# Patient Record
Sex: FEMALE | Race: WHITE | Employment: OTHER | URBAN - METROPOLITAN AREA
[De-identification: names, ages, dates, MRNs, and addresses within clinical notes are randomized per-mention and may not be internally consistent; named-entity substitution may affect disease eponyms.]

---

## 2017-01-04 ENCOUNTER — OFFICE VISIT (OUTPATIENT)
Dept: PAIN MANAGEMENT | Age: 72
End: 2017-01-04

## 2017-01-04 VITALS — WEIGHT: 129 LBS | BODY MASS INDEX: 25.19 KG/M2

## 2017-01-04 DIAGNOSIS — M54.50 CHRONIC BILATERAL LOW BACK PAIN WITHOUT SCIATICA: ICD-10-CM

## 2017-01-04 DIAGNOSIS — R53.82 CHRONIC FATIGUE: ICD-10-CM

## 2017-01-04 DIAGNOSIS — M70.62 TROCHANTERIC BURSITIS OF BOTH HIPS: ICD-10-CM

## 2017-01-04 DIAGNOSIS — M46.1 BILATERAL SACROILIITIS (HCC): Primary | ICD-10-CM

## 2017-01-04 DIAGNOSIS — G89.29 CHRONIC BILATERAL LOW BACK PAIN WITHOUT SCIATICA: ICD-10-CM

## 2017-01-04 DIAGNOSIS — M70.61 TROCHANTERIC BURSITIS OF BOTH HIPS: ICD-10-CM

## 2017-01-04 DIAGNOSIS — M54.6 CHRONIC MIDLINE THORACIC BACK PAIN: ICD-10-CM

## 2017-01-04 DIAGNOSIS — Z79.899 ENCOUNTER FOR LONG-TERM (CURRENT) USE OF HIGH-RISK MEDICATION: ICD-10-CM

## 2017-01-04 DIAGNOSIS — G62.89 OTHER POLYNEUROPATHY: ICD-10-CM

## 2017-01-04 DIAGNOSIS — M25.50 PAIN IN JOINT, MULTIPLE SITES: ICD-10-CM

## 2017-01-04 DIAGNOSIS — G89.29 CHRONIC MIDLINE THORACIC BACK PAIN: ICD-10-CM

## 2017-01-04 DIAGNOSIS — M47.817 LUMBOSACRAL SPONDYLOSIS WITHOUT MYELOPATHY: ICD-10-CM

## 2017-01-04 LAB
ALCOHOL UR POC: NORMAL
AMPHETAMINES UR POC: NEGATIVE
BARBITURATES UR POC: NEGATIVE
BENZODIAZEPINES UR POC: NORMAL
BUPRENORPHINE UR POC: NORMAL
CANNABINOIDS UR POC: NEGATIVE
CARISOPRODOL UR POC: NORMAL
COCAINE UR POC: NEGATIVE
FENTANYL UR POC: NORMAL
MDMA/ECSTASY UR POC: NEGATIVE
METHADONE UR POC: NEGATIVE
METHAMPHETAMINE UR POC: NEGATIVE
METHYLPHENIDATE UR POC: NEGATIVE
OPIATES UR POC: NEGATIVE
OXYCODONE UR POC: NORMAL
PHENCYCLIDINE UR POC: NEGATIVE
PROPOXYPHENE UR POC: NORMAL
TRAMADOL UR POC: NORMAL
TRICYCLICS UR POC: NEGATIVE

## 2017-01-04 RX ORDER — OXYCODONE HYDROCHLORIDE 30 MG/1
30 TABLET ORAL
Qty: 90 TAB | Refills: 0 | Status: SHIPPED | OUTPATIENT
Start: 2017-02-02 | End: 2017-01-04 | Stop reason: SDUPTHER

## 2017-01-04 RX ORDER — FENTANYL 50 UG/1
1 PATCH TRANSDERMAL
Qty: 15 PATCH | Refills: 0 | Status: SHIPPED | OUTPATIENT
Start: 2017-03-03 | End: 2017-04-03 | Stop reason: SDUPTHER

## 2017-01-04 RX ORDER — OXYCODONE HYDROCHLORIDE 30 MG/1
30 TABLET ORAL
Qty: 90 TAB | Refills: 0 | Status: SHIPPED | OUTPATIENT
Start: 2017-01-04 | End: 2017-01-04 | Stop reason: SDUPTHER

## 2017-01-04 RX ORDER — LORAZEPAM 1 MG/1
1 TABLET ORAL
Qty: 90 TAB | Refills: 0 | Status: SHIPPED | OUTPATIENT
Start: 2017-01-04 | End: 2017-04-03 | Stop reason: SDUPTHER

## 2017-01-04 RX ORDER — HYDROCHLOROTHIAZIDE 12.5 MG/1
10 TABLET ORAL DAILY
COMMUNITY
End: 2018-08-24

## 2017-01-04 RX ORDER — FENTANYL 50 UG/1
1 PATCH TRANSDERMAL
Qty: 15 PATCH | Refills: 0 | Status: SHIPPED | OUTPATIENT
Start: 2017-01-04 | End: 2017-01-04 | Stop reason: SDUPTHER

## 2017-01-04 RX ORDER — FENTANYL 50 UG/1
1 PATCH TRANSDERMAL
Qty: 15 PATCH | Refills: 0 | Status: SHIPPED | OUTPATIENT
Start: 2017-02-02 | End: 2017-01-04 | Stop reason: SDUPTHER

## 2017-01-04 RX ORDER — OXYCODONE HYDROCHLORIDE 30 MG/1
30 TABLET ORAL
Qty: 90 TAB | Refills: 0 | Status: SHIPPED | OUTPATIENT
Start: 2017-03-03 | End: 2017-04-02

## 2017-01-04 NOTE — MR AVS SNAPSHOT
Visit Information Date & Time Provider Department Dept. Phone Encounter #  
 1/4/2017 10:40 AM Susanna Bui [facility] [address] [facility] [phone] [id number] Follow-up Instructions Return in about 3 months (around 4/4/2017). Upcoming Health Maintenance Date Due Hepatitis C Screening 1945 DTaP/Tdap/Td series (1 - Tdap) 12/2/1967 BREAST CANCER SCRN MAMMOGRAM 12/2/1996 FOBT Q 1 YEAR AGE 50-75 12/2/1996 ZOSTER VACCINE AGE 60> 12/2/2006 GLAUCOMA SCREENING Q2Y 12/2/2011 OSTEOPOROSIS SCREENING (DEXA) 12/2/2011 Pneumococcal 65+ Low/Medium Risk (1 of 2 - PCV13) 12/2/2011 MEDICARE YEARLY EXAM 12/2/2011 INFLUENZA AGE 9 TO ADULT 8/1/2016 Allergies as of 1/4/2017  Review Complete On: 1/4/2017 By: Minerva Mas RN Severity Noted Reaction Type Reactions Codeine    Nausea Only Nsaids (Non-steroidal Anti-inflammatory Drug)    Diarrhea Abdominal pain  
 Sulfa (Sulfonamide Antibiotics)    Hives Current Immunizations  Never Reviewed No immunizations on file. Not reviewed this visit You Were Diagnosed With   
  
 Codes Comments Bilateral sacroiliitis ([facility]    -  Primary ICD-10-CM: M46.1 ICD-9-CM: 720.2 Trochanteric bursitis of both hips     ICD-10-CM: M70.61, M70.62 ICD-9-CM: 726.5 Other polyneuropathy ([facility].)     ICD-10-CM: G62.89 Chronic fatigue     ICD-10-CM: R53.82 
ICD-9-CM: 780.79 Chronic midline thoracic back pain     ICD-10-CM: M54.6, G89.29 ICD-9-CM: 724.1, 338.29 Encounter for long-term (current) use of high-risk medication     ICD-10-CM: Z79.899 ICD-9-CM: V58.69 Pain in joint, multiple sites     ICD-10-CM: M25.50 ICD-9-CM: 719.49 Lumbosacral spondylosis without myelopathy     ICD-10-CM: F40.952 ICD-9-CM: 370. 3 Chronic bilateral low back pain without sciatica     ICD-10-CM: M54.5, G89.29 ICD-9-CM: 724.2, 338.29 Vitals Weight(growth percentile) BMI OB Status Smoking Status 129 lb (58.5 kg) 25.19 kg/m2 Postmenopausal Never Smoker BMI and BSA Data Body Mass Index Body Surface Area  
 25.19 kg/m 2 1.57 m 2 Preferred Pharmacy Pharmacy Name Phone  [address] [phone] Your Updated Medication List  
  
   
This list is accurate as of: 1/4/17 12:04 PM.  Always use your most recent med list.  
  
  
  
  
 aspirin 81 mg tablet Take 81 mg by mouth. CRESTOR 20 mg tablet Generic drug:  rosuvastatin Take 20 mg by mouth daily. fentaNYL 50 mcg/hr PATCH Commonly known as:  DURAGESIC  
1 Patch by TransDERmal route every fourty-eight (48) hours. Max Daily Amount: 1 Patch. for chronic, severe, refractory pain. Mylan, Sandoz, or Mallinckrodt brands only Start taking on:  3/3/2017  
  
 gabapentin 600 mg tablet Commonly known as:  NEURONTIN Take 2 Tabs by mouth three (3) times daily. hydroCHLOROthiazide 12.5 mg tablet Commonly known as:  HYDRODIURIL Take 10 mg by mouth daily. lidocaine 5 % Commonly known as:  [employer] 1 patch by TransDERmal route every twenty-four (24) hours. lisinopril 20 mg tablet Commonly known as:  [place] [employer] Take 20 mg by mouth daily. LORazepam 1 mg tablet Commonly known as:  ATIVAN Take 1 Tab by mouth nightly. 90 day supply. modafinil 100 mg tablet Commonly known as:  PROVIGIL Take 1 Tab by mouth every morning for 90 days. NEXIUM PO Take  by mouth. oxyCODONE IR 30 mg immediate release tablet Commonly known as:  OXY-IR Take 1 Tab by mouth three (3) times daily as needed for Pain for up to 30 days. Max Daily Amount: 90 mg. Indications: NEUROPATHIC PAIN, PAIN Start taking on:  3/3/2017 tiZANidine 4 mg tablet Commonly known as:  Corlis Ax Take 1-2 Tabs by mouth three (3) times daily. As needed for muscle spasms Prescriptions Printed Refills  
 fentaNYL (DURAGESIC) 50 mcg/hr PATCH 0 Starting on: 3/3/2017 Si Patch by TransDERmal route every fourty-eight (48) hours. Max Daily Amount: 1 Patch. for chronic, severe, refractory pain. Mylan, Sandoz, or Mallinckrodt brands only Class: Print Route: TransDERmal  
 oxyCODONE IR (OXY-IR) 30 mg immediate release tablet 0 Starting on: 3/3/2017 Sig: Take 1 Tab by mouth three (3) times daily as needed for Pain for up to 30 days. Max Daily Amount: 90 mg. Indications: NEUROPATHIC PAIN, PAIN Class: Print Route: Oral  
 LORazepam (ATIVAN) 1 mg tablet 0 Sig: Take 1 Tab by mouth nightly. 90 day supply. Class: Print Route: Oral  
  
Follow-up Instructions Return in about 3 months (around 2017). Referral Information Referral ID Referred By Referred To  
  
 [id number] Emy Quintana Not Available Visits Status Start Date End Date 12 New Request 17 If your referral has a status of pending review or denied, additional information will be sent to support the outcome of this decision. Patient Instructions Plan: 
Continue same medications as prescribed for chronic pain Follow up in 3 months or sooner if needed Regular exercise and attention to emotional health and diet remain the most effective ways to treat chronic pain of all kinds You may contact me with questions or concerns through [address] [facility]! Marianna Gardner introduces SocialDial patient portal. Now you can access parts of your medical record, email your doctor's office, and request medication refills online. 1. In your internet browser, go to [email or web address] 2. Click on the First Time User? Click Here link in the Sign In box. You will see the New Member Sign Up page. 3. Enter your SocialDial Access Code exactly as it appears below.  You will not need to use this code after youve completed the sign-up process. If you do not sign up before the expiration date, you must request a new code. · Viadeo Access Code: [email or web address] Expires: 4/4/2017 11:48 AM 
 
4. Enter the last four digits of your Social Security Number (xxxx) and Date of Birth (mm/dd/yyyy) as indicated and click Submit. You will be taken to the next sign-up page. 5. Create a Viadeo ID. This will be your Viadeo login ID and cannot be changed, so think of one that is secure and easy to remember. 6. Create a Viadeo password. You can change your password at any time. 7. Enter your Password Reset Question and Answer. This can be used at a later time if you forget your password. 8. Enter your e-mail address. You will receive e-mail notification when new information is available in [address] 9. Click Sign Up. You can now view and download portions of your medical record. 10. Click the Download Summary menu link to download a portable copy of your medical information. If you have questions, please visit the Frequently Asked Questions section of the Viadeo website. Remember, Viadeo is NOT to be used for urgent needs. For medical emergencies, dial 911. Now available from your iPhone and Android! Please provide this summary of care documentation to your next provider. Your primary care clinician is listed as [facility] [address] If you have any questions after today's visit, please call [phone].

## 2017-01-04 NOTE — PROGRESS NOTES
HISTORY OF PRESENT ILLNESS  Cari Garcia is a 80 y.o. female. Patient presents for follow up of chronic pain due to lumbar DDD and osteoarthritis. She reports that her sacroiliac and lumbar pain has been worse over the past several weeks, particularly on the left. This is worse with rising from a seated position or rolling over in bed. The pain is described as sharp, aching, stabbing, and tender. She denies saddle anesthesia or bowel/bladder dysfunction. She also endorses worse pain at the outer aspect of the hips, which is attributable to chronic trochanteric bursitis. She would like to be referred to Dr. Mateo Mcnulty for SIJ/TB injections, as his office is closer to her home and procedures are performed in-office rather than in the hospital, which reduces her out of pocket costs. Medications continue to work well for pain control overall. Cari Garcia is tolerating medications well, with no untoward side effects noted. She is able to stay more active with less discomfort with these current doses. The patient reports an average of 70% relief with this regimen. Most recent UDS and  were consistent with prescribed medications. Pill counts are appropriate. She is informed of side effects, risks, and benefits of this regimen, and emphasizes that she derives a significant improvement in functionality and quality of life, and notes that non-opioid medications and therapies in the past have not offered significant benefit. She denies new or worsening insomnia or constipation issues. She denies any falls, injuries, or hospitalizations since the last visit. HPI--see above    ROS  Constitutional: Positive for malaise/fatigue. HENT: Positive for neck pain. Gastrointestinal: Positive for constipation. Musculoskeletal: Positive for back pain and joint pain. Psychiatric/Behavioral: The patient does not have insomnia. Physical Exam  Constitutional: She is oriented to person, place, and time.  She appears well-developed and well-nourished. No distress. Musculoskeletal:        Right hip: She exhibits tenderness. Left hip: She exhibits tenderness. Lumbar back: She exhibits decreased range of motion, tenderness, bony tenderness, pain and spasm. Back:              Legs:            Areas of tenderness noted with red arrows  Marked spasms of cervical and lumbar paraspinous musculature noted  Well-healed incision of the anterior right knee with extension to 160 degrees, flexion to 120 degrees  Trochanteric bursae tender to palpation right side     Neurological: She is alert and oriented to person, place, and time. No cranial nerve deficit. She exhibits normal muscle tone. Coordination normal.       Neg straight leg raise   Psychiatric: She has a normal mood and affect. Her behavior is normal. Judgment and thought content normal.   ASSESSMENT and PLAN    ICD-10-CM ICD-9-CM    1. Bilateral sacroiliitis (HCC) M46.1 720.2 REFERRAL TO PAIN MANAGEMENT      DRUG SCREEN      AMB POC DRUG SCREEN ()   2. Trochanteric bursitis of both hips M70.61 726.5 REFERRAL TO PAIN MANAGEMENT    M70.62  DRUG SCREEN      AMB POC DRUG SCREEN ()   3. Other polyneuropathy ([facility] G62.89  DRUG SCREEN      AMB POC DRUG SCREEN ()   4. Chronic fatigue R53.82 780.79 DRUG SCREEN      AMB POC DRUG SCREEN ()   5. Chronic midline thoracic back pain M54.6 724.1 DRUG SCREEN    G89.29 338.29 AMB POC DRUG SCREEN ()   6. Encounter for long-term (current) use of high-risk medication Z79.899 V58.69 DRUG SCREEN      AMB POC DRUG SCREEN ()   7. Pain in joint, multiple sites M25.50 719.49 DRUG SCREEN      AMB POC DRUG SCREEN ()   8. Lumbosacral spondylosis without myelopathy M47.817 721.3 DRUG SCREEN      AMB POC DRUG SCREEN ()   9.  Chronic bilateral low back pain without sciatica M54.5 724.2 DRUG SCREEN    G89.29 338.29 AMB POC DRUG SCREEN ()      Plan:  Continue same medications as prescribed for chronic pain  Follow up in 3 months or sooner if needed  Regular exercise and attention to emotional health and diet remain the most effective ways to treat chronic pain of all kinds  You may contact me with questions or concerns through [address]

## 2017-01-04 NOTE — PROGRESS NOTES
Nursing Notes    Patient presents to the office today in follow-up. Patient rates her pain at 6/10 on the numerical pain scale. Reviewed medications with counts as follows:    Rx Date filled Qty Dispensed Pill Count Last Dose Short   Fentanyl 50 11/25/16 15 1+1 on Placed 12/2/16 no   lary 30 10/14/16 90 65 1/4/16 no         Comments: pt still has script at home for the lary to fill in Nov. She missed appt in Dec. But has made the fentanyl last.  verified fills    POC UDS was performed in office today    Any new labs or imaging since last appointment? NO    Have you been to an emergency room (ER) or urgent care clinic since your last visit? NO            Have you been hospitalized since your last visit? NO     If yes, where, when, and reason for visit? Have you seen or consulted any other health care providers outside of the [address] [place] [address]  since your last visit? NO     If yes, where, when, and reason for visit? Ms. Juan J Tipton has a reminder for a \"due or due soon\" health maintenance. I have asked that she contact her primary care provider for follow-up on this health maintenance.

## 2017-01-04 NOTE — PATIENT INSTRUCTIONS
Plan:  Continue same medications as prescribed for chronic pain  Follow up in 3 months or sooner if needed  Regular exercise and attention to emotional health and diet remain the most effective ways to treat chronic pain of all kinds  You may contact me with questions or concerns through [address]

## 2017-01-10 ENCOUNTER — TELEPHONE (OUTPATIENT)
Dept: PAIN MANAGEMENT | Age: 72
End: 2017-01-10

## 2017-01-10 NOTE — TELEPHONE ENCOUNTER
The pt called the office to check on the status of her referral for Dr. Mateo Mcnulty. A chart review was done and it was noted that it has not been faxed over yet. The referral and other necessary pt information were faxed over [address]. A fax confirmation was received and they will contact the pt. I called and spoke to the pt. She was informed that the referral was faxed. She verbalized understanding and has no other requests at this time.

## 2017-01-11 RX ORDER — DIAZEPAM 5 MG/1
TABLET ORAL
Qty: 2 TAB | Refills: 0 | OUTPATIENT
Start: 2017-01-11 | End: 2017-06-29

## 2017-04-03 ENCOUNTER — OFFICE VISIT (OUTPATIENT)
Dept: PAIN MANAGEMENT | Age: 72
End: 2017-04-03

## 2017-04-03 VITALS
WEIGHT: 129 LBS | HEART RATE: 77 BPM | BODY MASS INDEX: 25.32 KG/M2 | DIASTOLIC BLOOD PRESSURE: 74 MMHG | SYSTOLIC BLOOD PRESSURE: 123 MMHG | HEIGHT: 60 IN

## 2017-04-03 DIAGNOSIS — M79.661 PAIN AND SWELLING OF RIGHT LOWER LEG: ICD-10-CM

## 2017-04-03 DIAGNOSIS — M51.36 DDD (DEGENERATIVE DISC DISEASE), LUMBAR: ICD-10-CM

## 2017-04-03 DIAGNOSIS — M54.50 CHRONIC BILATERAL LOW BACK PAIN WITHOUT SCIATICA: ICD-10-CM

## 2017-04-03 DIAGNOSIS — M79.89 PAIN AND SWELLING OF RIGHT LOWER LEG: ICD-10-CM

## 2017-04-03 DIAGNOSIS — M47.817 LUMBOSACRAL SPONDYLOSIS WITHOUT MYELOPATHY: ICD-10-CM

## 2017-04-03 DIAGNOSIS — Z86.718 HISTORY OF DVT (DEEP VEIN THROMBOSIS): ICD-10-CM

## 2017-04-03 DIAGNOSIS — M53.3 SACROILIAC JOINT PAIN: ICD-10-CM

## 2017-04-03 DIAGNOSIS — M47.816 SPONDYLOSIS OF LUMBAR REGION WITHOUT MYELOPATHY OR RADICULOPATHY: ICD-10-CM

## 2017-04-03 DIAGNOSIS — M17.0 PRIMARY OSTEOARTHRITIS OF BOTH KNEES: ICD-10-CM

## 2017-04-03 DIAGNOSIS — M79.89 PAIN AND SWELLING OF LOWER LEG, LEFT: Primary | ICD-10-CM

## 2017-04-03 DIAGNOSIS — M46.1 SACROILIITIS (HCC): ICD-10-CM

## 2017-04-03 DIAGNOSIS — M25.50 PAIN IN JOINT, MULTIPLE SITES: ICD-10-CM

## 2017-04-03 DIAGNOSIS — G89.29 CHRONIC BILATERAL LOW BACK PAIN WITHOUT SCIATICA: ICD-10-CM

## 2017-04-03 DIAGNOSIS — Z96.659 S/P KNEE REPLACEMENT: ICD-10-CM

## 2017-04-03 DIAGNOSIS — M79.662 PAIN AND SWELLING OF LOWER LEG, LEFT: Primary | ICD-10-CM

## 2017-04-03 DIAGNOSIS — M18.10 DEGENERATIVE ARTHRITIS OF THUMB, UNSPECIFIED LATERALITY: ICD-10-CM

## 2017-04-03 RX ORDER — LORAZEPAM 1 MG/1
1 TABLET ORAL
Qty: 90 TAB | Refills: 0 | Status: SHIPPED | OUTPATIENT
Start: 2017-04-03 | End: 2017-06-29 | Stop reason: SDUPTHER

## 2017-04-03 RX ORDER — FENTANYL 50 UG/1
1 PATCH TRANSDERMAL
Qty: 15 PATCH | Refills: 0 | Status: SHIPPED | OUTPATIENT
Start: 2017-06-10 | End: 2017-06-29 | Stop reason: SDUPTHER

## 2017-04-03 RX ORDER — NALOXONE HYDROCHLORIDE 4 MG/.1ML
4 SPRAY NASAL
Qty: 1 PACKAGE | Refills: 0 | Status: SHIPPED | OUTPATIENT
Start: 2017-04-03

## 2017-04-03 RX ORDER — FENTANYL 50 UG/1
1 PATCH TRANSDERMAL
Qty: 15 PATCH | Refills: 0 | Status: SHIPPED | OUTPATIENT
Start: 2017-05-12 | End: 2017-04-03 | Stop reason: SDUPTHER

## 2017-04-03 RX ORDER — MODAFINIL 100 MG/1
100 TABLET ORAL
Qty: 90 TAB | Refills: 1 | Status: SHIPPED | OUTPATIENT
Start: 2017-04-03 | End: 2017-06-29 | Stop reason: SDUPTHER

## 2017-04-03 RX ORDER — FENTANYL 50 UG/1
1 PATCH TRANSDERMAL
Qty: 15 PATCH | Refills: 0 | Status: SHIPPED | OUTPATIENT
Start: 2017-04-13 | End: 2017-04-03 | Stop reason: SDUPTHER

## 2017-04-03 NOTE — PROGRESS NOTES
HISTORY OF PRESENT ILLNESS  Dolores Christianson is a 80 y.o. female. Patient presents for follow up of chronic pain due to lumbar DDD, chronic sacroiliac dysfunction, and osteoarthritis. She has multiple issues to discuss. She complains of persistent, progressively worsening pain in the left lower lumbar region, radiating into the left buttock and posterior thigh. It dose not extend past the thigh. She denies saddle anesthesia or bowel/bladder dysfunction. Pain worsens considerShe referred to this pain as \"my sciatica\" but we discussed that these symptoms are consistent with sacroiliac inflammation/dysfunction. She was disappointed that her last SIJ injections with Dr. Bernie Childs, noting that they were not effective as they have been in the past. We discussed treatments at length, and she is interested in proceeding with RFA for sacroiliitis. She is willing to stay here for treatment rather than refer out to Dr. Bernie Childs. She does note that lumbar RFA was extremely helpful in 2014 for low back and right leg pain. Procedure discussed in detail with the patient. She also reports bilateral inner thigh pain from the groin to the inner knees that is intermittent and is described as deep and aching and tender to touch. She is worried that she may have another DVT (hx of bilateral DVTs twenty years ago when she was on HRT). We will arrange an updated MRI of the lumbar spine, and encouraged her to see her PCP about groin/inner thigh pain. She requests that we arrange dopplers when she goes in for imaging of the lumbar spine. She also complains of pain and tenderness along the upper-lumbar region on the left side, with spasms. She sees a massage therapist regularly. We discussed the possibility of scheduling trigger point injections. She will consider this. Medications continue to work modestly well for pain control overall. Dolores Christianson is tolerating medications well, with no untoward side effects noted.  She is able to stay more active with less discomfort with these current doses. The patient reports an average of 70% relief with this regimen. Most recent UDS and  were consistent with prescribed medications. Pill counts are appropriate. She is informed of side effects, risks, and benefits of this regimen, and emphasizes that she derives a significant improvement in functionality and quality of life, and notes that non-opioid medications and therapies in the past have not offered significant benefit. We will prescribe Naloxone 4 mg nasal spray to use in case of accidental overdose. The patient is aware not to use Naloxone for any reasons other than opioid toxicity, as its use may precipitate withdrawals. The patient was instructed on directions and indications for use, and has also been advised to inform family members on how to use naloxone if overdose occurs. The patient expresses understanding. She denies new or worsening insomnia or constipation issues. She denies any falls, injuries, or hospitalizations since the last visit. A total of 50 minutes was spent with the patient of which more than 50% of the time was spent counseling the patient. HPI--see above    ROS  Constitutional: Positive for malaise/fatigue. HENT: Positive for neck pain. Gastrointestinal: Positive for constipation. Musculoskeletal: Positive for back pain and joint pain. Psychiatric/Behavioral: The patient does not have insomnia. Physical Exam  Constitutional: She is oriented to person, place, and time. She appears well-developed and well-nourished. No distress. Musculoskeletal:        Right hip: She exhibits tenderness. Left hip: She exhibits tenderness. Lumbar back: She exhibits decreased range of motion, tenderness, bony tenderness, pain and spasm.         Back:              Legs:            Areas of tenderness noted with red arrows  Marked spasms of cervical and lumbar paraspinous musculature noted  Well-healed incision of the anterior right knee with extension to 160 degrees, flexion to 120 degrees  Trochanteric bursae tender to palpation right side  Brisk sacroiliac tenderness noted on left side  Tenderness of inner thighs bilaterally with varicosities evident. No redness or swelling noted. No lymphadenopathy     Neurological: She is alert and oriented to person, place, and time. No cranial nerve deficit. She exhibits normal muscle tone. Coordination normal.       Neg straight leg raise   Psychiatric: She has a normal mood and affect. Her behavior is normal. Judgment and thought content normal.   ASSESSMENT and PLAN    ICD-10-CM ICD-9-CM    1. Pain and swelling of lower leg, left M79.662 729.5 DUPLEX LOWER EXT VENOUS LEFT    M79.89  DUPLEX LOWER EXT VENOUS RIGHT   2. Sacroiliitis (HCC) M46.1 720.2 MRI LUMB SPINE WO CONT   3. Spondylosis of lumbar region without myelopathy or radiculopathy M47.816 721.3 MRI LUMB SPINE WO CONT   4. DDD (degenerative disc disease), lumbar M51.36 722. 52 MRI LUMB SPINE WO CONT   5. Pain and swelling of right lower leg M79.661 729.5 DUPLEX LOWER EXT VENOUS LEFT    M79.89 729.81 DUPLEX LOWER EXT VENOUS RIGHT   6. History of DVT (deep vein thrombosis) Z86.718 V12.51 DUPLEX LOWER EXT VENOUS LEFT      DUPLEX LOWER EXT VENOUS RIGHT   7. Lumbosacral spondylosis without myelopathy M47.817 721.3    8. Chronic bilateral low back pain without sciatica M54.5 724.2     G89.29 338.29    9. Pain in joint, multiple sites M25.50 719.49    10. Myalgia and myositis, unspecified [id number] 729.1    11. Primary osteoarthritis of both knees M17.0 715.16    12. S/P knee replacement Z96.659 V43.65    13. Degenerative arthritis of thumb, unspecified laterality M19.049 715.34    14. Sacroiliac joint pain M53.3 724.6       Plan:  Continue same medications as prescribed for chronic pain  MRI of the lumbar spine  Medial branch of the lumbar spine at L1-S3 and lateral branch block injection L5 (left side).  If effective, then proceed with radiofrequency ablation  Dopplers of the legs to rule out DVT   We will prescribe Naloxone 4 mg nasal spray to use in case of accidental overdose.    Consider weaning off of Ativan and trying a non-narcotic sleep aid to attenuate the risk of falls, fractures and sleep-disordered breathing associated with their use  Follow up in 3 months or sooner if needed  Regular exercise and attention to emotional health and diet remain the most effective ways to treat chronic pain of all kinds  You may contact me with questions or concerns through [employer]

## 2017-04-03 NOTE — PATIENT INSTRUCTIONS
Plan:  Continue same medications as prescribed for chronic pain  MRI of the lumbar spine  Medial branch of the lumbar spine at L1-S3 and lateral branch block injection L5 (left side). If effective, then proceed with radiofrequency ablation  Dopplers of the legs to rule out DVT   We will prescribe Naloxone 4 mg nasal spray to use in case of accidental overdose.    Consider weaning off of Ativan and trying a non-narcotic sleep aid to attenuate the risk of falls, fractures and sleep-disordered breathing associated with their use  Follow up in 3 months or sooner if needed  Regular exercise and attention to emotional health and diet remain the most effective ways to treat chronic pain of all kinds  You may contact me with questions or concerns through [employer]

## 2017-04-03 NOTE — PROGRESS NOTES
Nursing Notes    Patient presents to the office today in follow-up. Patient rates her pain at 4/10 on the numerical pain scale. Reviewed medications with counts as follows:    Rx Date filled Qty Dispensed Pill Count Last Dose Short   Fentanyl 50 mcg/hr  03/15/17 15 10, +1 on  Current patch on right upper back no   Oxycodone 30 mg - pt has 2 unfilled prescriptions. One is  02/10/17 90 74 2 days ago no   Ativan 1 mg 17 90 22 Last night no                    POC UDS was not performed in office today    Any new labs or imaging since last appointment? YES. Pt states that she had some labs done with her pcp    Have you been to an emergency room (ER) or urgent care clinic since your last visit? NO            Have you been hospitalized since your last visit? NO     If yes, where, when, and reason for visit? Have you seen or consulted any other health care providers outside of the [address] [place] [address]  since your last visit? YES     If yes, where, when, and reason for visit? pcp    HM deferred to pcp.

## 2017-04-03 NOTE — MR AVS SNAPSHOT
Visit Information Date & Time Provider Department Dept. Phone Encounter #  
 4/3/2017 11:00 AM Kelby Harvey [address] [place] [address] for Pain Management [phone] [id number] Follow-up Instructions Return in about 3 months (around 7/3/2017). Follow-up and Disposition History Upcoming Health Maintenance Date Due Hepatitis C Screening 1945 DTaP/Tdap/Td series (1 - Tdap) 12/2/1967 BREAST CANCER SCRN MAMMOGRAM 12/2/1996 FOBT Q 1 YEAR AGE 50-75 12/2/1996 ZOSTER VACCINE AGE 60> 12/2/2006 GLAUCOMA SCREENING Q2Y 12/2/2011 OSTEOPOROSIS SCREENING (DEXA) 12/2/2011 Pneumococcal 65+ Low/Medium Risk (1 of 2 - PCV13) 12/2/2011 MEDICARE YEARLY EXAM 12/2/2011 INFLUENZA AGE 9 TO ADULT 8/1/2016 Allergies as of 4/3/2017  Review Complete On: 4/3/2017 By: Jaison Cobian LPN Severity Noted Reaction Type Reactions Codeine    Nausea Only Nsaids (Non-steroidal Anti-inflammatory Drug)    Diarrhea Abdominal pain  
 Sulfa (Sulfonamide Antibiotics)    Hives Current Immunizations  Never Reviewed No immunizations on file. Not reviewed this visit You Were Diagnosed With   
  
 Codes Comments Pain and swelling of lower leg, left    -  Primary ICD-10-CM: M79.662, M79.89 ICD-9-CM: 729.5 [facility]ili[facility] ([facility]     ICD-10-CM: M46.1 ICD-9-CM: 720.2 Spondylosis of lumbar region without myelopathy or radiculopathy     ICD-10-CM: M47.816 ICD-9-CM: 721.3 DDD (degenerative disc disease), lumbar     ICD-10-CM: M51.36 
ICD-9-CM: 722.52 Pain and swelling of right lower leg     ICD-10-CM: M79.661, M79.89 ICD-9-CM: 729.5, 729.81 History of DVT (deep vein thrombosis)     ICD-10-CM: W36.648 ICD-9-CM: V12.51 Lumbosacral spondylosis without myelopathy     ICD-10-CM: B80.887 ICD-9-CM: 797. 3 Chronic bilateral low back pain without sciatica     ICD-10-CM: M54.5, G89.29 ICD-9-CM: 724.2, 338.29   
 Pain in joint, multiple sites     ICD-10-CM: M25.50 ICD-9-CM: 719.49 Myalgia and myositis, unspecified     ICD-10-CM: Merwyn Rice ICD-9-CM: 729.1 Primary osteoarthritis of both knees     ICD-10-CM: M17.0 ICD-9-CM: 715.16 S/P knee replacement     ICD-10-CM: S10.157 ICD-9-CM: V43.65 Degenerative arthritis of thumb, unspecified laterality     ICD-10-CM: M19.049 ICD-9-CM: 715.34 Sacroiliac joint pain     ICD-10-CM: M53.3 ICD-9-CM: 724.6 Vitals BP Pulse Height(growth percentile) Weight(growth percentile) BMI OB Status 123/74 77 5' (1.524 m) 129 lb (58.5 kg) 25.19 kg/m2 Postmenopausal  
 Smoking Status Never Smoker Vitals History BMI and BSA Data Body Mass Index Body Surface Area  
 25.19 kg/m 2 1.57 m 2 Preferred Pharmacy Pharmacy Name Phone [facility] [address], [address] [place] [address] [phone] Your Updated Medication List  
  
   
This list is accurate as of: 4/3/17 12:16 PM.  Always use your most recent med list.  
  
  
  
  
 aspirin 81 mg tablet Take 81 mg by mouth. CRESTOR 20 mg tablet Generic drug:  rosuvastatin Take 20 mg by mouth daily. diazePAM 5 mg tablet Commonly known as:  VALIUM Pt to take one tablet 30 minutes prior to procedure. May take the remaining tablet at facility if needed for anxiety. fentaNYL 50 mcg/hr PATCH Commonly known as:  DURAGESIC  
1 Patch by TransDERmal route every fourty-eight (48) hours. Max Daily Amount: 1 Patch. for chronic, severe, refractory pain. Mylan, Sandoz, or Mallinckrodt brands only Start taking on:  6/10/2017  
  
 gabapentin 600 mg tablet Commonly known as:  NEURONTIN Take 2 Tabs by mouth three (3) times daily. hydroCHLOROthiazide 12.5 mg tablet Commonly known as:  HYDRODIURIL Take 10 mg by mouth daily. lidocaine 5 % Commonly known as:  Calvin Haines  
 1 patch by TransDERmal route every twenty-four (24) hours. lisinopril 20 mg tablet Commonly known as:  Shant Gina Take 20 mg by mouth daily. LORazepam 1 mg tablet Commonly known as:  ATIVAN Take 1 Tab by mouth nightly. 90 day supply. modafinil 100 mg tablet Commonly known as:  PROVIGIL Take 1 Tab by mouth every morning for 90 days. naloxone 4 mg/actuation Spry 4 mg by Nasal route once as needed (for opioid overdose) for up to 1 dose. Indications: OPIOID TOXICITY  
  
 NEXIUM PO Take  by mouth. tiZANidine 4 mg tablet Commonly known as:  Gerarda Christy Take 1-2 Tabs by mouth three (3) times daily. As needed for muscle spasms Prescriptions Printed Refills  
 fentaNYL (DURAGESIC) 50 mcg/hr PATCH 0 Starting on: 6/10/2017 Si Patch by TransDERmal route every fourty-eight (48) hours. Max Daily Amount: 1 Patch. for chronic, severe, refractory pain. Mylan, Sandoz, or Mallinckrodt brands only Class: Print Route: TransDERmal  
 LORazepam (ATIVAN) 1 mg tablet 0 Sig: Take 1 Tab by mouth nightly. 90 day supply. Class: Print Route: Oral  
 modafinil (PROVIGIL) 100 mg tablet 1 Sig: Take 1 Tab by mouth every morning for 90 days. Class: Print Route: Oral  
  
Prescriptions Sent to Pharmacy Refills  
 naloxone 4 mg/actuation spry 0 Si mg by Nasal route once as needed (for opioid overdose) for up to 1 dose. Indications: OPIOID TOXICITY Class: Normal  
 Pharmacy: [facility] [address] [place] [address] #: [phone] Route: Nasal  
  
Follow-up Instructions Return in about 3 months (around 7/3/2017). To-Do List   
 2017 Imaging:  DUPLEX LOWER EXT VENOUS LEFT   
  
 2017 Imaging:  DUPLEX LOWER EXT VENOUS RIGHT   
  
 2017 Imaging:  MRI LUMB SPINE WO CONT Patient Instructions Plan: Continue same medications as prescribed for chronic pain MRI of the lumbar spine Medial branch of the lumbar spine at L1-S3 and lateral branch block injection L5 (left side). If effective, then proceed with radiofrequency ablation Dopplers of the legs to rule out DVT We will prescribe Naloxone 4 mg nasal spray to use in case of accidental overdose. Consider weaning off of Ativan and trying a non-narcotic sleep aid to attenuate the risk of falls, fractures and sleep-disordered breathing associated with their use Follow up in 3 months or sooner if needed Regular exercise and attention to emotional health and diet remain the most effective ways to treat chronic pain of all kinds You may contact me with questions or concerns through [address] Introducing [facility]! Eugenia Núñez introduces [employer] patient portal. Now you can access parts of your medical record, email your doctor's office, and request medication refills online. 1. In your internet browser, go to [email or web address] 2. Click on the First Time User? Click Here link in the Sign In box. You will see the New Member Sign Up page. 3. Enter your [employer] Access Code exactly as it appears below. You will not need to use this code after youve completed the sign-up process. If you do not sign up before the expiration date, you must request a new code. · [employer] Access Code: [email or web address] Expires: 4/4/2017 12:48 PM 
 
4. Enter the last four digits of your Social Security Number (xxxx) and Date of Birth (mm/dd/yyyy) as indicated and click Submit. You will be taken to the next sign-up page. 5. Create a InVivio[employer] ID. This will be your InVivio[employer] login ID and cannot be changed, so think of one that is secure and easy to remember. 6. Create a InV[employer] password. You can change your password at any time. 7. Enter your Password Reset Question and Answer. This can be used at a later time if you forget your password. 8. Enter your e-mail address. You will receive e-mail notification when new information is available in [address] 9. Click Sign Up. You can now view and download portions of your medical record. 10. Click the Download Summary menu link to download a portable copy of your medical information. If you have questions, please visit the Frequently Asked Questions section of the [employer]olidar website. Remember, [employer]ar is NOT to be used for urgent needs. For medical emergencies, dial 911. Now available from your iPhone and Android! Please provide this summary of care documentation to your next provider. Your primary care clinician is [facility] [address] If you have any questions after today's visit, please call [phone].

## 2017-04-05 ENCOUNTER — APPOINTMENT (OUTPATIENT)
Dept: GENERAL RADIOLOGY | Age: 72
End: 2017-04-05
Attending: PHYSICAL MEDICINE & REHABILITATION
Payer: MEDICARE

## 2017-04-05 ENCOUNTER — HOSPITAL ENCOUNTER (OUTPATIENT)
Age: 72
Setting detail: OUTPATIENT SURGERY
Discharge: HOME | End: 2017-04-05
Attending: PHYSICAL MEDICINE & REHABILITATION | Admitting: PHYSICAL MEDICINE & REHABILITATION
Payer: MEDICARE

## 2017-04-05 VITALS
HEART RATE: 60 BPM | HEIGHT: 60 IN | TEMPERATURE: 98.3 F | RESPIRATION RATE: 16 BRPM | SYSTOLIC BLOOD PRESSURE: 153 MMHG | WEIGHT: 129 LBS | DIASTOLIC BLOOD PRESSURE: 98 MMHG | OXYGEN SATURATION: 100 % | BODY MASS INDEX: 25.32 KG/M2

## 2017-04-05 PROCEDURE — 77030003665 HC NDL SPN BBMI -A: Performed by: PHYSICAL MEDICINE & REHABILITATION

## 2017-04-05 PROCEDURE — 74011250637 HC RX REV CODE- 250/637: Performed by: PHYSICAL MEDICINE & REHABILITATION

## 2017-04-05 PROCEDURE — 77030003672 HC NDL SPN HALY -A: Performed by: PHYSICAL MEDICINE & REHABILITATION

## 2017-04-05 PROCEDURE — 74011250636 HC RX REV CODE- 250/636

## 2017-04-05 PROCEDURE — 76010000009 HC PAIN MGT 0 TO 30 MIN PROC: Performed by: PHYSICAL MEDICINE & REHABILITATION

## 2017-04-05 PROCEDURE — 74011000250 HC RX REV CODE- 250

## 2017-04-05 RX ORDER — ROPIVACAINE HYDROCHLORIDE 2 MG/ML
INJECTION, SOLUTION EPIDURAL; INFILTRATION; PERINEURAL AS NEEDED
Status: DISCONTINUED | OUTPATIENT
Start: 2017-04-05 | End: 2017-04-05 | Stop reason: HOSPADM

## 2017-04-05 RX ORDER — DIAZEPAM 5 MG/1
5-20 TABLET ORAL ONCE
Status: COMPLETED | OUTPATIENT
Start: 2017-04-05 | End: 2017-04-05

## 2017-04-05 RX ORDER — SODIUM CHLORIDE 0.9 % (FLUSH) 0.9 %
5-10 SYRINGE (ML) INJECTION AS NEEDED
Status: DISCONTINUED | OUTPATIENT
Start: 2017-04-05 | End: 2017-04-05 | Stop reason: HOSPADM

## 2017-04-05 RX ADMIN — DIAZEPAM 5 MG: 5 TABLET ORAL at 08:15

## 2017-04-05 NOTE — PROCEDURES
[facility] [address] FOR PAIN MANAGEMENT    LUMBAR DORSAL RAMUS AND LATERAL BRANCH BLOCKS   PROCEDURE REPORT      PATIENT:  Vida Osullivan  YOB: 1945  DATE OF SERVICE:  4/5/2017  [facility]    PRE-PROCEDURE DIAGNOSIS:  See Above    POST-PROCEDURE DIAGNOSIS:  See Above                PROCEDURE:  1. Bilateral diagnostic lumbar facet injection (via the L5 medial branch nerve block) (77626, 50  )  2. Bilateral S1, S2 and S3 dorsal rami lateral branch nerve blocks (64450 x3)  3. Fluoroscopic needle guidance, spinal (for the lateral branch blocks) (49731)      ANESTHESIA:  Local with oral sedation. See Medication Administration Record for specific medications and dosage. COMPLICATIONS: None. PHYSICIAN:  Liudmila Grande MD    PRE-PROCEDURE NOTE:  Pre-procedural assessment of the patient was performed including a limited history and physical examination. The details of the procedure were discussed with the patient, including the risks, benefits and alternative options and an informed consent was obtained. The patients NPO status, if necessary for the specific procedure and/or administration of moderate intravenous sedation, if utilized, and availability of a responsible adult to escort the patient following the procedure were confirmed. PROCEDURE NOTE:  The patient was brought to the procedure suite and positioned on the fluoroscopy table in the prone position. Physiologic monitors were applied and supplemental oxygen was administered via nasal cannula. The skin was prepped in the standard surgical fashion and sterile drapes were applied over the procedure site. Please refer to the Flowsheet for documentation of the patients vital signs and the Medication Administration Report for any oral sedation administered prior to or during the procedure. 1% Lidocaine was utilized for local anesthesia.  Under AP fluoroscopic guidance a 22-gauge, 3-1/2 inch short bevel spinal needle was advanced to the superior margin of the sacral ala to block the L5 medial branch nerve(s). Following this, a 22-gauge, 3-1/2 inch short bevel spinal needle was advanced to the lateral aspect of the S1 neural foramen and repeated in the same fashion at the S2 and S3 neural foramina. After all needles were placed, 1 mL of 0.2% ropivacaine was injected at each location after the negative aspiration of blood, air or CSF. The needles were removed and the stilets were replaced. The procedure was performed on the contralateral side in the same fashion and at the same levels using the same volume of local anesthetic following negative aspiration of blood, air or CSF. The needles were removed intact. The area was thoroughly cleaned and sterile bandages applied as necessary. The patient tolerated the procedure well and vital signs remained stable throughout the procedure. The patient was assessed immediately following the procedure and was noted to have greater than 50% reduction in pain (a reduction from 7/10 to 3/10 in severity of lumbar pain). Based on these results, this procedure will be repeated to assess if the patient is an appropriate candidate for radiofrequency ablation of the above-mentioned medial branch nerves. POST-PROCEDURE COURSE:   The patient was escorted from the procedure suite in satisfactory condition and recovered per facility protocol based on the type of procedure performed and/or the sedation utilized. The patient did not experience any adverse events and remained hemodynamically stable during the post-procedure period. DISCHARGE NOTE:  Upon discharge, the patient was able to tolerate fluids and was in no acute distress. The patient was oriented to person, place and time and vital signs were stable.  Appropriate post-procedure instructions were provided and explained to the patient in detail and all questions were answered.     Petr White MD 4/5/2017 9:28 AM

## 2017-04-05 NOTE — INTERVAL H&P NOTE
H&P Update:  Gallito Ramos was seen and examined. History and physical has been reviewed. The patient has been examined.  There have been no significant clinical changes since the completion of the originally dated History and Physical.    Signed By: Maryrose Baumgarten, MD     April 5, 2017 8:14 AM

## 2017-04-05 NOTE — DISCHARGE INSTRUCTIONS
[address] [place] [address] for Pain Management      Post Procedures Instructions    *Resume Diet and Activity as tolerated. Rest for the remainder of the day. *You may fell worse before you feel better as the numbing medications wear off before the steroids take effect if used for your procedures. *Do not use affected extremity until numbness or loss of sensation has completely resolved without assistance. *DO NOT DRIVE, operate machinery/heavey equipment for 24 hours. *DO NOT DRINK ALCOHOL for 24 hours as it may interact with the sedation if you received it and also thins your blood and may cause you to bleed. *WAIT 24 hours before starting back ANY Blood thinning medications:   (Heparin, Coumadin, Warfarin, Lovenox, Plavix, Aggrenox)    *Resume Pre-Procedure Medications as prescribed except Blood Thinners unless directed by your Physician or Cardiologist.     *Avoid Hot tubs and Heating pad for 24 hours to prevent dissipation of medications, you may shower to remove bandages and remaining prep residue on the skin. * If you develop a Headache, drink plenty of fluids including beverages with caffeine (Coffee, Mt. Dew etc.) and rest.  If the headache persists longer than 24 hoursor intensifies - Please call [facility] ([facility]) [phone]      * If you are DIABETIC, check your blood sugar three times a day for the next three days, the steroids will increase your blood sugar. If your blood sugar is greater than 400 have someone drive you to the nearest [address]. * If you experience any of the following problems, call the Center for Pain Management [phone] between 8:00 am - 4:30pm or After Hours [phone].     Shortness of breath    Fever of 101 F or higher    Nausea / Vomiting (not normal to you)    Increasing stiffness in the neck    Weakness or numbness in the arms or legs that is not resolving    Prolonged and increasing pain > than 4 days    ANYTHING OUT of the ORDINARY TO YOU    If YOU are experiencing a severe reaction / complication that you have never had before post procedure, call 911 or go to the nearest emergency room! All patients must have a  for transportation [place] regardless if you do or do not receive sedation. DISCHARGE SUMMARY from Nurse      PATIENT INSTRUCTIONS:    After Oral  or intravenous sedation, for 24 hours or while taking prescription Narcotics:  · Limit your activities  · Do not drive and operate hazardous machinery  · Do not make important personal or business decisions  · Do  not drink alcoholic beverages  · If you have not urinated within 8 hours after discharge, please contact your surgeon on call. Report the following to your surgeon:  · Excessive pain, swelling, redness or odor of or around the surgical area  · Temperature over 101  · Nausea and vomiting lasting longer than 4 hours or if unable to take medications  · Any signs of decreased circulation or nerve impairment to extremity: change in color, persistent  numbness, tingling, coldness or increase pain  · Any questions        What to do at Home:  Recommended activity: Activity as tolerated, NO DRIVING FOR 24 Hours post injection          *  Please give a list of your current medications to your Primary Care Provider. *  Please update this list whenever your medications are discontinued, doses are      changed, or new medications (including over-the-counter products) are added. *  Please carry medication information at all times in case of emergency situations. These are general instructions for a healthy lifestyle:    No smoking/ No tobacco products/ Avoid exposure to second hand smoke    Surgeon General's Warning:  Quitting smoking now greatly reduces serious risk to your health.     Obesity, smoking, and sedentary lifestyle greatly increases your risk for illness    A healthy diet, regular physical exercise & weight monitoring are important for maintaining a healthy lifestyle    You may be retaining fluid if you have a history of heart failure or if you experience any of the following symptoms:  Weight gain of 3 pounds or more overnight or 5 pounds in a week, increased swelling in our hands or feet or shortness of breath while lying flat in bed. Please call your doctor as soon as you notice any of these symptoms; do not wait until your next office visit. Recognize signs and symptoms of STROKE:    F-face looks uneven    A-arms unable to move or move unevenly    S-speech slurred or non-existent    T-time-call 911 as soon as signs and symptoms begin-DO NOT go       Back to bed or wait to see if you get better-TIME IS BRAIN. [employer] Activation    Thank you for requesting access to [employer]. Please follow the instructions below to securely access and download your online medical record. [employer] allows you to send messages to your doctor, view your test results, renew your prescriptions, schedule appointments, and more. How Do I Sign Up? 1. In your internet browser, go to [email or web address]  2. Click on the First Time User? Click Here link in the Sign In box. You will be redirect to the New Member Sign Up page. 3. Enter your [employer] Access Code exactly as it appears below. You will not need to use this code after youve completed the sign-up process. If you do not sign up before the expiration date, you must request a new code. [employer] Access Code: [email or web address]  Expires: 2017  7:11 AM (This is the date your [employer] access code will )    4. Enter the last four digits of your Social Security Number (xxxx) and Date of Birth (mm/dd/yyyy) as indicated and click Submit. You will be taken to the next sign-up page. 5. Create a [employer] ID. This will be your [employer] login ID and cannot be changed, so think of one that is secure and easy to remember. 6. Create a [employer] password.  You can change your password at any time. 7. Enter your Password Reset Question and Answer. This can be used at a later time if you forget your password. 8. Enter your e-mail address. You will receive e-mail notification when new information is available in [address] 9. Click Sign Up. You can now view and download portions of your medical record. 10. Click the Download Summary menu link to download a portable copy of your medical information. Additional Information    If you have questions, please visit the Frequently Asked Questions section of the [employer] website at [email or web address] Remember, [employer] is NOT to be used for urgent needs. For medical emergencies, dial 911.

## 2017-04-05 NOTE — H&P (VIEW-ONLY)
Nursing Notes    Patient presents to the office today in follow-up. Patient rates her pain at 4/10 on the numerical pain scale. Reviewed medications with counts as follows:    Rx Date filled Qty Dispensed Pill Count Last Dose Short   Fentanyl 50 mcg/hr  03/15/17 15 10, +1 on  Current patch on right upper back no   Oxycodone 30 mg - pt has 2 unfilled prescriptions. One is  02/10/17 90 74 2 days ago no   Ativan 1 mg 17 90 22 Last night no                    POC UDS was not performed in office today    Any new labs or imaging since last appointment? YES. Pt states that she had some labs done with her pcp    Have you been to an emergency room (ER) or urgent care clinic since your last visit? NO            Have you been hospitalized since your last visit? NO     If yes, where, when, and reason for visit? Have you seen or consulted any other health care providers outside of the [address] [place] [address]  since your last visit? YES     If yes, where, when, and reason for visit? pcp    HM deferred to pcp.

## 2017-04-06 RX ORDER — TIZANIDINE 4 MG/1
4-8 TABLET ORAL 3 TIMES DAILY
Qty: 360 TAB | Refills: 2 | Status: SHIPPED | OUTPATIENT
Start: 2017-04-06 | End: 2018-08-24 | Stop reason: SDUPTHER

## 2017-04-07 ENCOUNTER — DOCUMENTATION ONLY (OUTPATIENT)
Dept: PAIN MANAGEMENT | Age: 72
End: 2017-04-07

## 2017-04-07 NOTE — PROGRESS NOTES
The pt's orders for an MRI and duplex studies were faxed over to the central scheduling dept at [facility]. A fax confirmation was received and they will contact the pt to schedule an appt.

## 2017-04-18 ENCOUNTER — TELEPHONE (OUTPATIENT)
Dept: PAIN MANAGEMENT | Age: 72
End: 2017-04-18

## 2017-04-18 RX ORDER — DIAZEPAM 5 MG/1
5-20 TABLET ORAL ONCE
Status: CANCELLED | OUTPATIENT
Start: 2017-04-19 | End: 2017-04-19

## 2017-04-18 NOTE — TELEPHONE ENCOUNTER
Ms. Jerry Mckeon Left a Voice message on 04/06/2017  for follow-up status post Bilateral diagnostic lumbar facet injection (via the L5 medial branch nerve block) and Bilateral S1, S2 and S3 dorsal rami lateral branch nerve blocks  on 04/05/2017. She reports:    Pre-procedure numerical pain score: 6/10  Post-procedure numerical pain score immediately after: 3/10  Duration of relief post-procedure (if applicable):  Around 6 hours  Improvement in functional activities (if applicable): Yes  Percentage of overall improvement: 50%    COMMENTS: Patient reported that she rested and moving very well.

## 2017-04-19 ENCOUNTER — APPOINTMENT (OUTPATIENT)
Dept: GENERAL RADIOLOGY | Age: 72
End: 2017-04-19
Attending: PHYSICAL MEDICINE & REHABILITATION
Payer: MEDICARE

## 2017-04-19 ENCOUNTER — HOSPITAL ENCOUNTER (OUTPATIENT)
Age: 72
Setting detail: OUTPATIENT SURGERY
Discharge: HOME | End: 2017-04-19
Attending: PHYSICAL MEDICINE & REHABILITATION | Admitting: PHYSICAL MEDICINE & REHABILITATION
Payer: MEDICARE

## 2017-04-19 VITALS
DIASTOLIC BLOOD PRESSURE: 89 MMHG | RESPIRATION RATE: 18 BRPM | HEART RATE: 71 BPM | WEIGHT: 129 LBS | OXYGEN SATURATION: 96 % | SYSTOLIC BLOOD PRESSURE: 142 MMHG | TEMPERATURE: 98.5 F | HEIGHT: 60 IN | BODY MASS INDEX: 25.32 KG/M2

## 2017-04-19 PROCEDURE — 74011250637 HC RX REV CODE- 250/637: Performed by: PHYSICAL MEDICINE & REHABILITATION

## 2017-04-19 PROCEDURE — 76010000009 HC PAIN MGT 0 TO 30 MIN PROC: Performed by: PHYSICAL MEDICINE & REHABILITATION

## 2017-04-19 PROCEDURE — 74011250636 HC RX REV CODE- 250/636

## 2017-04-19 PROCEDURE — 77030003672 HC NDL SPN HALY -A: Performed by: PHYSICAL MEDICINE & REHABILITATION

## 2017-04-19 PROCEDURE — 77030003665 HC NDL SPN BBMI -A: Performed by: PHYSICAL MEDICINE & REHABILITATION

## 2017-04-19 PROCEDURE — 74011000250 HC RX REV CODE- 250

## 2017-04-19 RX ORDER — DIAZEPAM 5 MG/1
5-20 TABLET ORAL ONCE
Status: COMPLETED | OUTPATIENT
Start: 2017-04-19 | End: 2017-04-19

## 2017-04-19 RX ORDER — ROPIVACAINE HYDROCHLORIDE 2 MG/ML
INJECTION, SOLUTION EPIDURAL; INFILTRATION; PERINEURAL AS NEEDED
Status: DISCONTINUED | OUTPATIENT
Start: 2017-04-19 | End: 2017-04-19 | Stop reason: HOSPADM

## 2017-04-19 RX ORDER — DIAZEPAM 5 MG/1
5-20 TABLET ORAL ONCE
Status: DISCONTINUED | OUTPATIENT
Start: 2017-04-19 | End: 2017-04-19

## 2017-04-19 RX ORDER — LIDOCAINE HYDROCHLORIDE 10 MG/ML
INJECTION, SOLUTION EPIDURAL; INFILTRATION; INTRACAUDAL; PERINEURAL AS NEEDED
Status: DISCONTINUED | OUTPATIENT
Start: 2017-04-19 | End: 2017-04-19 | Stop reason: HOSPADM

## 2017-04-19 RX ADMIN — DIAZEPAM 5 MG: 5 TABLET ORAL at 09:42

## 2017-04-19 NOTE — PERIOP NOTES
Patient tolerated procedure well. No complications noted. VSS. No redness, swelling, or bleeding from injection site. Dressing dry and intact. Armband removed and shredded. Patient ambulated to main entrance alive and well, in stable condition.

## 2017-04-19 NOTE — INTERVAL H&P NOTE
H&P Update:  Jake Christiansen was seen and examined. History and physical has been reviewed. The patient has been examined.  There have been no significant clinical changes since the completion of the originally dated History and Physical.    Signed By: Elgin Mustafa MD     April 19, 2017 9:24 AM

## 2017-04-19 NOTE — DISCHARGE INSTRUCTIONS
[address] [place] [address] for Pain Management      Post Procedures Instructions    *Resume Diet and Activity as tolerated. Rest for the remainder of the day. *You may fell worse before you feel better as the numbing medications wear off before the steroids take effect if used for your procedures. *Do not use affected extremity until numbness or loss of sensation has completely resolved without assistance. *DO NOT DRIVE, operate machinery/heavey equipment for 24 hours. *DO NOT DRINK ALCOHOL for 24 hours as it may interact with the sedation if you received it and also thins your blood and may cause you to bleed. *WAIT 24 hours before starting back ANY Blood thinning medications:   (Heparin, Coumadin, Warfarin, Lovenox, Plavix, Aggrenox)    *Resume Pre-Procedure Medications as prescribed except Blood Thinners unless directed by your Physician or Cardiologist.     *Avoid Hot tubs and Heating pad for 24 hours to prevent dissipation of medications, you may shower to remove bandages and remaining prep residue on the skin. * If you develop a Headache, drink plenty of fluids including beverages with caffeine (Coffee, Mt. Dew etc.) and rest.  If the headache persists longer than 24 hoursor intensifies - Please call [facility] ([facility]) [phone]      * If you are DIABETIC, check your blood sugar three times a day for the next three days, the steroids will increase your blood sugar. If your blood sugar is greater than 400 have someone drive you to the nearest [address] [place] [address] [place]. * If you experience any of the following problems, call the [facility] [phone] between 8:00 am - 4:30pm or After Hours [phone].     Shortness of breath    Fever of 101 F or higher    Nausea / Vomiting (not normal to you)    Increasing stiffness in the neck    Weakness or numbness in the arms or legs that is not resolving    Prolonged and increasing pain > than 4 days    ANYTHING OUT of the ORDINARY TO YOU    If YOU are experiencing a severe reaction / complication that you have never had before post procedure, call 911 or go to the nearest emergency room! All patients must have a  for transportation [place] regardless if you do or do not receive sedation. DISCHARGE SUMMARY from Nurse      PATIENT INSTRUCTIONS:    After Oral  or intravenous sedation, for 24 hours or while taking prescription Narcotics:  · Limit your activities  · Do not drive and operate hazardous machinery  · Do not make important personal or business decisions  · Do  not drink alcoholic beverages  · If you have not urinated within 8 hours after discharge, please contact your surgeon on call. Report the following to your surgeon:  · Excessive pain, swelling, redness or odor of or around the surgical area  · Temperature over 101  · Nausea and vomiting lasting longer than 4 hours or if unable to take medications  · Any signs of decreased circulation or nerve impairment to extremity: change in color, persistent  numbness, tingling, coldness or increase pain  · Any questions        What to do at Home:  Recommended activity: Activity as tolerated, NO DRIVING FOR 24 Hours post injection          *  Please give a list of your current medications to your Primary Care Provider. *  Please update this list whenever your medications are discontinued, doses are      changed, or new medications (including over-the-counter products) are added. *  Please carry medication information at all times in case of emergency situations. These are general instructions for a healthy lifestyle:    No smoking/ No tobacco products/ Avoid exposure to second hand smoke    Surgeon General's Warning:  Quitting smoking now greatly reduces serious risk to your health.     Obesity, smoking, and sedentary lifestyle greatly increases your risk for illness    A healthy diet, regular physical exercise & weight monitoring are important for maintaining a healthy lifestyle    You may be retaining fluid if you have a history of heart failure or if you experience any of the following symptoms:  Weight gain of 3 pounds or more overnight or 5 pounds in a week, increased swelling in our hands or feet or shortness of breath while lying flat in bed. Please call your doctor as soon as you notice any of these symptoms; do not wait until your next office visit. Recognize signs and symptoms of STROKE:    F-face looks uneven    A-arms unable to move or move unevenly    S-speech slurred or non-existent    T-time-call 911 as soon as signs and symptoms begin-DO NOT go       Back to bed or wait to see if you get better-TIME IS BRAIN. [employer] Activation    Thank you for requesting access to [employer]. Please follow the instructions below to securely access and download your online medical record. [employer] allows you to send messages to your doctor, view your test results, renew your prescriptions, schedule appointments, and more. How Do I Sign Up? 1. In your internet browser, go to [email or web address]  2. Click on the First Time User? Click Here link in the Sign In box. You will be redirect to the New Member Sign Up page. 3. Enter your [employer] Access Code exactly as it appears below. You will not need to use this code after youve completed the sign-up process. If you do not sign up before the expiration date, you must request a new code. [employer] Access Code: [email or web address]  Expires: 2017  7:11 AM (This is the date your [employer] access code will )    4. Enter the last four digits of your Social Security Number (xxxx) and Date of Birth (mm/dd/yyyy) as indicated and click Submit. You will be taken to the next sign-up page. 5. Create a [employer] ID. This will be your [employer] login ID and cannot be changed, so think of one that is secure and easy to remember. 6. Create a [employer] password.  You can change your password at any time. 7. Enter your Password Reset Question and Answer. This can be used at a later time if you forget your password. 8. Enter your e-mail address. You will receive e-mail notification when new information is available in [address] 9. Click Sign Up. You can now view and download portions of your medical record. 10. Click the Download Summary menu link to download a portable copy of your medical information. Additional Information    If you have questions, please visit the Frequently Asked Questions section of the [employer] website at [email or web address] Remember, [employer] is NOT to be used for urgent needs. For medical emergencies, dial 911.

## 2017-04-19 NOTE — H&P (VIEW-ONLY)
The pt's orders for an MRI and duplex studies were faxed over to the central scheduling dept at [facility]. A fax confirmation was received and they will contact the pt to schedule an appt.

## 2017-04-20 ENCOUNTER — TELEPHONE (OUTPATIENT)
Dept: PAIN MANAGEMENT | Age: 72
End: 2017-04-20

## 2017-04-20 NOTE — TELEPHONE ENCOUNTER
Ms. Mariia Weiss was contacted for follow-up status post Bilateral diagnostic lumbar facet injection (via the L5 medial branch nerve block)Bilateral S1, S2 and S3 dorsal rami lateral branch nerve blocks   on 04/05/2017. She reports:    Pre-procedure numerical pain score: 6/10  Post-procedure numerical pain score immediately after: 2/10  Duration of relief post-procedure (if applicable): 5 hours  Improvement in functional activities (if applicable):  Yes  Percentage of overall improvement: 60%    COMMENTS: She was walking around her yard pulling out weed.

## 2017-04-24 NOTE — PROCEDURES
[facility]    LUMBAR DORSAL RAMUS AND LATERAL BRANCH BLOCKS             PROCEDURE REPORT      PATIENT:  Larkin Dakins  YOB: 1945  DATE OF SERVICE:  4/19/2017  [facility]    PRE-PROCEDURE DIAGNOSIS:  See Above    POST-PROCEDURE DIAGNOSIS:  See Above                PROCEDURE:  1. Bilateral diagnostic lumbar facet injection (via the L5 medial branch nerve block) (66033, 50 )        2. Bilateral S1, S2 and S3 dorsal rami lateral branch nerve              blocks (64450 x3)        3. Fluoroscopic needle guidance, spinal (for the lateral branch blocks) (21771)            ANESTHESIA:  Local with oral sedation. See Medication Administration Record for specific medications and dosage. COMPLICATIONS: None. PHYSICIAN:  Zenaida Oconnell MD    PRE-PROCEDURE NOTE:  Pre-procedural assessment of the patient was performed including a limited history and physical examination. The details of the procedure were discussed with the patient, including the risks, benefits and alternative options and an informed consent was obtained. The patients NPO status, if necessary for the specific procedure and/or administration of moderate intravenous sedation, if utilized, and availability of a responsible adult to escort the patient following the procedure were confirmed. PROCEDURE NOTE:  The patient was brought to the procedure suite and positioned on the fluoroscopy table in the prone position. Physiologic monitors were applied and supplemental oxygen was administered via nasal cannula. The skin was prepped in the standard surgical fashion and sterile drapes were applied over the procedure site. Please refer to the Flowsheet for documentation of the patients vital signs and the Medication Administration Report for any oral sedation administered prior to or during the procedure.        1% Lidocaine was utilized for local anesthesia. Under AP fluoroscopic guidance a 22-gauge, 3-1/2 inch short bevel spinal needle was advanced to the superior margin of the sacral ala to block the respective L5 medial branch nerve(s). Following this, a 22-gauge, 3-1/2 inch short bevel spinal needle was advanced to the lateral aspect of the S1 neural foramen and repeated in the same fashion at the S2 and S3 neural foramina. After all needles were placed, 1 mL of 0.2% ropivacaine was injected at each location after the negative aspiration of blood, air or CSF. The needles were removed and the stilets were replaced. The procedure was performed on the contralateral side in the same fashion and at the same levels using the same volume of local anesthetic following negative aspiration of blood, air or CSF. The needles were removed intact. The area was thoroughly cleaned and sterile bandages applied as necessary. The patient tolerated the procedure well and vital signs remained stable throughout the procedure. The patient was assessed immediately following the procedure and was noted to have greater than 50% reduction in pain (a reduction from 8/10 to 3/10 in severity of lumbar pain). Based on these results, the patient was considered an appropriate candidate for radiofrequency ablation of the above-mentioned medial and lateral branch nerves and will be scheduled for that procedure. POST-PROCEDURE COURSE:   The patient was escorted from the procedure suite in satisfactory condition and recovered per facility protocol based on the type of procedure performed and/or the sedation utilized. The patient did not experience any adverse events and remained hemodynamically stable during the post-procedure period. DISCHARGE NOTE:  Upon discharge, the patient was able to tolerate fluids and was in no acute distress. The patient was oriented to person, place and time and vital signs were stable.  Appropriate post-procedure instructions were provided and explained to the patient in detail and all questions were answered.     Shama Crowder MD 4/24/2017 7:15 PM

## 2017-04-28 ENCOUNTER — TELEPHONE (OUTPATIENT)
Dept: PAIN MANAGEMENT | Age: 72
End: 2017-04-28

## 2017-04-28 NOTE — TELEPHONE ENCOUNTER
The pt was called and given the results of her most recent tests per the provider's review. She verbalized understanding and has no questions at this time. She has already been scheduled for one of the suggested injections by the provider. Pt will talk more with the provider about her results when she comes back to see her. Pt asked to speak to the procedure . She was made aware that she is not in today but that a message can be given to her to call the pt. The pt was ok with this. Message placed on 's desk.

## 2017-05-01 RX ORDER — MIDAZOLAM HYDROCHLORIDE 1 MG/ML
.5-6 INJECTION, SOLUTION INTRAMUSCULAR; INTRAVENOUS
Status: CANCELLED | OUTPATIENT
Start: 2017-05-01

## 2017-05-01 RX ORDER — SODIUM CHLORIDE 0.9 % (FLUSH) 0.9 %
5-10 SYRINGE (ML) INJECTION AS NEEDED
Status: CANCELLED | OUTPATIENT
Start: 2017-05-01

## 2017-05-02 DIAGNOSIS — Z86.718 HISTORY OF DVT (DEEP VEIN THROMBOSIS): ICD-10-CM

## 2017-05-02 DIAGNOSIS — M79.89 PAIN AND SWELLING OF RIGHT LOWER LEG: ICD-10-CM

## 2017-05-02 DIAGNOSIS — M51.36 DDD (DEGENERATIVE DISC DISEASE), LUMBAR: ICD-10-CM

## 2017-05-02 DIAGNOSIS — M79.89 PAIN AND SWELLING OF LOWER LEG, LEFT: ICD-10-CM

## 2017-05-02 DIAGNOSIS — M79.661 PAIN AND SWELLING OF RIGHT LOWER LEG: ICD-10-CM

## 2017-05-02 DIAGNOSIS — M79.662 PAIN AND SWELLING OF LOWER LEG, LEFT: ICD-10-CM

## 2017-05-02 DIAGNOSIS — M46.1 SACROILIITIS (HCC): ICD-10-CM

## 2017-05-02 DIAGNOSIS — M47.816 SPONDYLOSIS OF LUMBAR REGION WITHOUT MYELOPATHY OR RADICULOPATHY: ICD-10-CM

## 2017-05-12 RX ORDER — SODIUM CHLORIDE 0.9 % (FLUSH) 0.9 %
5-10 SYRINGE (ML) INJECTION AS NEEDED
Status: CANCELLED | OUTPATIENT
Start: 2017-05-15

## 2017-05-12 RX ORDER — MIDAZOLAM HYDROCHLORIDE 1 MG/ML
.5-6 INJECTION, SOLUTION INTRAMUSCULAR; INTRAVENOUS
Status: CANCELLED | OUTPATIENT
Start: 2017-05-15

## 2017-05-15 ENCOUNTER — HOSPITAL ENCOUNTER (OUTPATIENT)
Age: 72
Setting detail: OUTPATIENT SURGERY
Discharge: HOME | End: 2017-05-15
Attending: PHYSICAL MEDICINE & REHABILITATION | Admitting: PHYSICAL MEDICINE & REHABILITATION
Payer: MEDICARE

## 2017-05-15 ENCOUNTER — APPOINTMENT (OUTPATIENT)
Dept: GENERAL RADIOLOGY | Age: 72
End: 2017-05-15
Attending: PHYSICAL MEDICINE & REHABILITATION
Payer: MEDICARE

## 2017-05-15 VITALS
RESPIRATION RATE: 18 BRPM | HEIGHT: 60 IN | OXYGEN SATURATION: 98 % | BODY MASS INDEX: 25.32 KG/M2 | WEIGHT: 129 LBS | TEMPERATURE: 98.2 F | DIASTOLIC BLOOD PRESSURE: 84 MMHG | HEART RATE: 68 BPM | SYSTOLIC BLOOD PRESSURE: 145 MMHG

## 2017-05-15 PROCEDURE — 77030030797 HC PRB ENDOSC SINRGY AVNM -G: Performed by: PHYSICAL MEDICINE & REHABILITATION

## 2017-05-15 PROCEDURE — 76010000010 HC PAIN MGT 31 TO 60 MIN PROC: Performed by: PHYSICAL MEDICINE & REHABILITATION

## 2017-05-15 PROCEDURE — 99153 MOD SED SAME PHYS/QHP EA: CPT | Performed by: PHYSICAL MEDICINE & REHABILITATION

## 2017-05-15 PROCEDURE — 99152 MOD SED SAME PHYS/QHP 5/>YRS: CPT | Performed by: PHYSICAL MEDICINE & REHABILITATION

## 2017-05-15 PROCEDURE — 74011000250 HC RX REV CODE- 250

## 2017-05-15 PROCEDURE — 74011250636 HC RX REV CODE- 250/636

## 2017-05-15 PROCEDURE — 77030031505 HC PRB PAIN MGMT SNRGY BAYL -E: Performed by: PHYSICAL MEDICINE & REHABILITATION

## 2017-05-15 PROCEDURE — 77030020508 HC PD GRND GENRTR BAYL -A: Performed by: PHYSICAL MEDICINE & REHABILITATION

## 2017-05-15 RX ORDER — MIDAZOLAM HYDROCHLORIDE 1 MG/ML
INJECTION, SOLUTION INTRAMUSCULAR; INTRAVENOUS AS NEEDED
Status: DISCONTINUED | OUTPATIENT
Start: 2017-05-15 | End: 2017-05-15 | Stop reason: HOSPADM

## 2017-05-15 RX ORDER — DEXAMETHASONE SODIUM PHOSPHATE 100 MG/10ML
INJECTION INTRAMUSCULAR; INTRAVENOUS AS NEEDED
Status: DISCONTINUED | OUTPATIENT
Start: 2017-05-15 | End: 2017-05-15 | Stop reason: HOSPADM

## 2017-05-15 RX ORDER — LIDOCAINE HYDROCHLORIDE 10 MG/ML
INJECTION, SOLUTION EPIDURAL; INFILTRATION; INTRACAUDAL; PERINEURAL AS NEEDED
Status: DISCONTINUED | OUTPATIENT
Start: 2017-05-15 | End: 2017-05-15 | Stop reason: HOSPADM

## 2017-05-15 RX ORDER — LIDOCAINE HYDROCHLORIDE 20 MG/ML
INJECTION, SOLUTION EPIDURAL; INFILTRATION; INTRACAUDAL; PERINEURAL AS NEEDED
Status: DISCONTINUED | OUTPATIENT
Start: 2017-05-15 | End: 2017-05-15 | Stop reason: HOSPADM

## 2017-05-15 RX ORDER — FENTANYL CITRATE 50 UG/ML
INJECTION, SOLUTION INTRAMUSCULAR; INTRAVENOUS AS NEEDED
Status: DISCONTINUED | OUTPATIENT
Start: 2017-05-15 | End: 2017-05-15 | Stop reason: HOSPADM

## 2017-05-15 NOTE — DISCHARGE INSTRUCTIONS
[address] [place] [address] for Pain Management      Post Procedures Instructions    *Resume Diet and Activity as tolerated. Rest for the remainder of the day. *You may fell worse before you feel better as the numbing medications wear off before the steroids take effect if used for your procedures. *Do not use affected extremity until numbness or loss of sensation has completely resolved without assistance. *DO NOT DRIVE, operate machinery/heavey equipment for 24 hours. *DO NOT DRINK ALCOHOL for 24 hours as it may interact with the sedation if you received it and also thins your blood and may cause you to bleed. *WAIT 24 hours before starting back ANY Blood thinning medications:   (Heparin, Coumadin, Warfarin, Lovenox, Plavix, Aggrenox)    *Resume Pre-Procedure Medications as prescribed except Blood Thinners unless directed by your Physician or Cardiologist.     *Avoid Hot tubs and Heating pad for 24 hours to prevent dissipation of medications, you may shower to remove bandages and remaining prep residue on the skin. * If you develop a Headache, drink plenty of fluids including beverages with caffeine (Coffee, Mt. Dew etc.) and rest.  If the headache persists longer than 24 hoursor intensifies - Please call [facility] ([facility]) [phone]      * If you are DIABETIC, check your blood sugar three times a day for the next three days, the steroids will increase your blood sugar. If your blood sugar is greater than 400 have someone drive you to the nearest [address]. * If you experience any of the following problems, call the [facility] [phone] between 8:00 am - 4:30pm or After Hours [phone].     Shortness of breath    Fever of 101 F or higher    Nausea / Vomiting (not normal to you)    Increasing stiffness in the neck    Weakness or numbness in the arms or legs that is not resolving    Prolonged and increasing pain > than 4 days    ANYTHING OUT of the ORDINARY TO YOU    If YOU are experiencing a severe reaction / complication that you have never had before post procedure, call 911 or go to the nearest emergency room! All patients must have a  for transportation [place] regardless if you do or do not receive sedation. DISCHARGE SUMMARY from Nurse      PATIENT INSTRUCTIONS:    After Oral  or intravenous sedation, for 24 hours or while taking prescription Narcotics:  · Limit your activities  · Do not drive and operate hazardous machinery  · Do not make important personal or business decisions  · Do  not drink alcoholic beverages  · If you have not urinated within 8 hours after discharge, please contact your surgeon on call. Report the following to your surgeon:  · Excessive pain, swelling, redness or odor of or around the surgical area  · Temperature over 101  · Nausea and vomiting lasting longer than 4 hours or if unable to take medications  · Any signs of decreased circulation or nerve impairment to extremity: change in color, persistent  numbness, tingling, coldness or increase pain  · Any questions        What to do at Home:  Recommended activity: Activity as tolerated, NO DRIVING FOR 24 Hours post injection          *  Please give a list of your current medications to your Primary Care Provider. *  Please update this list whenever your medications are discontinued, doses are      changed, or new medications (including over-the-counter products) are added. *  Please carry medication information at all times in case of emergency situations. These are general instructions for a healthy lifestyle:    No smoking/ No tobacco products/ Avoid exposure to second hand smoke    Surgeon General's Warning:  Quitting smoking now greatly reduces serious risk to your health.     Obesity, smoking, and sedentary lifestyle greatly increases your risk for illness    A healthy diet, regular physical exercise & weight monitoring are important for maintaining a healthy lifestyle    You may be retaining fluid if you have a history of heart failure or if you experience any of the following symptoms:  Weight gain of 3 pounds or more overnight or 5 pounds in a week, increased swelling in our hands or feet or shortness of breath while lying flat in bed. Please call your doctor as soon as you notice any of these symptoms; do not wait until your next office visit. Recognize signs and symptoms of STROKE:    F-face looks uneven    A-arms unable to move or move unevenly    S-speech slurred or non-existent    T-time-call 911 as soon as signs and symptoms begin-DO NOT go       Back to bed or wait to see if you get better-TIME IS BRAIN. [employer] Activation    Thank you for requesting access to [facility] [employer]. Please follow the instructions below to securely access and download your online medical record. [employer] allows you to send messages to your doctor, view your test results, renew your prescriptions, schedule appointments, and more. How Do I Sign Up? 1. In your internet browser, go to [email or web address]  2. Click on the First Time User? Click Here link in the Sign In box. You will be redirect to the New Member Sign Up page. 3. Enter your [employer] Access Code exactly as it appears below. You will not need to use this code after youve completed the sign-up process. If you do not sign up before the expiration date, you must request a new code. [employer] Access Code: [email or web address]  Expires: 2017  7:11 AM (This is the date your [employer] access code will )    4. Enter the last four digits of your Social Security Number (xxxx) and Date of Birth (mm/dd/yyyy) as indicated and click Submit. You will be taken to the next sign-up page. 5. Create a [employer] ID. This will be your [employer] login ID and cannot be changed, so think of one that is secure and easy to remember. 6. Create a [employer] password.  You can change your password at any time. 7. Enter your Password Reset Question and Answer. This can be used at a later time if you forget your password. 8. Enter your e-mail address. You will receive e-mail notification when new information is available in [address] 9. Click Sign Up. You can now view and download portions of your medical record. 10. Click the Download Summary menu link to download a portable copy of your medical information. Additional Information    If you have questions, please visit the Frequently Asked Questions section of the [employer] website at [email or web address] Remember, [employer] is NOT to be used for urgent needs. For medical emergencies, dial 911.

## 2017-05-15 NOTE — PROCEDURES
[facility]    RADIOFREQUENCY NEUROLYSIS OF LUMBAR DORSAL RAMUS AND LATERAL BRANCH NERVES PROCEDURE REPORT      PATIENT:  Viola Meyers OF BIRTH:  1945  DATE OF SERVICE:  5/15/2017  [facility]    PRE-PROCEDURE DIAGNOSIS:  See Above    POST-PROCEDURE DIAGNOSIS:  See Above                PROCEDURE:         1. Right radiofrequency neurolysis of lumbar dorsal ramus branch nerve, L5 (70193)  1. Right radiofrequency neurolysis of sacral lateral branch nerves, S1-3 (64640 x3)  2. Fluoroscopic needle guidance, spinal (for lateral branch nerve neurolysis) (93532)  3. Supervision of moderate sedation (57143)  4. Additional 15 minutes of supervised moderate sedation (21024 times 2)    ANESTHESIA:  Local with moderate IV sedation. See Medication Administration Record for specific medications and dosage. COMPLICATIONS: None. PHYSICIAN:  Teodoro Mendiola MD    PRE-PROCEDURE NOTE:  Pre-procedural assessment of the patient was performed including a limited history and physical examination. The details of the procedure were discussed with the patient, including the risks, benefits and alternative options and an informed consent was obtained. The patients NPO status, if necessary for the specific procedure and/or administration of moderate intravenous sedation, if utilized, and availability of a responsible adult to escort the patient following the procedure were confirmed. A peripheral intravenous cannula was placed without difficulty and lactated Ringers solution administered. See nursing notes for details. PROCEDURE NOTE:  The patient was brought to the procedure suite and positioned on the fluoroscopy table in the prone position. Physiologic monitors were applied and supplemental oxygen was administered via nasal cannula.  The skin was prepped in the standard surgical fashion and sterile drapes were applied over the procedure site. Please refer to the Flowsheet for documentation of the patients vital signs and the Medication Administration Record for any oral and/or intravenous sedation administered prior to or during the procedure. 1% Lidocaine was utilized for local anesthesia. Under AP fluoroscopic guidance, a SInergy (Lumbar-sacral) radiofrequency needle 10cm 16gauge radiofrequency needle with a 10 mm curved active tip was placed over the superior margin of the sacral ala to thermocoagulate the L5 dorsal ramus branch nerve. Following this, a similar radiofrequency needle was advanced to the lateral aspect of the S1 neural foramen approximating respective level lateral branches and repeated in the same fashion at the S2 and S3 neural foramina. Correct needle tip position was confirmed with AP and lateral fluoroscopy. After each individual needle was placed, sensory and motor testing were performed, at 50 Hz and 2 Hz, respectively, which elicited ipsilateral deep local back discomfort without evidence of motor stimulation in the ipsilateral gluteal muscles or lower extremity. Following this, 1/2 to 1 mL of lidocaine 2% was injected after the negative aspiration of blood, air or CSF. Final correct needle placement was then confirmed by viewing each needle in AP and lateral fluoroscopic views. Then, medial and/or lateral branch nerve radiofrequency neurolysis was then performed at each level for 2 and 1/2 minutes at 60° centigrade x 1 cycle. Following this, 1/3ml  to 1/2ml of a mixture of lidocaine 1% admixed with dexamethasone 2mg [10mg/ml] was injected through each radiofrequency needle after negative aspiration and before removing each needle. Then, all needles were removed intact. The area was thoroughly cleaned and sterile bandages applied as necessary.  The patient tolerated the procedure well without complication and the vital signs remained stable throughout the procedure. POST-PROCEDURE COURSE:   The patient was escorted from the procedure suite in satisfactory condition and recovered per facility protocol based on the type of procedure performed and/or the sedation utilized. The patient did not experience any adverse events and remained hemodynamically stable during the post-procedure period. DISCHARGE NOTE:  Upon discharge, the patient was able to tolerate fluids and was in no acute distress. The patient was oriented to person, place and time and vital signs were stable. Appropriate post-procedure instructions were provided and explained to the patient in detail and all questions were answered.     Rad Thorne MD 5/15/2017 12:47 PM

## 2017-05-15 NOTE — H&P
15 May 2017, 9:30AM.   Patient seen and examined prior to procedure. Chart and data reviewed. No significant interval changes from previous evaluation noted. Stable for procedure as intended and discussed.  [facility]

## 2017-05-26 RX ORDER — MIDAZOLAM HYDROCHLORIDE 1 MG/ML
.5-6 INJECTION, SOLUTION INTRAMUSCULAR; INTRAVENOUS
Status: CANCELLED | OUTPATIENT
Start: 2017-05-30

## 2017-05-26 RX ORDER — SODIUM CHLORIDE 0.9 % (FLUSH) 0.9 %
5-10 SYRINGE (ML) INJECTION AS NEEDED
Status: CANCELLED | OUTPATIENT
Start: 2017-05-30

## 2017-05-30 ENCOUNTER — APPOINTMENT (OUTPATIENT)
Dept: GENERAL RADIOLOGY | Age: 72
End: 2017-05-30
Attending: PHYSICAL MEDICINE & REHABILITATION
Payer: MEDICARE

## 2017-05-30 ENCOUNTER — HOSPITAL ENCOUNTER (OUTPATIENT)
Age: 72
Setting detail: OUTPATIENT SURGERY
Discharge: HOME | End: 2017-05-30
Attending: PHYSICAL MEDICINE & REHABILITATION | Admitting: PHYSICAL MEDICINE & REHABILITATION
Payer: MEDICARE

## 2017-05-30 VITALS
WEIGHT: 126 LBS | RESPIRATION RATE: 12 BRPM | BODY MASS INDEX: 24.61 KG/M2 | SYSTOLIC BLOOD PRESSURE: 169 MMHG | HEART RATE: 72 BPM | DIASTOLIC BLOOD PRESSURE: 80 MMHG | OXYGEN SATURATION: 98 % | TEMPERATURE: 98.3 F

## 2017-05-30 PROCEDURE — 99152 MOD SED SAME PHYS/QHP 5/>YRS: CPT | Performed by: PHYSICAL MEDICINE & REHABILITATION

## 2017-05-30 PROCEDURE — 76010000010 HC PAIN MGT 31 TO 60 MIN PROC: Performed by: PHYSICAL MEDICINE & REHABILITATION

## 2017-05-30 PROCEDURE — 77030031505 HC PRB PAIN MGMT SNRGY BAYL -E: Performed by: PHYSICAL MEDICINE & REHABILITATION

## 2017-05-30 PROCEDURE — 77030020508 HC PD GRND GENRTR BAYL -A: Performed by: PHYSICAL MEDICINE & REHABILITATION

## 2017-05-30 PROCEDURE — 77030030797 HC PRB ENDOSC SINRGY AVNM -G: Performed by: PHYSICAL MEDICINE & REHABILITATION

## 2017-05-30 PROCEDURE — 74011000250 HC RX REV CODE- 250

## 2017-05-30 PROCEDURE — 74011250636 HC RX REV CODE- 250/636

## 2017-05-30 PROCEDURE — 99153 MOD SED SAME PHYS/QHP EA: CPT | Performed by: PHYSICAL MEDICINE & REHABILITATION

## 2017-05-30 RX ORDER — DEXAMETHASONE SODIUM PHOSPHATE 100 MG/10ML
INJECTION INTRAMUSCULAR; INTRAVENOUS AS NEEDED
Status: DISCONTINUED | OUTPATIENT
Start: 2017-05-30 | End: 2017-05-30 | Stop reason: HOSPADM

## 2017-05-30 RX ORDER — MIDAZOLAM HYDROCHLORIDE 1 MG/ML
.5-6 INJECTION, SOLUTION INTRAMUSCULAR; INTRAVENOUS
Status: DISCONTINUED | OUTPATIENT
Start: 2017-05-30 | End: 2017-05-30 | Stop reason: HOSPADM

## 2017-05-30 RX ORDER — SODIUM CHLORIDE 0.9 % (FLUSH) 0.9 %
5-10 SYRINGE (ML) INJECTION AS NEEDED
Status: DISCONTINUED | OUTPATIENT
Start: 2017-05-30 | End: 2017-05-30 | Stop reason: HOSPADM

## 2017-05-30 RX ORDER — LIDOCAINE HYDROCHLORIDE 10 MG/ML
INJECTION, SOLUTION EPIDURAL; INFILTRATION; INTRACAUDAL; PERINEURAL AS NEEDED
Status: DISCONTINUED | OUTPATIENT
Start: 2017-05-30 | End: 2017-05-30 | Stop reason: HOSPADM

## 2017-05-30 RX ORDER — LIDOCAINE HYDROCHLORIDE 20 MG/ML
INJECTION, SOLUTION EPIDURAL; INFILTRATION; INTRACAUDAL; PERINEURAL AS NEEDED
Status: DISCONTINUED | OUTPATIENT
Start: 2017-05-30 | End: 2017-05-30 | Stop reason: HOSPADM

## 2017-05-30 RX ORDER — FENTANYL CITRATE 50 UG/ML
INJECTION, SOLUTION INTRAMUSCULAR; INTRAVENOUS AS NEEDED
Status: DISCONTINUED | OUTPATIENT
Start: 2017-05-30 | End: 2017-05-30 | Stop reason: HOSPADM

## 2017-05-30 NOTE — INTERVAL H&P NOTE
H&P Update:  Santiago Tejada was seen and examined. History and physical has been reviewed. The patient has been examined.  There have been no significant clinical changes since the completion of the originally dated History and Physical.    Signed By: Dedrick Carolina MD     May 30, 2017 11:08 AM

## 2017-05-30 NOTE — PROCEDURES
[facility]    RADIOFREQUENCY NEUROLYSIS OF LUMBAR DORSAL RAMUS AND LATERAL BRANCH NERVES PROCEDURE REPORT      PATIENT:  Estefany Party OF BIRTH:  1945  DATE OF SERVICE:  5/30/2017  [facility]    PRE-PROCEDURE DIAGNOSIS:  See Above    POST-PROCEDURE DIAGNOSIS:  See Above                PROCEDURE:         1. Left radiofrequency neurolysis of lumbar dorsal ramus branch nerve, L5 (85364)  1. Left radiofrequency neurolysis of sacral lateral branch nerves, S1-3 (64640 x3)  2. Fluoroscopic needle guidance, spinal (for lateral branch nerve neurolysis) (66245)  3. Supervision of moderate sedation (23562)  4. Additional 15 minutes of supervised moderate sedation (33377 times 2)    ANESTHESIA:  Local with moderate IV sedation. See Medication Administration Record for specific medications and dosage. COMPLICATIONS: None. PHYSICIAN:  Xiang Lam MD    PRE-PROCEDURE NOTE:  Pre-procedural assessment of the patient was performed including a limited history and physical examination. The details of the procedure were discussed with the patient, including the risks, benefits and alternative options and an informed consent was obtained. The patients NPO status, if necessary for the specific procedure and/or administration of moderate intravenous sedation, if utilized, and availability of a responsible adult to escort the patient following the procedure were confirmed. A peripheral intravenous cannula was placed without difficulty and lactated Ringers solution administered. See nursing notes for details. PROCEDURE NOTE:  The patient was brought to the procedure suite and positioned on the fluoroscopy table in the prone position. Physiologic monitors were applied and supplemental oxygen was administered via nasal cannula.  The skin was prepped in the standard surgical fashion and sterile drapes were applied over the procedure site. Please refer to the Flowsheet for documentation of the patients vital signs and the Medication Administration Record for any oral and/or intravenous sedation administered prior to or during the procedure. 1% Lidocaine was utilized for local anesthesia. Under AP fluoroscopic guidance, a SInergy (Lumbar-sacral) radiofrequency needle 10cm 16gauge radiofrequency needle with a 10 mm curved active tip was placed over the superior margin of the sacral ala to thermocoagulate the L5 dorsal ramus branch nerve. Following this, a similar radiofrequency needle was advanced to the lateral aspect of the S1 neural foramen approximating respective level lateral branches and repeated in the same fashion at the S2 and S3 neural foramina. Correct needle tip position was confirmed with AP and lateral fluoroscopy. After each individual needle was placed, sensory and motor testing were performed, at 50 Hz and 2 Hz, respectively, which elicited ipsilateral deep local back discomfort without evidence of motor stimulation in the ipsilateral gluteal muscles or lower extremity. Following this, 1/2 to 1 mL of lidocaine 2% was injected after the negative aspiration of blood, air or CSF. Final correct needle placement was then confirmed by viewing each needle in AP and lateral fluoroscopic views. Then, medial and/or lateral branch nerve radiofrequency neurolysis was then performed at each level for 2 and 1/2 minutes at 60° centigrade x 1 cycle. Following this, 1/3ml  to 1/2ml of a mixture of lidocaine 1% admixed with dexamethasone 2mg [10mg/ml] was injected through each radiofrequency needle after negative aspiration and before removing each needle. Then, all needles were removed intact. The area was thoroughly cleaned and sterile bandages applied as necessary.  The patient tolerated the procedure well without complication and the vital signs remained stable throughout the procedure. POST-PROCEDURE COURSE:   The patient was escorted from the procedure suite in satisfactory condition and recovered per facility protocol based on the type of procedure performed and/or the sedation utilized. The patient did not experience any adverse events and remained hemodynamically stable during the post-procedure period. DISCHARGE NOTE:  Upon discharge, the patient was able to tolerate fluids and was in no acute distress. The patient was oriented to person, place and time and vital signs were stable. Appropriate post-procedure instructions were provided and explained to the patient in detail and all questions were answered.     Gerardo Downs MD 5/30/2017 1:34 PM

## 2017-05-30 NOTE — H&P (VIEW-ONLY)
15 May 2017, 9:30AM.   Patient seen and examined prior to procedure. Chart and data reviewed. No significant interval changes from previous evaluation noted. Stable for procedure as intended and discussed.  [facility]

## 2017-05-30 NOTE — DISCHARGE INSTRUCTIONS
[address] Instructions    * Do not drive a car, operate heavy machinery or dangerous equipment for 24 hours. * Activity as tolerated; rest for the remainder of the day. * Resume pre-block medications including those for your family doctor. * Do not drink alcoholic beverages for 24 hours. Alcohol and the medications you have received may interact and cause an adverse reaction. * You may feel better this evening and worse tomorrow, as the numbing medications wears off and the steroid has yet to begin to work. After 48 hrs the steroid should begin to release bringing you relief. * You may shower this evening and remove any bandages. * Avoid hot tubs and heating pads for 24 hours. You may use cold packs on the procedure site as tolerated for the first 24 hours. * If a headache develops, drink plenty of fluids and rest.  Take over the counter medications for headache if needed. If the headache continues longer than 24 hours, call MD at the [address] [place] [address]. [phone]    * Continue taking pain medications as needed. * You may resume your regular diet if tolerated. Otherwise, start with sips of water and advance slowly. * If Diabetic: check your blood sugar three times a day for the next 3 days. If your sugar is greater than 300 call your family doctor. If your sugar is greater than 400, have someone transport you to the nearest Emergency Room. * If you experience any of the following problems, Please Call the [address] [place] [address] at [phone].         * Shortness of Breath    * Fever of 101 or higher    * Nausea / Vomiting    * Severe Headache    * Weakness or numbness in arms or legs that is not      resolving    * Prolonged increase in pain greater than 4 days      DISCHARGE SUMMARY from Nurse      PATIENT INSTRUCTIONS:    After oral sedation, for 24 hours or while taking prescription Narcotics:  · Limit your activities  · Do not drive and operate hazardous machinery  · Do not make important personal or business decisions  · Do  not drink alcoholic beverages  · If you have not urinated within 8 hours after discharge, please contact your surgeon on call. Report the following to your surgeon:  · Excessive pain, swelling, redness or odor of or around the surgical area  · Temperature over 101  · Nausea and vomiting lasting longer than 4 hours or if unable to take medications  · Any signs of decreased circulation or nerve impairment to extremity: change in color, persistent  numbness, tingling, coldness or increase pain  · Any questions            What to do at Home:  Recommended activity: Activity as tolerated, NO DRIVING FOR 12 Hours post injection          *  Please give a list of your current medications to your Primary Care Provider. *  Please update this list whenever your medications are discontinued, doses are      changed, or new medications (including over-the-counter products) are added. *  Please carry medication information at all times in case of emergency situations. These are general instructions for a healthy lifestyle:    No smoking/ No tobacco products/ Avoid exposure to second hand smoke    Surgeon General's Warning:  Quitting smoking now greatly reduces serious risk to your health. Obesity, smoking, and sedentary lifestyle greatly increases your risk for illness    A healthy diet, regular physical exercise & weight monitoring are important for maintaining a healthy lifestyle    You may be retaining fluid if you have a history of heart failure or if you experience any of the following symptoms:  Weight gain of 3 pounds or more overnight or 5 pounds in a week, increased swelling in our hands or feet or shortness of breath while lying flat in bed. Please call your doctor as soon as you notice any of these symptoms; do not wait until your next office visit.     Recognize signs and symptoms of STROKE:    F-face looks uneven    A-arms unable to move or move unevenly    S-speech slurred or non-existent    T-time-call 911 as soon as signs and symptoms begin-DO NOT go       Back to bed or wait to see if you get better-TIME IS BRAIN.

## 2017-06-29 ENCOUNTER — OFFICE VISIT (OUTPATIENT)
Dept: PAIN MANAGEMENT | Age: 72
End: 2017-06-29

## 2017-06-29 VITALS
WEIGHT: 126 LBS | BODY MASS INDEX: 24.61 KG/M2 | HEART RATE: 61 BPM | DIASTOLIC BLOOD PRESSURE: 80 MMHG | SYSTOLIC BLOOD PRESSURE: 135 MMHG

## 2017-06-29 DIAGNOSIS — G89.29 CHRONIC INTRACTABLE HEADACHE, UNSPECIFIED HEADACHE TYPE: ICD-10-CM

## 2017-06-29 DIAGNOSIS — G89.4 CHRONIC PAIN SYNDROME: ICD-10-CM

## 2017-06-29 DIAGNOSIS — Z96.659 S/P KNEE REPLACEMENT: ICD-10-CM

## 2017-06-29 DIAGNOSIS — Z79.899 ENCOUNTER FOR LONG-TERM (CURRENT) USE OF HIGH-RISK MEDICATION: ICD-10-CM

## 2017-06-29 DIAGNOSIS — M17.0 PRIMARY OSTEOARTHRITIS OF BOTH KNEES: ICD-10-CM

## 2017-06-29 DIAGNOSIS — G60.3 IDIOPATHIC PROGRESSIVE NEUROPATHY: ICD-10-CM

## 2017-06-29 DIAGNOSIS — R51.9 CHRONIC INTRACTABLE HEADACHE, UNSPECIFIED HEADACHE TYPE: ICD-10-CM

## 2017-06-29 DIAGNOSIS — G89.29 CHRONIC MIDLINE THORACIC BACK PAIN: ICD-10-CM

## 2017-06-29 DIAGNOSIS — M62.830 SPASM OF BACK MUSCLES: ICD-10-CM

## 2017-06-29 DIAGNOSIS — M54.6 CHRONIC MIDLINE THORACIC BACK PAIN: ICD-10-CM

## 2017-06-29 DIAGNOSIS — M53.3 SACROILIAC JOINT PAIN: Primary | ICD-10-CM

## 2017-06-29 DIAGNOSIS — R53.82 CHRONIC FATIGUE: ICD-10-CM

## 2017-06-29 DIAGNOSIS — M47.817 LUMBOSACRAL SPONDYLOSIS WITHOUT MYELOPATHY: ICD-10-CM

## 2017-06-29 RX ORDER — FENTANYL 50 UG/1
1 PATCH TRANSDERMAL
Qty: 15 PATCH | Refills: 0 | Status: SHIPPED | OUTPATIENT
Start: 2017-07-21 | End: 2017-06-29 | Stop reason: SDUPTHER

## 2017-06-29 RX ORDER — DEXAMETHASONE SODIUM PHOSPHATE 10 MG/ML
10 INJECTION INTRAMUSCULAR; INTRAVENOUS ONCE
Qty: 1 ML | Refills: 0
Start: 2017-06-29 | End: 2017-06-29

## 2017-06-29 RX ORDER — FENTANYL 50 UG/1
1 PATCH TRANSDERMAL
Qty: 15 PATCH | Refills: 0 | Status: SHIPPED | OUTPATIENT
Start: 2017-09-17 | End: 2017-09-26 | Stop reason: SDUPTHER

## 2017-06-29 RX ORDER — FENTANYL 50 UG/1
1 PATCH TRANSDERMAL
Qty: 15 PATCH | Refills: 0 | Status: SHIPPED | OUTPATIENT
Start: 2017-08-19 | End: 2017-06-29 | Stop reason: SDUPTHER

## 2017-06-29 RX ORDER — OXYCODONE HYDROCHLORIDE 30 MG/1
30 TABLET ORAL
Qty: 90 TAB | Refills: 0 | Status: SHIPPED | OUTPATIENT
Start: 2017-06-29 | End: 2017-09-26 | Stop reason: SDUPTHER

## 2017-06-29 RX ORDER — MODAFINIL 100 MG/1
100 TABLET ORAL
Qty: 90 TAB | Refills: 1 | Status: SHIPPED | OUTPATIENT
Start: 2017-06-29 | End: 2017-09-27

## 2017-06-29 RX ORDER — LORAZEPAM 1 MG/1
1 TABLET ORAL
Qty: 90 TAB | Refills: 0 | Status: SHIPPED | OUTPATIENT
Start: 2017-06-29 | End: 2017-09-26 | Stop reason: SDUPTHER

## 2017-06-29 RX ORDER — KETOROLAC TROMETHAMINE 30 MG/ML
30 INJECTION, SOLUTION INTRAMUSCULAR; INTRAVENOUS ONCE
Qty: 1 VIAL | Refills: 0
Start: 2017-06-29 | End: 2017-06-29

## 2017-06-29 NOTE — MR AVS SNAPSHOT
Visit Information Date & Time Provider Department Dept. Phone Encounter #  
 6/29/2017 11:00 AM Andrew Mercado [address] [place] [address] Pain Management [phone] [id number] Follow-up Instructions Return in about 3 months (around 9/29/2017). Upcoming Health Maintenance Date Due Hepatitis C Screening 1945 DTaP/Tdap/Td series (1 - Tdap) 12/2/1967 BREAST CANCER SCRN MAMMOGRAM 12/2/1996 FOBT Q 1 YEAR AGE 50-75 12/2/1996 ZOSTER VACCINE AGE 60> 12/2/2006 GLAUCOMA SCREENING Q2Y 12/2/2011 OSTEOPOROSIS SCREENING (DEXA) 12/2/2011 Pneumococcal 65+ Low/Medium Risk (1 of 2 - PCV13) 12/2/2011 MEDICARE YEARLY EXAM 12/2/2011 INFLUENZA AGE 9 TO ADULT 8/1/2017 Allergies as of 6/29/2017  Review Complete On: 5/30/2017 By: Linnea Briones RN Severity Noted Reaction Type Reactions Codeine    Nausea Only Nsaids (Non-steroidal Anti-inflammatory Drug)    Diarrhea Abdominal pain  
 Sulfa (Sulfonamide Antibiotics)    Hives Current Immunizations  Never Reviewed No immunizations on file. Not reviewed this visit You Were Diagnosed With   
  
 Codes Comments Sacroiliac joint pain    -  Primary ICD-10-CM: M53.3 ICD-9-CM: 724.6 Idiopathic progressive neuropathy     ICD-10-CM: G60.3 ICD-9-CM: 399. 4 Chronic intractable headache, unspecified headache type     ICD-10-CM: R51 ICD-9-CM: 835. 0 Chronic fatigue     ICD-10-CM: R53.82 
ICD-9-CM: 780.79 Chronic pain syndrome     ICD-10-CM: G89.4 ICD-9-CM: 338. 4 Chronic midline thoracic back pain     ICD-10-CM: M54.6, G89.29 ICD-9-CM: 724.1, 338.29 S/P knee replacement     ICD-10-CM: L80.881 ICD-9-CM: V43.65 Primary osteoarthritis of both knees     ICD-10-CM: M17.0 ICD-9-CM: 715.16 Encounter for long-term (current) use of high-risk medication     ICD-10-CM: Z79.899 ICD-9-CM: V58.69 Lumbosacral spondylosis without myelopathy     ICD-10-CM: Q40.768 ICD-9-CM: 721.3 Spasm of back muscles     ICD-10-CM: M62.830 ICD-9-CM: 724.8 Vitals BP Pulse Weight(growth percentile) BMI OB Status Smoking Status 135/80 61 126 lb (57.2 kg) 24.61 kg/m2 Postmenopausal Never Smoker BMI and BSA Data Body Mass Index Body Surface Area  
 24.61 kg/m 2 1.56 m 2 Preferred Pharmacy Pharmacy Name Phone [facility] [address] [facility] [phone] Your Updated Medication List  
  
   
This list is accurate as of: 6/29/17 12:31 PM.  Always use your most recent med list.  
  
  
  
  
 aspirin 81 mg tablet Take 81 mg by mouth. CRESTOR 20 mg tablet Generic drug:  rosuvastatin Take 20 mg by mouth daily. dexamethasone (PF) 10 mg/mL injection Commonly known as:  DECADRON  
1 mL by IntraMUSCular route once for 1 dose. fentaNYL 50 mcg/hr PATCH Commonly known as:  DURAGESIC  
1 Patch by TransDERmal route every fourty-eight (48) hours. Max Daily Amount: 1 Patch. for chronic, severe, refractory pain. Mylan, Sandoz, or Mallinckrodt brands only Start taking on:  9/17/2017  
  
 gabapentin 600 mg tablet Commonly known as:  NEURONTIN Take 2 Tabs by mouth three (3) times daily. hydroCHLOROthiazide 12.5 mg tablet Commonly known as:  HYDRODIURIL Take 10 mg by mouth daily. ketorolac tromethamine 60 mg/2 mL Soln Commonly known as:  TORADOL  
1 mL by IntraMUSCular route once for 1 dose. lidocaine 5 % Commonly known as:  Elina Pillion 1 patch by TransDERmal route every twenty-four (24) hours. lisinopril 20 mg tablet Commonly known as:  Steven Camel Take 20 mg by mouth daily. LORazepam 1 mg tablet Commonly known as:  ATIVAN Take 1 Tab by mouth nightly. 90 day supply. modafinil 100 mg tablet Commonly known as:  PROVIGIL Take 1 Tab by mouth every morning for 90 days. naloxone 4 mg/actuation Spry 4 mg by Nasal route once as needed (for opioid overdose) for up to 1 dose. Indications: OPIOID TOXICITY  
  
 NEXIUM PO Take  by mouth. oxyCODONE IR 30 mg immediate release tablet Commonly known as:  OXY-IR Take 1 Tab by mouth three (3) times daily as needed for Pain for up to 30 days. Max Daily Amount: 90 mg. Due to fill 11/15/16  Indications: NEUROPATHIC PAIN, Pain  
  
 tiZANidine 4 mg tablet Commonly known as:  Wang Bliss Take 1-2 Tabs by mouth three (3) times daily. As needed for muscle spasms Prescriptions Printed Refills  
 fentaNYL (DURAGESIC) 50 mcg/hr PATCH 0 Starting on: 2017 Si Patch by TransDERmal route every fourty-eight (48) hours. Max Daily Amount: 1 Patch. for chronic, severe, refractory pain. Mylan, Sandoz, or Mallinckrodt brands only Class: Print Route: TransDERmal  
 LORazepam (ATIVAN) 1 mg tablet 0 Sig: Take 1 Tab by mouth nightly. 90 day supply. Class: Print Route: Oral  
 modafinil (PROVIGIL) 100 mg tablet 1 Sig: Take 1 Tab by mouth every morning for 90 days. Class: Print Route: Oral  
 oxyCODONE IR (OXY-IR) 30 mg immediate release tablet 0 Sig: Take 1 Tab by mouth three (3) times daily as needed for Pain for up to 30 days. Max Daily Amount: 90 mg. Due to fill 11/15/16  Indications: NEUROPATHIC PAIN, Pain Class: Print Route: Oral  
  
We Performed the Following DEXAMETHASONE SODIUM PHOSPHATE INJECTION 1 MG [ [facility]] KETOROLAC TROMETHAMINE INJ [ [facility]] MD THER/PROPH/DIAG INJECTION, SUBCUT/IM [id number] CPT(R)] MD THER/PROPH/DIAG INJECTION, SUBCUT/IM [id number] CPT(R)] Follow-up Instructions Return in about 3 months (around 2017). Patient Instructions Plan: 
Continue same medications as prescribed for chronic pain You will receive an injection of toradol 60 mg and decadron 10 mg today for acute pain Call if you would like to schedule trigger point injections for thoracic back pain Follow up in 3 months or sooner if needed Regular exercise and attention to emotional health and diet remain the most effective ways to treat chronic pain of all kinds You may contact me with questions or concerns through [address] Introducing [facility]! Maria Del Carmen Candace introduces [employer] patient portal. Now you can access parts of your medical record, email your doctor's office, and request medication refills online. 1. In your internet browser, go to [email or web address] 2. Click on the First Time User? Click Here link in the Sign In box. You will see the New Member Sign Up page. 3. Enter your [employer] Access Code exactly as it appears below. You will not need to use this code after youve completed the sign-up process. If you do not sign up before the expiration date, you must request a new code. · [employer] Access Code: [email or web address] Expires: 7/4/2017  7:11 AM 
 
4. Enter the last four digits of your Social Security Number (xxxx) and Date of Birth (mm/dd/yyyy) as indicated and click Submit. You will be taken to the next sign-up page. 5. Create a [employer] ID. This will be your [employer] login ID and cannot be changed, so think of one that is secure and easy to remember. 6. Create a [employer] password. You can change your password at any time. 7. Enter your Password Reset Question and Answer. This can be used at a later time if you forget your password. 8. Enter your e-mail address. You will receive e-mail notification when new information is available in [address] 9. Click Sign Up. You can now view and download portions of your medical record. 10. Click the Download Summary menu link to download a portable copy of your medical information. If you have questions, please visit the Frequently Asked Questions section of the [employer] website.  Remember, [employer] is NOT to be used for urgent needs. For medical emergencies, dial 911. Now available from your iPhone and Android! Please provide this summary of care documentation to your next provider. Your primary care clinician is listed as [address] [place] [address] By N. If you have any questions after today's visit, please call [phone].

## 2017-06-29 NOTE — PATIENT INSTRUCTIONS
Plan:  Continue same medications as prescribed for chronic pain  You will receive an injection of toradol 60 mg and decadron 10 mg today for acute pain  Call if you would like to schedule trigger point injections for thoracic back pain   Follow up in 3 months or sooner if needed  Regular exercise and attention to emotional health and diet remain the most effective ways to treat chronic pain of all kinds  You may contact me with questions or concerns through [facility]

## 2017-06-29 NOTE — PROGRESS NOTES
HISTORY OF PRESENT ILLNESS  Mike Mcpherson is a 80 y.o. female. Patient presents for follow up of chronic pain due to lumbar DDD and osteoarthritis. She complains of worsening muscle spasms of the upper back over the past several weeks. She reports that TPIs and massage therapy have historically been helpful for this pain. She defers scheduling injections at this time, however. She is getting ready to leave for a trip to [place] to visit her daughter. She is amenable to receiving an injection of toradol and decadron today for pain. Left sided SI pain continues to bother her often, but she does report that right sided SI pain improved dramatically after repeat RFA (L5,S1-S3) last month. Pt reports average of 5/10 pain scale most days. Knee pain has been quite bothersome of late, but she feels that her current regimen is managing these symptoms well. Pt reports average of 5-6/10 pain scale most days. Medications continue to work well for pain control overall. Mike Mcpherson is tolerating medications well, with no untoward side effects noted. She is able to stay more active with less discomfort with these current doses. The patient reports an average of 60% relief with this regimen. Most recent UDS and  were consistent with prescribed medications. Pill counts are appropriate. She is informed of side effects, risks, and benefits of this regimen, and emphasizes that she derives a significant improvement in functionality and quality of life, and notes that non-opioid medications and therapies in the past have not offered significant benefit. She denies new or worsening insomnia or constipation issues. She denies any falls, injuries, or hospitalizations since the last visit. HPI--see above    ROS  Constitutional: Positive for malaise/fatigue. HENT: Positive for neck pain. Gastrointestinal: Positive for constipation. Musculoskeletal: Positive for back pain and joint pain.    Psychiatric/Behavioral: The patient does not have insomnia. Physical Exam  Constitutional: She is oriented to person, place, and time. She appears well-developed and well-nourished. No distress. Musculoskeletal:        Right hip: She exhibits tenderness. Left hip: She exhibits tenderness. Lumbar back: She exhibits decreased range of motion, tenderness, bony tenderness, pain and spasm. Back:              Legs:            Areas of tenderness noted with red arrows  Marked spasms of cervical and lumbar paraspinous musculature noted  Well-healed incision of the anterior right knee with extension to 160 degrees, flexion to 120 degrees  Trochanteric bursae tender to palpation right side  Brisk sacroiliac tenderness noted on left side  Tenderness of inner thighs bilaterally with varicosities evident. No redness or swelling noted. No lymphadenopathy     Neurological: She is alert and oriented to person, place, and time. No cranial nerve deficit. She exhibits normal muscle tone. Coordination normal.       Neg straight leg raise   Psychiatric: She has a normal mood and affect. Her behavior is normal. Judgment and thought content normal.   ASSESSMENT and PLAN    ICD-10-CM ICD-9-CM    1. Sacroiliac joint pain M53.3 724.6    2. Idiopathic progressive neuropathy G60.3 356.4    3. Chronic intractable headache, unspecified headache type R51 784.0    4. Chronic fatigue R53.82 780.79    5. Chronic pain syndrome G89.4 338.4    6. Chronic midline thoracic back pain M54.6 724.1     G89.29 338.29    7. S/P knee replacement Z96.659 V43.65    8. Primary osteoarthritis of both knees M17.0 715.16    9. Encounter for long-term (current) use of high-risk medication Z79.899 V58.69    10.  Lumbosacral spondylosis without myelopathy M47.817 721.3    11. Spasm of back muscles M62.830 724.8       Plan:  Continue same medications as prescribed for chronic pain  You will receive an injection of toradol 60 mg and decadron 10 mg today for acute pain  Call if you would like to schedule trigger point injections for thoracic back pain   Follow up in 3 months or sooner if needed  Regular exercise and attention to emotional health and diet remain the most effective ways to treat chronic pain of all kinds  You may contact me with questions or concerns through [address]

## 2017-06-29 NOTE — PROGRESS NOTES
Nursing Notes    Patient presents to the office today in follow-up. Patient rates her pain at 5/10 on the numerical pain scale. Reviewed medications with counts as follows:    Rx Date filled Qty Dispensed Pill Count Last Dose Short   Ativan 1 mg 04/15/17 90 24 Last night no   Oxycodone 30 mg 06/06/17 90 75 today no   Fentanyl 50 mcg 06/22/17 15 13 yesterday no         Comments: patient is here for a follow up today for pain management. Her pain level is a 5 out of 10    POC UDS was performed in office today    Any new labs or imaging since last appointment? NO    Have you been to an emergency room (ER) or urgent care clinic since your last visit? NO            Have you been hospitalized since your last visit? NO     If yes, where, when, and reason for visit? Have you seen or consulted any other health care providers outside of the [address] [place] [address]  since your last visit? NO     If yes, where, when, and reason for visit? Ms. Salazar Cotton has a reminder for a \"due or due soon\" health maintenance. I have asked that she contact her primary care provider for follow-up on this health maintenance.

## 2017-09-26 ENCOUNTER — OFFICE VISIT (OUTPATIENT)
Dept: PAIN MANAGEMENT | Age: 72
End: 2017-09-26

## 2017-09-26 VITALS
DIASTOLIC BLOOD PRESSURE: 88 MMHG | SYSTOLIC BLOOD PRESSURE: 165 MMHG | HEART RATE: 69 BPM | BODY MASS INDEX: 24.61 KG/M2 | WEIGHT: 126 LBS | RESPIRATION RATE: 20 BRPM | TEMPERATURE: 98 F

## 2017-09-26 DIAGNOSIS — M47.817 LUMBOSACRAL SPONDYLOSIS WITHOUT MYELOPATHY: ICD-10-CM

## 2017-09-26 DIAGNOSIS — M53.3 SACROILIAC JOINT PAIN: ICD-10-CM

## 2017-09-26 DIAGNOSIS — G89.29 CHRONIC BILATERAL LOW BACK PAIN WITHOUT SCIATICA: ICD-10-CM

## 2017-09-26 DIAGNOSIS — M25.50 PAIN IN JOINT, MULTIPLE SITES: ICD-10-CM

## 2017-09-26 DIAGNOSIS — M54.6 CHRONIC MIDLINE THORACIC BACK PAIN: ICD-10-CM

## 2017-09-26 DIAGNOSIS — G89.4 CHRONIC PAIN SYNDROME: Primary | ICD-10-CM

## 2017-09-26 DIAGNOSIS — M18.10 DEGENERATIVE ARTHRITIS OF THUMB, UNSPECIFIED LATERALITY: ICD-10-CM

## 2017-09-26 DIAGNOSIS — Z96.659 S/P KNEE REPLACEMENT: ICD-10-CM

## 2017-09-26 DIAGNOSIS — M17.0 PRIMARY OSTEOARTHRITIS OF BOTH KNEES: ICD-10-CM

## 2017-09-26 DIAGNOSIS — M54.50 CHRONIC BILATERAL LOW BACK PAIN WITHOUT SCIATICA: ICD-10-CM

## 2017-09-26 DIAGNOSIS — G89.29 CHRONIC MIDLINE THORACIC BACK PAIN: ICD-10-CM

## 2017-09-26 RX ORDER — OXYCODONE HYDROCHLORIDE 30 MG/1
30 TABLET ORAL
Qty: 30 TAB | Refills: 0 | Status: SHIPPED | OUTPATIENT
Start: 2017-11-24 | End: 2017-12-19 | Stop reason: SDUPTHER

## 2017-09-26 RX ORDER — FENTANYL 50 UG/1
1 PATCH TRANSDERMAL
Qty: 15 PATCH | Refills: 0 | Status: SHIPPED | OUTPATIENT
Start: 2017-11-24 | End: 2017-12-19 | Stop reason: SDUPTHER

## 2017-09-26 RX ORDER — FENTANYL 50 UG/1
1 PATCH TRANSDERMAL
Qty: 15 PATCH | Refills: 0 | Status: SHIPPED | OUTPATIENT
Start: 2017-10-25 | End: 2017-09-26 | Stop reason: SDUPTHER

## 2017-09-26 RX ORDER — FENTANYL 50 UG/1
1 PATCH TRANSDERMAL
Qty: 15 PATCH | Refills: 0 | Status: SHIPPED | OUTPATIENT
Start: 2017-09-26 | End: 2017-09-26 | Stop reason: SDUPTHER

## 2017-09-26 RX ORDER — OXYCODONE HYDROCHLORIDE 30 MG/1
30 TABLET ORAL
Qty: 30 TAB | Refills: 0 | Status: SHIPPED | OUTPATIENT
Start: 2017-09-26 | End: 2017-09-26 | Stop reason: SDUPTHER

## 2017-09-26 RX ORDER — OXYCODONE HYDROCHLORIDE 30 MG/1
30 TABLET ORAL
Qty: 30 TAB | Refills: 0 | Status: SHIPPED | OUTPATIENT
Start: 2017-10-25 | End: 2017-09-26 | Stop reason: SDUPTHER

## 2017-09-26 RX ORDER — LORAZEPAM 1 MG/1
1 TABLET ORAL
Qty: 90 TAB | Refills: 0 | Status: SHIPPED | OUTPATIENT
Start: 2017-09-26 | End: 2017-12-19

## 2017-09-26 NOTE — MR AVS SNAPSHOT
Visit Information Date & Time Provider Department Dept. Phone Encounter #  
 9/26/2017  4:15 PM Unknown MD Juwan [address] [facility] [phone] Follow-up Instructions Return in about 3 months (around 12/26/2017). Upcoming Health Maintenance Date Due Hepatitis C Screening 1945 DTaP/Tdap/Td series (1 - Tdap) 12/2/1967 BREAST CANCER SCRN MAMMOGRAM 12/2/1996 FOBT Q 1 YEAR AGE 50-75 12/2/1996 ZOSTER VACCINE AGE 60> 10/2/2006 GLAUCOMA SCREENING Q2Y 12/2/2011 OSTEOPOROSIS SCREENING (DEXA) 12/2/2011 Pneumococcal 65+ Low/Medium Risk (1 of 2 - PCV13) 12/2/2011 MEDICARE YEARLY EXAM 12/2/2011 INFLUENZA AGE 9 TO ADULT 8/1/2017 Allergies as of 9/26/2017  Review Complete On: 9/26/2017 By: Jeanine Whyte LPN Severity Noted Reaction Type Reactions Codeine    Nausea Only Nsaids (Non-steroidal Anti-inflammatory Drug)    Diarrhea Abdominal pain  
 Sulfa (Sulfonamide Antibiotics)    Hives Current Immunizations  Never Reviewed No immunizations on file. Not reviewed this visit Vitals BP Pulse Temp Resp Weight(growth percentile) BMI  
 165/88 69 98 °F (36.7 °C) 20 126 lb (57.2 kg) 24.61 kg/m2 OB Status Smoking Status Postmenopausal Never Smoker Vitals History BMI and BSA Data Body Mass Index Body Surface Area  
 24.61 kg/m 2 1.56 m 2 Preferred Pharmacy Pharmacy Name Phone Carola Allison [address] [place] [address], [address] [place] [address] [phone] Your Updated Medication List  
  
   
This list is accurate as of: 9/26/17  5:34 PM.  Always use your most recent med list.  
  
  
  
  
 aspirin 81 mg tablet Take 81 mg by mouth. CRESTOR 20 mg tablet Generic drug:  rosuvastatin Take 20 mg by mouth daily. fentaNYL 50 mcg/hr PATCH Commonly known as:  Parul Ortiz  
 1 Patch by TransDERmal route every fourty-eight (48) hours. Max Daily Amount: 1 Patch. for chronic, severe, refractory pain. Mylan, Sandoz, or Mallinckrodt brands only  
  
 gabapentin 600 mg tablet Commonly known as:  NEURONTIN Take 2 Tabs by mouth three (3) times daily. hydroCHLOROthiazide 12.5 mg tablet Commonly known as:  HYDRODIURIL Take 10 mg by mouth daily. lidocaine 5 % Commonly known as:  Bob Kennel 1 patch by TransDERmal route every twenty-four (24) hours. lisinopril 20 mg tablet Commonly known as:  [place] Take 20 mg by mouth daily. LORazepam 1 mg tablet Commonly known as:  ATIVAN Take 1 Tab by mouth nightly. 90 day supply. modafinil 100 mg tablet Commonly known as:  PROVIGIL Take 1 Tab by mouth every morning for 90 days. naloxone 4 mg/actuation nasal spray Commonly known as:  NARCAN  
4 mg by Nasal route once as needed (for opioid overdose) for up to 1 dose. Indications: OPIOID TOXICITY  
  
 NEXIUM PO Take  by mouth. tiZANidine 4 mg tablet Commonly known as:  Esequiel Faith Take 1-2 Tabs by mouth three (3) times daily. As needed for muscle spasms Follow-up Instructions Return in about 3 months (around 12/26/2017). Introducing \[facility] [place] [facility]\"" & [facility] SERVICES! Laila Guardado introduces Nominum patient portal. Now you can access parts of your medical record, email your doctor's office, and request medication refills online. 1. In your internet browser, go to [email or web address] 2. Click on the First Time User? Click Here link in the Sign In box. You will see the New Member Sign Up page. 3. Enter your Nominum Access Code exactly as it appears below. You will not need to use this code after youve completed the sign-up process. If you do not sign up before the expiration date, you must request a new code. · Nominum Access Code: [email or web address] Expires: 12/25/2017  5:00 PM 
 
 4. Enter the last four digits of your Social Security Number (xxxx) and Date of Birth (mm/dd/yyyy) as indicated and click Submit. You will be taken to the next sign-up page. 5. Create a PolicyBazaar ID. This will be your PolicyBazaar login ID and cannot be changed, so think of one that is secure and easy to remember. 6. Create a PolicyBazaar password. You can change your password at any time. 7. Enter your Password Reset Question and Answer. This can be used at a later time if you forget your password. 8. Enter your e-mail address. You will receive e-mail notification when new information is available in [address] 9. Click Sign Up. You can now view and download portions of your medical record. 10. Click the Download Summary menu link to download a portable copy of your medical information. If you have questions, please visit the Frequently Asked Questions section of the PolicyBazaar website. Remember, PolicyBazaar is NOT to be used for urgent needs. For medical emergencies, dial 911. Now available from your iPhone and Android! Please provide this summary of care documentation to your next provider. Your primary care clinician is listed as Carol Barr. If you have any questions after today's visit, please call [phone].

## 2017-09-26 NOTE — PROGRESS NOTES
Nursing Notes    Patient presents to the office today in follow-up. Patient rates her pain at 5/10 on the numerical pain scale. Reviewed medications with counts as follows:    Rx Date filled Qty Dispensed Pill Count Last Dose Short     Oxycodone 30mg  0726/17 90 63 Today  No    Ativan 1mg 07/18/17 90 29 Last night  No    Fentanyl 50mcg 08/25/17 15 2 Today (09/26/17) No                             Comments:     POC UDS was not performed in office today    Any new labs or imaging since last appointment? YES; labwork and abdominal CT    Have you been to an emergency room (ER) or urgent care clinic since your last visit? NO            Have you been hospitalized since your last visit? NO     If yes, where, when, and reason for visit? Have you seen or consulted any other health care providers outside of the [address] [place] [address]  since your last visit? YES     If yes, where, when, and reason for visit? Gastroenterology, pcp     HM deferred to pcp. Patient did not bring in medications , provigil 100mg  to this office; advised to bring in all medications to all office visit.

## 2017-09-26 NOTE — PROGRESS NOTES
HISTORY OF PRESENT ILLNESS  Vimal Sanchez is a 80 y.o. female. HPI Comments: Visit survey reviewed  Chief complaint- chronic pain syndrome- neck pain, upper back pain, pain to different joints  She is usually seen every 3 months  She is accompanied by her   I have explained to the patient, in our  who is also discussed with the patient and her  today, Dr. Garry Ojeda will no longer be with the clinic moving forward. She has been seen primarily by my associates in this clinic. I have reviewed her records and medications today. I have reviewed her medications, pain and other symptoms today. The patient is aware there will be changes in transitioning. She is aware that in the future she can let us know if she would like to transition to Dr. Garry Ojeda who will be setting up, in the future, that a practice not too far away. For now the patient agrees with the changes I have discussed with her and continuing with our clinic. Fentanyl patch will remain the same, 50 mcg every 48 hour  She will continue with the usual gabapentin  Ativan 1 mg at night is to help with sleep. She does have sleep disorder and uses CPAP. I have explained to the patient that I am somewhat uneasy continue with the Ativan. For now, encouraged her to try to use one half tablet at times. She uses Roxicodone 30 mg. The prescription indicates 1, 3 times a day as needed. She states she typically only uses one a day and I have changed the prescription, once a day as needed  Dr. Garry Ojeda was also prescribing Provigil for the patient. I explained to the patient that this will no longer be coming from our clinic. I encouraged her to discuss this with her primary care physician or return back to her sleep clinic. No new significant side effects reported  Medication continues to help improve quality of life. Medications reviewed including risk and benefits.         Recent average level of pain-5    Measurement of clinical outcome for chronic pain patient/ SPAASMS Score Card-            Information reviewed and will be scanned. Total score today-6    Foot Pain     Neck Pain     Leg Pain    Associated symptoms include back pain and neck pain. Review of Systems   Constitutional: Positive for malaise/fatigue. Respiratory:        Sleep apnea; on CPAP   Gastrointestinal: Positive for heartburn (reflux). Negative for constipation. Musculoskeletal: Positive for back pain, joint pain, myalgias and neck pain. Neurological: Positive for focal weakness. Psychiatric/Behavioral: Positive for memory loss. All other systems reviewed and are negative. Physical Exam   Constitutional: She appears well-developed and well-nourished. She is cooperative. She does not have a sickly appearance. HENT:   Head: Normocephalic and atraumatic. Right Ear: External ear normal. No drainage. Left Ear: External ear normal. No drainage. Nose: Nose normal.   Eyes: Lids are normal. Right eye exhibits no discharge. Left eye exhibits no discharge. Right conjunctiva has no hemorrhage. Left conjunctiva has no hemorrhage. Neck: Neck supple. No tracheal deviation present. No thyroid mass present. Pulmonary/Chest: Effort normal. No respiratory distress. Neurological: She is alert. No cranial nerve deficit. Skin: Skin is intact. No rash noted. Psychiatric: Her speech is normal. Her affect is not angry. She does not express inappropriate judgment. Nursing note and vitals reviewed. ASSESSMENT and PLAN  Encounter Diagnoses   Name Primary?     Chronic pain syndrome Yes    Chronic midline thoracic back pain     Degenerative arthritis of thumb, unspecified laterality     Sacroiliac joint pain     S/P knee replacement     Primary osteoarthritis of both knees     Pain in joint, multiple sites     Lumbosacral spondylosis without myelopathy     Chronic bilateral low back pain without sciatica    Plans, changes, medications, as reviewed above  Continue with her regular, 3 month follow-up  Prescription monitoring program was reviewed today

## 2017-12-19 ENCOUNTER — OFFICE VISIT (OUTPATIENT)
Dept: PAIN MANAGEMENT | Age: 72
End: 2017-12-19

## 2017-12-19 VITALS
RESPIRATION RATE: 20 BRPM | SYSTOLIC BLOOD PRESSURE: 158 MMHG | HEART RATE: 73 BPM | TEMPERATURE: 97.3 F | WEIGHT: 126 LBS | DIASTOLIC BLOOD PRESSURE: 88 MMHG | BODY MASS INDEX: 24.61 KG/M2

## 2017-12-19 DIAGNOSIS — G89.29 CHRONIC MIDLINE THORACIC BACK PAIN: ICD-10-CM

## 2017-12-19 DIAGNOSIS — M54.50 CHRONIC BILATERAL LOW BACK PAIN WITHOUT SCIATICA: Primary | ICD-10-CM

## 2017-12-19 DIAGNOSIS — Z96.653 STATUS POST BILATERAL KNEE REPLACEMENTS: ICD-10-CM

## 2017-12-19 DIAGNOSIS — M25.50 PAIN IN JOINT, MULTIPLE SITES: ICD-10-CM

## 2017-12-19 DIAGNOSIS — M18.10 OSTEOARTHRITIS OF THUMB, UNSPECIFIED LATERALITY: ICD-10-CM

## 2017-12-19 DIAGNOSIS — G89.29 CHRONIC BILATERAL LOW BACK PAIN WITHOUT SCIATICA: Primary | ICD-10-CM

## 2017-12-19 DIAGNOSIS — M17.0 PRIMARY OSTEOARTHRITIS OF BOTH KNEES: ICD-10-CM

## 2017-12-19 DIAGNOSIS — M47.817 LUMBOSACRAL SPONDYLOSIS WITHOUT MYELOPATHY: ICD-10-CM

## 2017-12-19 DIAGNOSIS — G89.4 CHRONIC PAIN SYNDROME: ICD-10-CM

## 2017-12-19 DIAGNOSIS — M54.6 CHRONIC MIDLINE THORACIC BACK PAIN: ICD-10-CM

## 2017-12-19 RX ORDER — OXYCODONE HYDROCHLORIDE 30 MG/1
30 TABLET ORAL
Qty: 30 TAB | Refills: 0 | Status: SHIPPED | OUTPATIENT
Start: 2017-12-19 | End: 2018-03-20 | Stop reason: SDUPTHER

## 2017-12-19 RX ORDER — OXYCODONE HYDROCHLORIDE 30 MG/1
30 TABLET ORAL
Qty: 30 TAB | Refills: 0 | Status: SHIPPED | OUTPATIENT
Start: 2018-01-18 | End: 2018-03-20 | Stop reason: SDUPTHER

## 2017-12-19 RX ORDER — FENTANYL 50 UG/1
1 PATCH TRANSDERMAL
Qty: 15 PATCH | Refills: 0 | Status: SHIPPED | OUTPATIENT
Start: 2018-01-18 | End: 2018-03-20 | Stop reason: SDUPTHER

## 2017-12-19 RX ORDER — FENTANYL 50 UG/1
1 PATCH TRANSDERMAL
Qty: 15 PATCH | Refills: 0 | Status: SHIPPED | OUTPATIENT
Start: 2018-02-18 | End: 2018-03-20 | Stop reason: SDUPTHER

## 2017-12-19 RX ORDER — FENTANYL 50 UG/1
1 PATCH TRANSDERMAL
Qty: 15 PATCH | Refills: 0 | Status: SHIPPED | OUTPATIENT
Start: 2017-12-19 | End: 2018-08-24 | Stop reason: SDUPTHER

## 2017-12-19 RX ORDER — OXYCODONE HYDROCHLORIDE 30 MG/1
30 TABLET ORAL
Qty: 30 TAB | Refills: 0 | Status: SHIPPED | OUTPATIENT
Start: 2018-02-18 | End: 2018-03-20

## 2017-12-19 RX ORDER — LORAZEPAM 0.5 MG/1
TABLET ORAL
Qty: 45 TAB | Refills: 0 | Status: SHIPPED | OUTPATIENT
Start: 2017-12-19 | End: 2018-08-24

## 2017-12-19 NOTE — PATIENT INSTRUCTIONS

## 2017-12-19 NOTE — MR AVS SNAPSHOT
Visit Information Date & Time Provider Department Dept. Phone Encounter #  
 12/19/2017 10:45 AM Ananya Horton MD [facility] [phone] Follow-up Instructions Return in about 3 months (around 3/19/2018). Upcoming Health Maintenance Date Due Hepatitis C Screening 1945 DTaP/Tdap/Td series (1 - Tdap) 12/2/1967 FOBT Q 1 YEAR AGE 50-75 12/2/1996 ZOSTER VACCINE AGE 60> 10/2/2006 GLAUCOMA SCREENING Q2Y 12/2/2011 OSTEOPOROSIS SCREENING (DEXA) 12/2/2011 Pneumococcal 65+ Low/Medium Risk (1 of 2 - PCV13) 12/2/2011 MEDICARE YEARLY EXAM 12/2/2011 Influenza Age 5 to Adult 8/1/2017 Allergies as of 12/19/2017  Review Complete On: 12/19/2017 By: Ananya Horton MD  
  
 Severity Noted Reaction Type Reactions Codeine    Nausea Only Nsaids (Non-steroidal Anti-inflammatory Drug)    Diarrhea Abdominal pain  
 Sulfa (Sulfonamide Antibiotics)    Hives Current Immunizations  Never Reviewed No immunizations on file. Not reviewed this visit You Were Diagnosed With   
  
 Codes Comments Chronic bilateral low back pain without sciatica    -  Primary ICD-10-CM: M54.5, G89.29 ICD-9-CM: 724.2, 338.29 Lumbosacral spondylosis without myelopathy     ICD-10-CM: C37.620 ICD-9-CM: 721.3 Pain in joint, multiple sites     ICD-10-CM: M25.50 ICD-9-CM: 719.49 Primary osteoarthritis of both knees     ICD-10-CM: M17.0 ICD-9-CM: 715.16 Status post bilateral knee replacements     ICD-10-CM: W05.274 ICD-9-CM: V43.65 Osteoarthritis of thumb, unspecified laterality     ICD-10-CM: M18.10 ICD-9-CM: 715.34 Chronic pain syndrome     ICD-10-CM: G89.4 ICD-9-CM: 338. 4 Chronic midline thoracic back pain     ICD-10-CM: M54.6, G89.29 ICD-9-CM: 724.1, 338.29 Vitals BP Pulse Temp Resp Weight(growth percentile) BMI  
 158/88 73 97.3 °F (36.3 °C) 20 126 lb (57.2 kg) 24.61 kg/m2 OB Status Smoking Status Postmenopausal Never Smoker Vitals History BMI and BSA Data Body Mass Index Body Surface Area  
 24.61 kg/m 2 1.56 m 2 Preferred Pharmacy Pharmacy Name Phone Tre Duty [address], [address] [facility] [phone] Your Updated Medication List  
  
   
This list is accurate as of: 12/19/17 11:50 AM.  Always use your most recent med list.  
  
  
  
  
 aspirin 81 mg tablet Take 81 mg by mouth. CRESTOR 20 mg tablet Generic drug:  rosuvastatin Take 20 mg by mouth daily. fentaNYL 50 mcg/hr PATCH Commonly known as:  DURAGESIC  
1 Patch by TransDERmal route every fourty-eight (48) hours. Max Daily Amount: 1 Patch. for chronic, severe, refractory pain. Mylan, Sandoz, or [place]in[place]rodt brands only  
  
 gabapentin 600 mg tablet Commonly known as:  NEURONTIN Take 2 Tabs by mouth three (3) times daily. hydroCHLOROthiazide 12.5 mg tablet Commonly known as:  HYDRODIURIL Take 10 mg by mouth daily. lidocaine 5 % Commonly known as:  Jessie Host 1 patch by TransDERmal route every twenty-four (24) hours. lisinopril 20 mg tablet Commonly known as:  Mariya Shames Take 20 mg by mouth daily. LORazepam 1 mg tablet Commonly known as:  ATIVAN Take 1 Tab by mouth nightly. 90 day supply. naloxone 4 mg/actuation nasal spray Commonly known as:  NARCAN  
4 mg by Nasal route once as needed (for opioid overdose) for up to 1 dose. Indications: OPIOID TOXICITY  
  
 NEXIUM PO Take  by mouth. oxyCODONE IR 30 mg immediate release tablet Commonly known as:  Chares Betzy Take 1 Tab by mouth daily as needed for Pain for up to 30 days. Indications: NEUROPATHIC PAIN, Pain  
  
 tiZANidine 4 mg tablet Commonly known as:  [place]aca [place] Take 1-2 Tabs by mouth three (3) times daily. As needed for muscle spasms Follow-up Instructions Return in about 3 months (around 3/19/2018). Patient Instructions Preventing Falls: Care Instructions Your Care Instructions Getting around your home safely can be a challenge if you have injuries or health problems that make it easy for you to fall. Loose rugs and furniture in walkways are among the dangers for many older people who have problems walking or who have poor eyesight. People who have conditions such as arthritis, osteoporosis, or dementia also have to be careful not to fall. You can make your home safer with a few simple measures. Follow-up care is a key part of your treatment and safety. Be sure to make and go to all appointments, and call your doctor if you are having problems. It's also a good idea to know your test results and keep a list of the medicines you take. How can you care for yourself at home? Taking care of yourself · You may get dizzy if you do not drink enough water. To prevent dehydration, drink plenty of fluids, enough so that your urine is light yellow or clear like water. Choose water and other caffeine-free clear liquids. If you have kidney, heart, or liver disease and have to limit fluids, talk with your doctor before you increase the amount of fluids you drink. · Exercise regularly to improve your strength, muscle tone, and balance. Walk if you can. Swimming may be a good choice if you cannot walk easily. · Have your vision and hearing checked each year or any time you notice a change. If you have trouble seeing and hearing, you might not be able to avoid objects and could lose your balance. · Know the side effects of the medicines you take. Ask your doctor or pharmacist whether the medicines you take can affect your balance. Sleeping pills or sedatives can affect your balance. · Limit the amount of alcohol you drink. Alcohol can impair your balance and other senses.  
· Ask your doctor whether calluses or corns on your feet need to be removed. If you wear loose-fitting shoes because of calluses or corns, you can lose your balance and fall. · Talk to your doctor if you have numbness in your feet. Preventing falls at home · Remove raised doorway thresholds, throw rugs, and clutter. Repair loose carpet or raised areas in the floor. · Move furniture and electrical cords to keep them out of walking paths. · Use nonskid floor wax, and wipe up spills right away, especially on ceramic tile floors. · If you use a walker or cane, put rubber tips on it. If you use crutches, clean the bottoms of them regularly with an abrasive pad, such as steel wool. · Keep your house well lit, especially Pravin Croon, and outside walkways. Use night-lights in areas such as hallways and bathrooms. Add extra light switches or use remote switches (such as switches that go on or off when you clap your hands) to make it easier to turn lights on if you have to get up during the night. · Install sturdy handrails on stairways. · Move items in your cabinets so that the things you use a lot are on the lower shelves (about waist level). · Keep a cordless phone and a flashlight with new batteries by your bed. If possible, put a phone in each of the main rooms of your house, or carry a cell phone in case you fall and cannot reach a phone. Or, you can wear a device around your neck or wrist. You push a button that sends a signal for help. · Wear low-heeled shoes that fit well and give your feet good support. Use footwear with nonskid soles. Check the heels and soles of your shoes for wear. Repair or replace worn heels or soles. · Do not wear socks without shoes on wood floors. · Walk on the grass when the sidewalks are slippery. If you live in an area that gets snow and ice in the winter, sprinkle salt on slippery steps and sidewalks. Preventing falls in the bath · Install grab bars and nonskid mats inside and outside your shower or tub and near the toilet and sinks. · Use shower chairs and bath benches. · Use a hand-held shower head that will allow you to sit while showering. · Get into a tub or shower by putting the weaker leg in first. Get out of a tub or shower with your strong side first. 
· Repair loose toilet seats and consider installing a raised toilet seat to make getting on and off the toilet easier. · Keep your bathroom door unlocked while you are in the shower. Where can you learn more? Go to http://anna marie-manju.info/. Enter [phone] in the search box to learn more about \"Preventing Falls: Care Instructions. \" Current as of: May 12, 2017 Content Version: 11.4 © 0317-0776 [employer]. Care instructions adapted under license by logolineup (which disclaims liability or warranty for this information). If you have questions about a medical condition or this instruction, always ask your healthcare professional. [place] [address] any warranty or liability for your use of this information. Introducing [facility] & HEALTH SERVICES! Natalya Mehrdad introduces Rundown App patient portal. Now you can access parts of your medical record, email your doctor's office, and request medication refills online. 1. In your internet browser, go to [email or web address] 2. Click on the First Time User? Click Here link in the Sign In box. You will see the New Member Sign Up page. 3. Enter your Rundown App Access Code exactly as it appears below. You will not need to use this code after youve completed the sign-up process. If you do not sign up before the expiration date, you must request a new code. · Rundown App Access Code: [email or web address] Expires: 12/25/2017  4:00 PM 
 
4. Enter the last four digits of your Social Security Number (xxxx) and Date of Birth (mm/dd/yyyy) as indicated and click Submit. You will be taken to the next sign-up page. 5. Create a OfferIQ ID. This will be your OfferIQ login ID and cannot be changed, so think of one that is secure and easy to remember. 6. Create a OfferIQ password. You can change your password at any time. 7. Enter your Password Reset Question and Answer. This can be used at a later time if you forget your password. 8. Enter your e-mail address. You will receive e-mail notification when new information is available in [address] 9. Click Sign Up. You can now view and download portions of your medical record. 10. Click the Download Summary menu link to download a portable copy of your medical information. If you have questions, please visit the Frequently Asked Questions section of the OfferIQ website. Remember, OfferIQ is NOT to be used for urgent needs. For medical emergencies, dial 911. Now available from your iPhone and Android! Please provide this summary of care documentation to your next provider. Your primary care clinician is listed as Jermaine Steward. If you have any questions after today's visit, please call [phone].

## 2017-12-19 NOTE — PROGRESS NOTES
Nursing Notes    Patient presents to the office today in follow-up. Patient rates her pain at 5/10 on the numerical pain scale. Reviewed medications with counts as follows:    Rx Date filled Qty Dispensed Pill Count Last Dose Short   Oxycodone 30mg 12/03/17 30 15 Yesterday  No    Fentanyl 50mcg 12/04/17 15 10 12/17/17 No    Ativan 1mg 10/19/17 90 46 Last night  No                             Comments:     POC UDS was not performed in office today    Any new labs or imaging since last appointment? NO    Have you been to an emergency room (ER) or urgent care clinic since your last visit? NO            Have you been hospitalized since your last visit? NO     If yes, where, when, and reason for visit? Have you seen or consulted any other health care providers outside of the [address] [place] [address]  since your last visit? YES    If yes, where, when, and reason for visit? Sleep doctor       HM deferred to pcp.

## 2018-02-15 ENCOUNTER — TELEPHONE (OUTPATIENT)
Dept: PAIN MANAGEMENT | Age: 73
End: 2018-02-15

## 2018-02-15 NOTE — TELEPHONE ENCOUNTER
Fentanyl 50mcg #15/30 denied - max quantity approved is #10/30. Called pt to let her know insurance will only cover #10/30. Pt asked what to do. Told her if she can afford the other 5 she may have to pay for them. Explained she can check Good Rx as that will give her and idea of cost at the different pharmacies in the area. The only other option is to talk to the provider at the next visit about another med. Pt understood and will check Good Rx and talk to the provider at the next visit.

## 2018-03-20 ENCOUNTER — OFFICE VISIT (OUTPATIENT)
Dept: PAIN MANAGEMENT | Age: 73
End: 2018-03-20

## 2018-03-20 ENCOUNTER — TELEPHONE (OUTPATIENT)
Dept: PAIN MANAGEMENT | Age: 73
End: 2018-03-20

## 2018-03-20 VITALS
WEIGHT: 126 LBS | BODY MASS INDEX: 24.74 KG/M2 | HEART RATE: 65 BPM | DIASTOLIC BLOOD PRESSURE: 78 MMHG | HEIGHT: 60 IN | SYSTOLIC BLOOD PRESSURE: 147 MMHG | RESPIRATION RATE: 17 BRPM | TEMPERATURE: 98.6 F

## 2018-03-20 DIAGNOSIS — M47.817 LUMBOSACRAL SPONDYLOSIS WITHOUT MYELOPATHY: ICD-10-CM

## 2018-03-20 DIAGNOSIS — M25.50 PAIN IN JOINT, MULTIPLE SITES: ICD-10-CM

## 2018-03-20 DIAGNOSIS — M54.6 CHRONIC MIDLINE THORACIC BACK PAIN: ICD-10-CM

## 2018-03-20 DIAGNOSIS — G89.4 CHRONIC PAIN SYNDROME: Primary | ICD-10-CM

## 2018-03-20 DIAGNOSIS — M18.10 OSTEOARTHRITIS OF THUMB, UNSPECIFIED LATERALITY: ICD-10-CM

## 2018-03-20 DIAGNOSIS — G89.29 CHRONIC MIDLINE THORACIC BACK PAIN: ICD-10-CM

## 2018-03-20 DIAGNOSIS — Z79.899 ENCOUNTER FOR LONG-TERM (CURRENT) USE OF HIGH-RISK MEDICATION: ICD-10-CM

## 2018-03-20 DIAGNOSIS — M53.3 SACROILIAC JOINT PAIN: ICD-10-CM

## 2018-03-20 DIAGNOSIS — M62.830 SPASM OF BACK MUSCLES: ICD-10-CM

## 2018-03-20 DIAGNOSIS — G89.29 CHRONIC INTRACTABLE HEADACHE, UNSPECIFIED HEADACHE TYPE: ICD-10-CM

## 2018-03-20 DIAGNOSIS — R51.9 CHRONIC INTRACTABLE HEADACHE, UNSPECIFIED HEADACHE TYPE: ICD-10-CM

## 2018-03-20 LAB
ALCOHOL UR POC: NORMAL
AMPHETAMINES UR POC: NEGATIVE
BARBITURATES UR POC: NORMAL
BENZODIAZEPINES UR POC: NEGATIVE
BUPRENORPHINE UR POC: NEGATIVE
CANNABINOIDS UR POC: NEGATIVE
CARISOPRODOL UR POC: NORMAL
COCAINE UR POC: NEGATIVE
FENTANYL UR POC: NORMAL
MDMA/ECSTASY UR POC: NORMAL
METHADONE UR POC: NEGATIVE
METHAMPHETAMINE UR POC: NORMAL
METHYLPHENIDATE UR POC: NORMAL
OPIATES UR POC: NEGATIVE
OXYCODONE UR POC: NORMAL
PHENCYCLIDINE UR POC: NORMAL
PROPOXYPHENE UR POC: NORMAL
TRAMADOL UR POC: NORMAL
TRICYCLICS UR POC: NORMAL

## 2018-03-20 RX ORDER — FENTANYL 50 UG/1
1 PATCH TRANSDERMAL
Qty: 15 PATCH | Refills: 0 | Status: SHIPPED | OUTPATIENT
Start: 2018-05-14 | End: 2018-06-12 | Stop reason: SDUPTHER

## 2018-03-20 RX ORDER — FENTANYL 50 UG/1
1 PATCH TRANSDERMAL
Qty: 15 PATCH | Refills: 0 | Status: SHIPPED | OUTPATIENT
Start: 2018-06-12 | End: 2018-06-12 | Stop reason: SDUPTHER

## 2018-03-20 RX ORDER — OXYCODONE HYDROCHLORIDE 30 MG/1
30 TABLET ORAL
Qty: 30 TAB | Refills: 0 | Status: SHIPPED | OUTPATIENT
Start: 2018-04-15 | End: 2018-06-12 | Stop reason: SDUPTHER

## 2018-03-20 RX ORDER — FENTANYL 50 UG/1
1 PATCH TRANSDERMAL
Qty: 15 PATCH | Refills: 0 | Status: SHIPPED | OUTPATIENT
Start: 2018-04-15 | End: 2018-06-12 | Stop reason: SDUPTHER

## 2018-03-20 RX ORDER — OXYCODONE HYDROCHLORIDE 30 MG/1
30 TABLET ORAL
Qty: 30 TAB | Refills: 0 | Status: SHIPPED | OUTPATIENT
Start: 2018-05-15 | End: 2018-06-12 | Stop reason: SDUPTHER

## 2018-03-20 RX ORDER — OXYCODONE HYDROCHLORIDE 30 MG/1
30 TABLET ORAL
Qty: 30 TAB | Refills: 0 | Status: SHIPPED | OUTPATIENT
Start: 2018-06-12 | End: 2018-07-12

## 2018-03-20 NOTE — TELEPHONE ENCOUNTER
Fentanyl 50mcg #15/30 was previously denied for quantity - [employer] will only cover #10/30. Pt was notified of this in Feb when quantity was denied - explained she can pay out of pocket for the other 5 if she can afford it.

## 2018-03-20 NOTE — PROGRESS NOTES
Nursing Notes    Patient presents to the office today in follow-up. Patient rates her pain at 3/10 on the numerical pain scale. Reviewed medications with counts as follows:    Rx Date filled Qty Dispensed Pill Count Last Dose Short   Oxycodone 30 mg 03/17/18 30 28 yesterday no   Fentanyl 50 mcg 03/17/18 10 10+1on today no   Lorazepam 0.5 mg 02/18/18 45 45 Last night no                        POC UDS was performed in office today    Any new labs or imaging since last appointment? YES, x-ray    Have you been to an emergency room (ER) or urgent care clinic since your last visit? YES, Urgent care            Have you been hospitalized since your last visit? NO     If yes, where, when, and reason for visit? Have you seen or consulted any other health care providers outside of the [address] [place] [address]  since your last visit? YES, PCP     If yes, where, when, and reason for visit? HM deferred to pcp.

## 2018-03-20 NOTE — PROGRESS NOTES
HISTORY OF PRESENT ILLNESS  Geremias Augustin is a 71 y.o. female    Ms. Reed Brito presents for follow up of chronic pain due to lumbar DDD and osteoarthritis. This is my first visit with this patient. Patient reports that two weeks ago her foot caught on the road and didn't clear the pavement causing her to fall forward. She reports that she broke her left 5th finger when she tried to break her fall with her hand. She was seen by urgent care and was given a half brace. She is not wearing the brace today. She also reports that she is having new neck pain. She is going to physical therapy for her neck pain. She reports that she continues to have her sacroiliac and lumbar pain. This is unchanged since last visit. We discussed her current condition and medications in detail today. She tolerates medications without side effects. Geremias Augustin reports no change in sleep or constipation. Patient uses over-the-counter medication for occasional constipation    Current medication management consists of: fentanyl 50 mcg every 48 hour, Oxycodone 30 mg, once a day as needed, She will continue with her usual tizanidine and gabapentin. Patient currently tapering off of Ativan. Medications are helping with pain control and quality of life. Her pain is 3-4/10 with medication and 5-6/10 without. Pt describes pain as aching, stabbing, and burning. Aggravating factors include standing, sitting, and walking. Relieved with rest, medication, and avoiding painful activities. The patient reports 70% relief with current medications. Current treatment is helping to improve general activity, mood, walking, sleep, enjoyment of life    Measuring clinical outcomes of chronic pain patients: score 8/28; the lower the number the better the outcome. Because the patient's current regimen places him/her at increased risk for possible overdose, a prescription for naloxone nasal spray is being provided.   The patient understands that this medication is only to be used in the setting of a possible overdose and that inadvertent use of this medication could precipitate overt withdrawal.  -I discussed Narcan with the patient today. In addition the patient has received Narcan instruction sheet. Pain Meds and Quality Of Life have been reviewed. Nonpharmacologic therapy and non-opioid pharmacologic therapy were considered. If opioid therapy is prescribed, this is only if the expected benefits are anticipated to outweigh risks. She  is otherwise doing well with no other complaints today. She denies any adverse events including nausea, vomiting, dizziness, increased constipation, hallucinations, or seizures. The patient reports functional improvement and QOL with pain medication. Vitals:    03/20/18 1101   BP: 147/78   Pulse: 65   Resp: 17   Temp: 98.6 °F (37 °C)   TempSrc: Oral   Weight: 57.2 kg (126 lb)   Height: 5' (1.524 m)   PainSc:   3   PainLoc: Neck         Allergies   Allergen Reactions    Codeine Nausea Only    Nsaids (Non-Steroidal Anti-Inflammatory Drug) Diarrhea     Abdominal pain    Sulfa (Sulfonamide Antibiotics) Hives       Past Surgical History:   Procedure Laterality Date    HX APPENDECTOMY      HX CHOLECYSTECTOMY      HX GYN      uterine surgery    HX KNEE REPLACEMENT         ROS   Constitutional: Positive for malaise/fatigue. Respiratory:        Sleep apnea; on CPAP   Gastrointestinal: Positive for heartburn (reflux). Negative for constipation. Musculoskeletal: Positive for back pain, joint pain, myalgias and neck pain. Neurological: Positive for focal weakness. Psychiatric/Behavioral: Positive for memory loss. Negative for suicidal ideas. All other systems reviewed and are negative    Physical Exam   Constitutional: She appears well-developed and well-nourished. She is cooperative. She does not have a sickly appearance. HENT:   Head: Normocephalic and atraumatic.    Right Ear: External ear normal. No drainage. Left Ear: External ear normal. No drainage. Nose: Nose normal.   Eyes: Lids are normal. Right eye exhibits no discharge. Left eye exhibits no discharge. Right conjunctiva has no hemorrhage. Left conjunctiva has no hemorrhage. Neck: Neck supple. No tracheal deviation present. No thyroid mass present. Pulmonary/Chest: Effort normal. No respiratory distress. Neurological: She is alert. No cranial nerve deficit. Skin: Skin is intact. No rash noted. Psychiatric: Her speech is normal. Her affect is not angry. She does not express inappropriate judgment. Nursing note and vitals reviewed. ASSESSMENT:    1. Chronic pain syndrome    2. Pain in joint, multiple sites    3. Sacroiliac joint pain    4. Chronic midline thoracic back pain    5. Chronic intractable headache, unspecified headache type    6. Osteoarthritis of thumb, unspecified laterality    7. Lumbosacral spondylosis without myelopathy    8. Spasm of back muscles    9. Encounter for long-term (current) use of high-risk medication        [place] Prescription Monitoring Program was reviewed which does not demonstrate aberrancies and/or inconsistencies with regard to the historical prescribing of controlled medications to this patient by other providers. Medications were brought to visit today. Pill count was appropriate. When possible, non-drug therapy for chronic pain should be used as a first-line treatment. Physical therapy exercise regimens, chiropractic manipulation, meditation relaxation techniques, cognitive behavior therapy, acupuncture, yoga, Efrain Chi,  transcutaneous electrical nerve stimulation (TENS), and application of moist heat can help alleviate pain .      Explained that realistic expectations and goals with chronic pain management are to maximize function and minimize pain with the understanding that limitations will exist both in the extent of relief that she may achieve, as well as thresholds of mg strengths that we will not exceed. Our role is to help the patient better cope with chronic pain utilizng a multimodal approach. The patients condition and plan were discussed. All questions were answered. The patient agrees with the plan. PLAN / Pt Instructions:  1. Continue current plan with no evidence of addiction or diversion. 2. Stable on current medication without adverse events. 3. Refill fentanyl 50 mcg patch, 1 patch every 48 hours  4. Refill oxycodone IR 30 mg tablets, take 1 tablet daily as needed  5. Discussed risks of addiction, dependency, and opioid induced hyperalgesia. 6. Reviewed with patient benefits of home exercise, and lifestyle changes to assist the patient in self-management of symptoms. 7. Return to clinic in 3 months       Prescription monitoring program reviewed. POC UDS today. Pain medications prescribed with the objective of pain relief and improved physical and psychosocial function in this patient. DISPOSITION  · Counseled patient on proper use of prescribed medications. · Counseled patient about chronic medical conditions and their relationship to anxiety and depression and recommended mental health support as needed. · Reviewed with patient self-help tools, home exercise, and lifestyle changes to assist the patient in self-management of symptoms. · Reviewed with patient the treatment plan, goals of treatment plan, and limitations of treatment plan, to include the potential for side effects from medications and procedures. If side effects occur, it is the responsibility of the patient to inform the clinic so that a change in the treatment plan can be made in a safe manner. The patient is advised that stopping prescribed medication may cause an increase in symptoms and possible medication withdrawal symptoms. The patient is informed an emergency room evaluation may be necessary if this occurs.         Spent 25 minutes with patient today reviewing the treatment plan, goals of treatment plan, and limitations of the treatment plan, to include the potential for side effects from medications and procedures. More than 50% of the visit time was spent counseling the patient. Lida Parikh PA-C 3/20/2018        Note: Please excuse any typographical errors. Voice recognition software was used for this note and may cause mistakes.

## 2018-03-20 NOTE — MR AVS SNAPSHOT
[address] [facility] [address] 
[phone] Patient: Raymon Davis MRN: [id number] :1945 Visit Information Date & Time Provider Department Dept. Phone Encounter #  
 3/20/2018 10:40 AM [facility] [phone] [id number] Upcoming Health Maintenance Date Due Hepatitis C Screening 1945 DTaP/Tdap/Td series (1 - Tdap) 1967 BREAST CANCER SCRN MAMMOGRAM 1996 FOBT Q 1 YEAR AGE 50-75 1996 ZOSTER VACCINE AGE 60> 10/2/2006 GLAUCOMA SCREENING Q2Y 2011 Bone Densitometry (Dexa) Screening 2011 Pneumococcal 65+ Low/Medium Risk (1 of 2 - PCV13) 2011 Influenza Age 5 to Adult 2017 MEDICARE YEARLY EXAM 3/14/2018 Allergies as of 3/20/2018  Review Complete On: 3/20/2018 By: Alva Garcia PA-C Severity Noted Reaction Type Reactions Codeine    Nausea Only Nsaids (Non-steroidal Anti-inflammatory Drug)    Diarrhea Abdominal pain  
 Sulfa (Sulfonamide Antibiotics)    Hives Current Immunizations  Never Reviewed No immunizations on file. Not reviewed this visit You Were Diagnosed With   
  
 Codes Comments Chronic pain syndrome    -  Primary ICD-10-CM: G89.4 ICD-9-CM: 338.4 Pain in joint, multiple sites     ICD-10-CM: M25.50 ICD-9-CM: 719.49 Sacroiliac joint pain     ICD-10-CM: M53.3 ICD-9-CM: 724.6 Chronic midline thoracic back pain     ICD-10-CM: M54.6, G89.29 ICD-9-CM: 724.1, 338.29 Chronic intractable headache, unspecified headache type     ICD-10-CM: R51 ICD-9-CM: 784.0 Osteoarthritis of thumb, unspecified laterality     ICD-10-CM: M18.10 ICD-9-CM: 715.34 Lumbosacral spondylosis without myelopathy     ICD-10-CM: P08.137 ICD-9-CM: 721.3 Spasm of back muscles     ICD-10-CM: M62.830 ICD-9-CM: 724.8 Encounter for long-term (current) use of high-risk medication     ICD-10-CM: Z79.899 ICD-9-CM: V58.69 Vitals BP Pulse Temp Resp Height(growth percentile) Weight(growth percentile) 147/78 (BP 1 Location: Right arm, BP Patient Position: Sitting) 65 98.6 °F (37 °C) (Oral) 17 5' (1.524 m) 126 lb (57.2 kg) BMI OB Status Smoking Status 24.61 kg/m2 Postmenopausal Never Smoker Vitals History BMI and BSA Data Body Mass Index Body Surface Area  
 24.61 kg/m 2 1.56 m 2 Preferred Pharmacy Pharmacy Name [facility] [address] [facility] [phone] Your Updated Medication List  
  
   
This list is accurate as of 3/20/18 11:54 AM.  Always use your most recent med list.  
  
  
  
  
 aspirin 81 mg tablet Take 81 mg by mouth. CRESTOR 20 mg tablet Generic drug:  rosuvastatin Take 20 mg by mouth daily. * fentaNYL 50 mcg/hr PATCH Commonly known as:  DURAGESIC  
1 Patch by TransDERmal route every fourty-eight (48) hours. Max Daily Amount: 1 Patch. for chronic, severe, refractory pain. Mylan, Sandoz, or Mallinckrodt brands only * fentaNYL 50 mcg/hr PATCH Commonly known as:  DURAGESIC  
1 Patch by TransDERmal route every fourty-eight (48) hours. Max Daily Amount: 1 Patch. for chronic, severe, refractory pain. Mylan, Sandoz, or Mallinckrodt brands only Start taking on:  4/15/2018 * fentaNYL 50 mcg/hr PATCH Commonly known as:  DURAGESIC  
1 Patch by TransDERmal route every fourty-eight (48) hours. Max Daily Amount: 1 Patch. for chronic, severe, refractory pain. Mylan, Sandoz, or Mallinckrodt brands only Start taking on:  5/14/2018 * fentaNYL 50 mcg/hr PATCH Commonly known as:  DURAGESIC  
1 Patch by TransDERmal route every fourty-eight (48) hours. Max Daily Amount: 1 Patch. for chronic, severe, refractory pain. Mylan, Sandoz, or Mallinckrodt brands only Start taking on:  6/12/2018  
  
 gabapentin 600 mg tablet Commonly known as:  NEURONTIN Take 2 Tabs by mouth three (3) times daily. hydroCHLOROthiazide 12.5 mg tablet Commonly known as:  HYDRODIURIL Take 10 mg by mouth daily. lidocaine 5 % Commonly known as:  Tricia Amend 1 patch by TransDERmal route every twenty-four (24) hours. lisinopril 20 mg tablet Commonly known as:  [place] Take 20 mg by mouth daily. LORazepam 0.5 mg tablet Commonly known as:  ATIVAN  
1/2 tab  qhs  Prn         90 day supply  Indications: Insomnia, Muscle Spasm  
  
 naloxone 4 mg/actuation nasal spray Commonly known as:  NARCAN  
4 mg by Nasal route once as needed (for opioid overdose) for up to 1 dose. Indications: OPIOID TOXICITY  
  
 NEXIUM PO Take  by mouth. * oxyCODONE IR 30 mg immediate release tablet Commonly known as:  Renuka Curry Take 1 Tab by mouth daily as needed for Pain for up to 30 days. Indications: NEUROPATHIC PAIN, Pain * oxyCODONE IR 30 mg immediate release tablet Commonly known as:  Renuka Curry Take 1 Tab by mouth daily as needed for Pain for up to 30 days. Indications: NEUROPATHIC PAIN, Pain Start taking on:  4/15/2018 * oxyCODONE IR 30 mg immediate release tablet Commonly known as:  Betitojong Patricio Take 1 Tab by mouth daily as needed for Pain for up to 30 days. Indications: NEUROPATHIC PAIN, Pain Start taking on:  5/15/2018 * oxyCODONE IR 30 mg immediate release tablet Commonly known as:  Betitojong Patricio Take 1 Tab by mouth daily as needed for Pain for up to 30 days. Indications: NEUROPATHIC PAIN, Pain Start taking on:  6/12/2018  
  
 tiZANidine 4 mg tablet Commonly known as:  Jenny Mar Take 1-2 Tabs by mouth three (3) times daily. As needed for muscle spasms * Notice: This list has 8 medication(s) that are the same as other medications prescribed for you.  Read the directions carefully, and ask your doctor or other care provider to review them with you. Prescriptions Printed Refills  
 fentaNYL (DURAGESIC) 50 mcg/hr PATCH 0 Starting on: 2018 Si Patch by TransDERmal route every fourty-eight (48) hours. Max Daily Amount: 1 Patch. for chronic, severe, refractory pain. Mylan, Sandoz, or Mallinckrodt brands only Class: Print Route: TransDERmal  
 fentaNYL (DURAGESIC) 50 mcg/hr PATCH 0 Starting on: 2018 Si Patch by TransDERmal route every fourty-eight (48) hours. Max Daily Amount: 1 Patch. for chronic, severe, refractory pain. Mylan, Sandoz, or [place]in[place]rodt brands only Class: Print Route: TransDERmal  
 fentaNYL (DURAGESIC) 50 mcg/hr PATCH 0 Starting on: 4/15/2018 Si Patch by TransDERmal route every fourty-eight (48) hours. Max Daily Amount: 1 Patch. for chronic, severe, refractory pain. Mylan, Sandoz, or Mallinckrodt brands only Class: Print Route: TransDERmal  
 oxyCODONE IR (ROXICODONE) 30 mg immediate release tablet 0 Starting on: 2018 Sig: Take 1 Tab by mouth daily as needed for Pain for up to 30 days. Indications: NEUROPATHIC PAIN, Pain Class: Print Route: Oral  
 oxyCODONE IR (ROXICODONE) 30 mg immediate release tablet 0 Starting on: 5/15/2018 Sig: Take 1 Tab by mouth daily as needed for Pain for up to 30 days. Indications: NEUROPATHIC PAIN, Pain Class: Print Route: Oral  
 oxyCODONE IR (ROXICODONE) 30 mg immediate release tablet 0 Starting on: 4/15/2018 Sig: Take 1 Tab by mouth daily as needed for Pain for up to 30 days. Indications: NEUROPATHIC PAIN, Pain Class: Print Route: Oral  
  
We Performed the Following AMB POC DRUG SCREEN () [ HCP] DRUG SCREEN [[id number] Custom] Introducing [facility]! Kasie Prince introduces [employer] patient portal. Now you can access parts of your medical record, email your doctor's office, and request medication refills online. 1. In your internet browser, go to [email or web address] 2. Click on the First Time User? Click Here link in the Sign In box. You will see the New Member Sign Up page. 3. Enter your [employer]Brand Access Code exactly as it appears below. You will not need to use this code after youve completed the sign-up process. If you do not sign up before the expiration date, you must request a new code. · [employer] Access Code: [email or web address] Expires: 6/18/2018 11:54 AM 
 
4. Enter the last four digits of your Social Security Number (xxxx) and Date of Birth (mm/dd/yyyy) as indicated and click Submit. You will be taken to the next sign-up page. 5. Create a Innometricst ID. This will be your vBrand login ID and cannot be changed, so think of one that is secure and easy to remember. 6. Create a vBrand password. You can change your password at any time. 7. Enter your Password Reset Question and Answer. This can be used at a later time if you forget your password. 8. Enter your e-mail address. You will receive e-mail notification when new information is available in [address] 9. Click Sign Up. You can now view and download portions of your medical record. 10. Click the Download Summary menu link to download a portable copy of your medical information. If you have questions, please visit the Frequently Asked Questions section of the v[employer] website. Remember, vBrand is NOT to be used for urgent needs. For medical emergencies, dial 911. Now available from your iPhone and Android! Please provide this summary of care documentation to your next provider. Your primary care clinician is listed as Jamie Guerra. If you have any questions after today's visit, please call [phone].

## 2018-06-12 ENCOUNTER — OFFICE VISIT (OUTPATIENT)
Dept: PAIN MANAGEMENT | Age: 73
End: 2018-06-12

## 2018-06-12 VITALS
BODY MASS INDEX: 24.35 KG/M2 | HEART RATE: 66 BPM | TEMPERATURE: 97.5 F | DIASTOLIC BLOOD PRESSURE: 78 MMHG | WEIGHT: 124 LBS | RESPIRATION RATE: 14 BRPM | SYSTOLIC BLOOD PRESSURE: 178 MMHG | HEIGHT: 60 IN

## 2018-06-12 DIAGNOSIS — M54.6 CHRONIC MIDLINE THORACIC BACK PAIN: ICD-10-CM

## 2018-06-12 DIAGNOSIS — M17.0 PRIMARY OSTEOARTHRITIS OF BOTH KNEES: ICD-10-CM

## 2018-06-12 DIAGNOSIS — M54.50 CHRONIC BILATERAL LOW BACK PAIN WITHOUT SCIATICA: ICD-10-CM

## 2018-06-12 DIAGNOSIS — M47.817 LUMBOSACRAL SPONDYLOSIS WITHOUT MYELOPATHY: ICD-10-CM

## 2018-06-12 DIAGNOSIS — G89.4 CHRONIC PAIN SYNDROME: Primary | ICD-10-CM

## 2018-06-12 DIAGNOSIS — G56.21 ULNAR NEUROPATHY OF RIGHT UPPER EXTREMITY: ICD-10-CM

## 2018-06-12 DIAGNOSIS — M19.079 ARTHRITIS OF FOOT: ICD-10-CM

## 2018-06-12 DIAGNOSIS — G89.29 CHRONIC MIDLINE THORACIC BACK PAIN: ICD-10-CM

## 2018-06-12 DIAGNOSIS — M53.3 SACROILIAC JOINT PAIN: ICD-10-CM

## 2018-06-12 DIAGNOSIS — G60.3 IDIOPATHIC PROGRESSIVE NEUROPATHY: ICD-10-CM

## 2018-06-12 DIAGNOSIS — M62.830 SPASM OF BACK MUSCLES: ICD-10-CM

## 2018-06-12 DIAGNOSIS — M54.81 OCCIPITAL NEURALGIA, UNSPECIFIED LATERALITY: ICD-10-CM

## 2018-06-12 DIAGNOSIS — G89.29 CHRONIC BILATERAL LOW BACK PAIN WITHOUT SCIATICA: ICD-10-CM

## 2018-06-12 DIAGNOSIS — M25.50 PAIN IN JOINT, MULTIPLE SITES: ICD-10-CM

## 2018-06-12 RX ORDER — OXYCODONE HYDROCHLORIDE 30 MG/1
30 TABLET ORAL
Qty: 25 TAB | Refills: 0 | Status: SHIPPED | OUTPATIENT
Start: 2018-07-18 | End: 2018-08-24 | Stop reason: SDUPTHER

## 2018-06-12 RX ORDER — FENTANYL 50 UG/1
1 PATCH TRANSDERMAL
Qty: 15 PATCH | Refills: 0 | Status: SHIPPED | OUTPATIENT
Start: 2018-07-18 | End: 2018-09-18 | Stop reason: ALTCHOICE

## 2018-06-12 NOTE — PROGRESS NOTES
Nursing Notes    Patient presents to the office today in follow-up. Patient rates her pain at 5/10 on the numerical pain scale. Reviewed medications with counts as follows:    Rx Date filled Qty Dispensed Pill Count Last Dose Short   roxicodone 30 mg 05/20/18 30 18 +1 Rx 2 today ago no   Fentanyl 50mcg 05/20/18 10 2 +1 Rx yesterday no                                  Comments:     POC UDS was not performed in office today    Any new labs or imaging since last appointment? YES Xray hand post fall    Have you been to an emergency room (ER) or urgent care clinic since your last visit? YES left hand Fx           Have you been hospitalized since your last visit? NO     If yes, where, when, and reason for visit? Have you seen or consulted any other health care providers outside of the [address] [place] [address]  since your last visit? NO     If yes, where, when, and reason for visit? Ms. Rand Mcgrath has a reminder for a \"due or due soon\" health maintenance. I have asked that she contact her primary care provider for follow-up on this health maintenance.

## 2018-06-12 NOTE — PROGRESS NOTES
HISTORY OF PRESENT ILLNESS  Edil Epps is a 71 y.o. female    Ms. Emilie Beal presents for follow up of chronic pain due to lumbar DDD and osteoarthritis.      She denies any significant changes in her chronic pain since last visit. She reports that she continues to have her sacroiliac and lumbar pain. We discussed her current condition and medications in detail today. She tolerates medications without side effects. Alma Beal reports no change in sleep or constipation. Patient uses over-the-counter medication for occasional constipation. Patient understands current practice transition and taper plan in future. Patient is planning on transferring her continued pain management care to another practice. She will sign a medical release form today. I will provide 1 month transition prescription. We will assist patient with any medical records transfer as needed. Current MME dose as of today:225     Current medication management consists of: fentanyl 50 mcg every 48 hour, Oxycodone 30 mg, once a day as needed, She will continue with her usual tizanidine and gabapentin.  Patient currently tapering off of Ativan.      Medications are helping with pain control and quality of life. Her pain is 3-4/10 with medication and 5-6/10 without.  Pt describes pain as aching, stabbing, and burning. Aggravating factors include standing, sitting, and walking. Relieved with rest, medication, and avoiding painful activities. The patient reports 70% relief with current medications.   Current treatment is helping to improve general activity, mood, walking, sleep, enjoyment of life     Pain Meds and Quality Of Life have been reviewed. Nonpharmacologic therapy and non-opioid pharmacologic therapy were considered. If opioid therapy is prescribed, this is only if the expected benefits are anticipated to outweigh risks. She  is otherwise doing well with no other complaints today.  She denies any adverse events including nausea, vomiting, dizziness, increased constipation, hallucinations, or seizures. The patient reports functional improvement and QOL with pain medication. Vitals:    06/12/18 1139   BP: 178/78   Pulse: 66   Resp: 14   Temp: 97.5 °F (36.4 °C)   TempSrc: Oral   Weight: 56.2 kg (124 lb)   Height: 5' (1.524 m)   PainSc:   5   PainLoc: Back         Allergies   Allergen Reactions    Codeine Nausea Only    Nsaids (Non-Steroidal Anti-Inflammatory Drug) Diarrhea     Abdominal pain    Sulfa (Sulfonamide Antibiotics) Hives       Past Surgical History:   Procedure Laterality Date    HX APPENDECTOMY      HX CHOLECYSTECTOMY      HX GYN      uterine surgery    HX KNEE REPLACEMENT         ROS   Constitutional: Positive for malaise/fatigue. Respiratory:        Sleep apnea; on CPAP   Gastrointestinal: Positive for heartburn (reflux). Negative for constipation. Musculoskeletal: Positive for back pain, joint pain, myalgias and neck pain. Neurological: Positive for focal weakness. Psychiatric/Behavioral: Positive for memory loss. Negative for suicidal ideas. All other systems reviewed and are negative    Physical Exam   Constitutional: She appears well-developed and well-nourished. She is cooperative. She does not have a sickly appearance. HENT:   Head: Normocephalic and atraumatic. Right Ear: External ear normal. No drainage. Left Ear: External ear normal. No drainage. Nose: Nose normal.   Eyes: Lids are normal. Right eye exhibits no discharge. Left eye exhibits no discharge. Right conjunctiva has no hemorrhage. Left conjunctiva has no hemorrhage. Neck: Neck supple. No tracheal deviation present. No thyroid mass present. Pulmonary/Chest: Effort normal. No respiratory distress. Neurological: She is alert. No cranial nerve deficit. Skin: Skin is intact. No rash noted. Psychiatric: Her speech is normal. Her affect is not angry. She does not express inappropriate judgment.    Nursing note and vitals reviewed. ASSESSMENT:    1. Chronic pain syndrome    2. Arthritis of foot    3. Sacroiliac joint pain    4. Primary osteoarthritis of both knees    5. Occipital neuralgia, unspecified laterality    6. Lumbosacral spondylosis without myelopathy    7. Ulnar neuropathy of right upper extremity    8. Idiopathic progressive neuropathy    9. Chronic bilateral low back pain without sciatica    10. Pain in joint, multiple sites    11. Chronic midline thoracic back pain    12. Spasm of back muscles          COMM:   2    [place] Prescription Monitoring Program was reviewed which does not demonstrate aberrancies and/or inconsistencies with regard to the historical prescribing of controlled medications to this patient by other providers. Medications were brought to visit today. Pill count was appropriate. When possible, non-drug therapy for chronic pain should be used as a first-line treatment. Physical therapy exercise regimens, chiropractic manipulation, meditation relaxation techniques, cognitive behavior therapy, acupuncture, yoga, Efrain Chi,  transcutaneous electrical nerve stimulation (TENS), and application of moist heat can help alleviate pain . Explained that realistic expectations and goals with chronic pain management are to maximize function and minimize pain with the understanding that limitations will exist both in the extent of relief that she may achieve, as well as thresholds of mg strengths that we will not exceed. Our role is to help the patient better cope with chronic pain utilizng a multimodal approach. The patients condition and plan were discussed. All questions were answered. The patient agrees with the plan. PLAN / Pt Instructions:  1. Continue current plan with no evidence of addiction or diversion. 2. Stable on current medication without adverse events. 3. Refill fentanyl 50 mcg patch, 1 patch every 48 hours  4.  Refill decreased down to oxycodone IR 30 mg tablets, take 1 tablet daily as needed  5. Discussed risks of addiction, dependency, and opioid induced hyperalgesia. 6. Reviewed with patient benefits of home exercise, and lifestyle changes to assist the patient in self-management of symptoms. 7. Patient plans on moving her pain management care to another provider. 8. She will tentatively schedule for 3 month follow-up. We will assist with medical records transfer as needed. Prescription monitoring program reviewed. Pain medications prescribed with the objective of pain relief and improved physical and psychosocial function in this patient. DISPOSITION  · Counseled patient on proper use of prescribed medications. · Counseled patient about chronic medical conditions and their relationship to anxiety and depression and recommended mental health support as needed. · Reviewed with patient self-help tools, home exercise, and lifestyle changes to assist the patient in self-management of symptoms. · Reviewed with patient the treatment plan, goals of treatment plan, and limitations of treatment plan, to include the potential for side effects from medications and procedures. If side effects occur, it is the responsibility of the patient to inform the clinic so that a change in the treatment plan can be made in a safe manner. The patient is advised that stopping prescribed medication may cause an increase in symptoms and possible medication withdrawal symptoms. The patient is informed an emergency room evaluation may be necessary if this occurs. Spent 25 minutes with patient today reviewing the treatment plan, goals of treatment plan, and limitations of the treatment plan, to include the potential for side effects from medications and procedures. More than 50% of the visit time was spent counseling the patient. Lina Lozoya PA-C 6/12/2018        Note: Please excuse any typographical errors.  Voice recognition software was used for this note and may cause mistakes.

## 2018-08-24 ENCOUNTER — OFFICE VISIT (OUTPATIENT)
Dept: PAIN MANAGEMENT | Age: 73
End: 2018-08-24

## 2018-08-24 VITALS
RESPIRATION RATE: 22 BRPM | SYSTOLIC BLOOD PRESSURE: 194 MMHG | WEIGHT: 124 LBS | DIASTOLIC BLOOD PRESSURE: 97 MMHG | BODY MASS INDEX: 23.41 KG/M2 | HEART RATE: 67 BPM | HEIGHT: 61 IN | TEMPERATURE: 95.6 F

## 2018-08-24 DIAGNOSIS — M76.891 ENTHESOPATHY OF HIP REGION ON BOTH SIDES: ICD-10-CM

## 2018-08-24 DIAGNOSIS — M76.892 ENTHESOPATHY OF HIP REGION ON BOTH SIDES: ICD-10-CM

## 2018-08-24 DIAGNOSIS — M62.830 SPASM OF BACK MUSCLES: ICD-10-CM

## 2018-08-24 DIAGNOSIS — M54.6 CHRONIC MIDLINE THORACIC BACK PAIN: ICD-10-CM

## 2018-08-24 DIAGNOSIS — G89.4 CHRONIC PAIN SYNDROME: Primary | ICD-10-CM

## 2018-08-24 DIAGNOSIS — M46.1 SACROILIITIS (HCC): ICD-10-CM

## 2018-08-24 DIAGNOSIS — M47.817 LUMBOSACRAL SPONDYLOSIS WITHOUT MYELOPATHY: ICD-10-CM

## 2018-08-24 DIAGNOSIS — Z79.899 ENCOUNTER FOR LONG-TERM (CURRENT) USE OF HIGH-RISK MEDICATION: ICD-10-CM

## 2018-08-24 DIAGNOSIS — G89.29 CHRONIC MIDLINE THORACIC BACK PAIN: ICD-10-CM

## 2018-08-24 DIAGNOSIS — G89.29 CHRONIC BILATERAL LOW BACK PAIN WITHOUT SCIATICA: ICD-10-CM

## 2018-08-24 DIAGNOSIS — Z96.653 STATUS POST BILATERAL KNEE REPLACEMENTS: ICD-10-CM

## 2018-08-24 DIAGNOSIS — M54.50 CHRONIC BILATERAL LOW BACK PAIN WITHOUT SCIATICA: ICD-10-CM

## 2018-08-24 DIAGNOSIS — M53.3 SACROILIAC JOINT PAIN: ICD-10-CM

## 2018-08-24 DIAGNOSIS — M17.0 PRIMARY OSTEOARTHRITIS OF BOTH KNEES: ICD-10-CM

## 2018-08-24 DIAGNOSIS — M51.36 DDD (DEGENERATIVE DISC DISEASE), LUMBAR: ICD-10-CM

## 2018-08-24 DIAGNOSIS — M25.50 PAIN IN JOINT, MULTIPLE SITES: ICD-10-CM

## 2018-08-24 LAB
ALCOHOL UR POC: NORMAL
AMPHETAMINES UR POC: NEGATIVE
BARBITURATES UR POC: NEGATIVE
BENZODIAZEPINES UR POC: NEGATIVE
BUPRENORPHINE UR POC: NEGATIVE
CANNABINOIDS UR POC: NEGATIVE
CARISOPRODOL UR POC: NORMAL
COCAINE UR POC: NEGATIVE
FENTANYL UR POC: NORMAL
MDMA/ECSTASY UR POC: NEGATIVE
METHADONE UR POC: NEGATIVE
METHAMPHETAMINE UR POC: NEGATIVE
METHYLPHENIDATE UR POC: NORMAL
OPIATES UR POC: NEGATIVE
OXYCODONE UR POC: NORMAL
PHENCYCLIDINE UR POC: NORMAL
PROPOXYPHENE UR POC: NORMAL
TRAMADOL UR POC: NORMAL
TRICYCLICS UR POC: NEGATIVE

## 2018-08-24 RX ORDER — LIDOCAINE 50 MG/G
1 PATCH TOPICAL EVERY 24 HOURS
Qty: 3 PACKAGE | Refills: 5 | Status: SHIPPED | OUTPATIENT
Start: 2018-08-24 | End: 2019-05-07

## 2018-08-24 RX ORDER — TIZANIDINE 4 MG/1
4-8 TABLET ORAL 3 TIMES DAILY
Qty: 360 TAB | Refills: 2 | Status: SHIPPED | OUTPATIENT
Start: 2018-08-24

## 2018-08-24 RX ORDER — DOCUSATE SODIUM 100 MG/1
100 CAPSULE, LIQUID FILLED ORAL
Qty: 60 CAP | Refills: 2 | Status: ON HOLD | OUTPATIENT
Start: 2018-08-24 | End: 2019-08-28

## 2018-08-24 RX ORDER — GABAPENTIN 600 MG/1
1200 TABLET ORAL 3 TIMES DAILY
Qty: 540 TAB | Refills: 2 | Status: SHIPPED | OUTPATIENT
Start: 2018-08-24 | End: 2019-08-02 | Stop reason: SDUPTHER

## 2018-08-24 RX ORDER — FENTANYL 50 UG/1
1 PATCH TRANSDERMAL
Qty: 15 PATCH | Refills: 0 | Status: SHIPPED | OUTPATIENT
Start: 2018-08-24 | End: 2018-09-18 | Stop reason: SDUPTHER

## 2018-08-24 RX ORDER — SENNOSIDES 8.6 MG/1
1 TABLET ORAL
Qty: 30 TAB | Refills: 2 | Status: SHIPPED | OUTPATIENT
Start: 2018-08-24

## 2018-08-24 RX ORDER — OXYCODONE HYDROCHLORIDE 10 MG/1
10 TABLET ORAL
Qty: 30 TAB | Refills: 0 | Status: SHIPPED | OUTPATIENT
Start: 2018-08-24 | End: 2018-09-18 | Stop reason: SDUPTHER

## 2018-08-24 NOTE — PROGRESS NOTES
Referral date 7+ years ago, source ? ? and for chronic lower back pain. HPI:  Kim Germain is a 71 y.o. female here for f/u visit for ongoing evaluation of chronic pain due to lumbar DDD and OA. Pt was last seen here on 6/12/2018. Pt denies interval changes on the character or distribution of her chronic pain. She reports new and worsening pain in her hips and left shoulder; advised patient to call her orthopedist Dr Braxton Dubose for follow up regarding this new and worsening pain. Pain is located lower lumbar belt line region, neck, mid back, legs and feet. The pain is described as stabbing, aching, burning and numbness. She also has \"muslce spasms\" in her lower back left side. Pain at its best is 3/10. Pain at its worse is 8/10. The pain is worsened by driving, \"moving a certain way\", prolonged sitting, prolonged standing and prolonged walking. Symptoms are relieved by sleep, elevating legs, Lidoderm patches, biofreeze topical cream, Zanaflex, tylenol, opioid medications, Gabapentin, heat and massage. Pt has tried PT and in the past with no perceived benefit. She has not tried aquatic PT, acupuncture, chiropractor, Cymbalta, TCA, Topamax, H-wave, Qutenza or NexWave E-stem device. She is no longer taking Ativan. Social History     Social History    Marital status:      Spouse name: N/A    Number of children: N/A    Years of education: N/A     Occupational History    Not on file.      Social History Main Topics    Smoking status: Never Smoker    Smokeless tobacco: Never Used    Alcohol use Not on file    Drug use: Not on file    Sexual activity: Yes     Birth control/ protection: Surgical      Comment: BTL     Other Topics Concern    Not on file     Social History Narrative     Family History   Problem Relation Age of Onset    Hypertension Mother     Heart Disease Father     Diabetes Neg Hx      Allergies   Allergen Reactions    Codeine Nausea Only    Nsaids (Non-Steroidal Anti-Inflammatory Drug) Diarrhea     Abdominal pain    Sulfa (Sulfonamide Antibiotics) Hives     Past Medical History:   Diagnosis Date    GERD (gastroesophageal reflux disease)     Hiatal hernia     Hypertension     Nausea     Osteoarthritis     Sacroiliitis (HCC)      Past Surgical History:   Procedure Laterality Date    HX APPENDECTOMY      HX CHOLECYSTECTOMY      HX GYN      uterine surgery    HX KNEE REPLACEMENT       Current Outpatient Prescriptions on File Prior to Visit   Medication Sig    fentaNYL (DURAGESIC) 50 mcg/hr PATCH 1 Patch by TransDERmal route every fourty-eight (48) hours. Max Daily Amount: 1 Patch. for chronic, severe, refractory pain. Mylan, Sandoz, or [place] brands only    naloxone 4 mg/actuation spry 4 mg by Nasal route once as needed (for opioid overdose) for up to 1 dose. Indications: OPIOID TOXICITY    lisinopril (PRINIVIL, ZESTRIL) 20 mg tablet Take 20 mg by mouth daily.  ESOMEPRAZOLE MAG TRIHYDRATE (NEXIUM PO) Take  by mouth. No current facility-administered medications on file prior to visit. ROS:  Reports chills (normal for patient), diarrhea (\"once in a while\", resolved) and abdominal pain (new, sees PCP on Tuesday). Denies fever, nausea, vomiting, constipation, chest pain, shortness or breath/trouble breathing, weakness, trouble swallowing, changes in vision, changes in hearing, falls, dizziness, bladder incontinence, bowel incontinence, depression, anxiety, suicidal ideations, homicidal ideations or alcohol use. Opioid specific risk: GERD, MAHAMED on CPAP.       Vitals:    08/24/18 1157   BP: (!) 194/97   Pulse: 67   Resp: 22   Temp: 95.6 °F (35.3 °C)   TempSrc: Oral   Weight: 56.2 kg (124 lb)   Height: 5' 1\" (1.549 m)   PainSc:   5   PainLoc: Back        Imaging:  MRI Lumbar Spine WO Contrast 4/17/2017  \"\"IMPRESSION:  1.  Levoconvex rotary scoliosis of the upper lumbar spine with associated multilevel spondylosis or spondylolisthesis, similar to the prior examination without acute findings. 2.  There is new degenerative type I Modic endplate edema at J9-M8 which can be a source of pain. 3.  Moderate asymmetric foraminal stenosis on the right at T12-L1 and L1-L2 as well as L2-L3 and to a lesser extent in the left at L5-S1.  Otherwise, no significant canal or foraminal stenosis is appreciated. \"\"    Labs:   BUN/Cr: 15/1.09 on 5/29/2015  Cr: 0.9 on 8/25/2017  AST/ALT: 17/9 on 5/29/2015    Physical exam:  AFVSS with elevated blood pressure noted (pt states she was crying and upset with the triage process resulting in elevated BP), no acute distress, normal body habitus. A&OXs 3. Normocephalic, atraumatic. Conjugate gaze, clear sclerae. Speech is clear and appropriate. Mood is appropriate and patient is cooperative. Gait and balance are within functional limits. Non-labored breathing. Strength for right lower extremity is 5/5 for hip flexion, knee extension, dorsiflexion, extensor hallucis longus, plantar flexion. Strength for left lower extremity is 5/5 for hip flexion, knee extension, dorsiflexion, extensor hallucis longus, plantar flexion. Sensation to light touch is intact for right L2-S2. Sensation to light touch is intact for left L2-S2. Muscle Stretch reflexes for right lower extremity are 2+ for L4, S1.   Muscle Stretch reflexes for left lower extremity are 2+ for L4, S1.   Negative ankle clonus on the right and the left. Downgoing plantar response on the right and the left. Negative seated straight leg raise bilaterally. Positive supine straight leg raise on the right. .  Positive Stinchfield's on the right. Positive FABERs on the right. Full AROM lumbar flexion without reproduction of primary pain. Full AROM lumbar extension with reproduction of primary pain. Right trapezius muscle trigger point noted. Midline cervical and lumbar regions TTP. Bilateral SIJ regions TTP.     Calculated MEQ - 225  Naloxone rescue - yes  Prophylactic bowel program - no, ordered today  Date of last OCA today  Last UDS today, consistent   date checked today, findings consistent    Primary Care Physician  Gorge Hayde [address] [place]  [phone]    Today  ORT - 1  PGIC - 5 and 3  VIDA - 54%  COMM - 6    PHQ -- . PHQ over the last two weeks 8/24/2018   Little interest or pleasure in doing things More than half the days   Feeling down, depressed, irritable, or hopeless Not at all   Total Score PHQ 2 2         Assessment/Plan:     ICD-10-CM ICD-9-CM    1. Chronic pain syndrome G89.4 338.4 lidocaine (LIDODERM) 5 %      fentaNYL (DURAGESIC) 50 mcg/hr PATCH      oxyCODONE IR (ROXICODONE) 10 mg tab immediate release tablet      gabapentin (NEURONTIN) 600 mg tablet   2. Encounter for long-term (current) use of high-risk medication Z79.899 V58.69 DRUG SCREEN      AMB POC DRUG SCREEN ()      senna (SENNA) 8.6 mg tablet      docusate sodium (COLACE) 100 mg capsule   3. Sacroiliac joint pain M53.3 724.6 lidocaine (LIDODERM) 5 %      fentaNYL (DURAGESIC) 50 mcg/hr PATCH      oxyCODONE IR (ROXICODONE) 10 mg tab immediate release tablet      gabapentin (NEURONTIN) 600 mg tablet   4. Primary osteoarthritis of both knees M17.0 715.16 lidocaine (LIDODERM) 5 %      fentaNYL (DURAGESIC) 50 mcg/hr PATCH      oxyCODONE IR (ROXICODONE) 10 mg tab immediate release tablet      gabapentin (NEURONTIN) 600 mg tablet   5. Lumbosacral spondylosis without myelopathy M47.817 721.3 lidocaine (LIDODERM) 5 %      fentaNYL (DURAGESIC) 50 mcg/hr PATCH      oxyCODONE IR (ROXICODONE) 10 mg tab immediate release tablet      gabapentin (NEURONTIN) 600 mg tablet   6. Chronic bilateral low back pain without sciatica M54.5 724.2 lidocaine (LIDODERM) 5 %    G89.29 338.29 fentaNYL (DURAGESIC) 50 mcg/hr PATCH      oxyCODONE IR (ROXICODONE) 10 mg tab immediate release tablet      gabapentin (NEURONTIN) 600 mg tablet   7.  Pain in joint, multiple sites M25.50 719.49 lidocaine (LIDODERM) 5 %      fentaNYL (DURAGESIC) 50 mcg/hr PATCH      oxyCODONE IR (ROXICODONE) 10 mg tab immediate release tablet      gabapentin (NEURONTIN) 600 mg tablet   8. Spasm of back muscles M62.830 724.8 lidocaine (LIDODERM) 5 %      tiZANidine (ZANAFLEX) 4 mg tablet      fentaNYL (DURAGESIC) 50 mcg/hr PATCH      oxyCODONE IR (ROXICODONE) 10 mg tab immediate release tablet      gabapentin (NEURONTIN) 600 mg tablet   9. Status post bilateral knee replacements Z96.653 V43.65    10. Sacroiliitis (HCC) M46.1 720.2 lidocaine (LIDODERM) 5 %      fentaNYL (DURAGESIC) 50 mcg/hr PATCH      oxyCODONE IR (ROXICODONE) 10 mg tab immediate release tablet      gabapentin (NEURONTIN) 600 mg tablet   11. DDD (degenerative disc disease), lumbar M51.36 722.52 lidocaine (LIDODERM) 5 %      fentaNYL (DURAGESIC) 50 mcg/hr PATCH      oxyCODONE IR (ROXICODONE) 10 mg tab immediate release tablet      gabapentin (NEURONTIN) 600 mg tablet   12. Enthesopathy of hip region on both sides M76.891 726.5 lidocaine (LIDODERM) 5 %    M76.892  fentaNYL (DURAGESIC) 50 mcg/hr PATCH      oxyCODONE IR (ROXICODONE) 10 mg tab immediate release tablet      gabapentin (NEURONTIN) 600 mg tablet   13. Chronic midline thoracic back pain M54.6 724.1 lidocaine (LIDODERM) 5 %    G89.29 338.29 fentaNYL (DURAGESIC) 50 mcg/hr PATCH      oxyCODONE IR (ROXICODONE) 10 mg tab immediate release tablet      gabapentin (NEURONTIN) 600 mg tablet        Do not recommend long term opioid therapy for this patient at this time. Pt currently taking fentanyl 50mcg/hr patch changed every 48 hours and oxycodone IR 30mg daily as needed with a total of 25 tablets to be budgeted over 30 days. Their MME is 225. Today, we will continue the weaning of patients opioid medication with a goal of being opioid free, pending safety and compliance. Pt instructed to call if they experience any signs of withdrawal (diarrhea, nausea, vomiting, sweating or chills, agitation, itching). Today, pt given prescription for fentanyl 50mcg/hr patch changed every 48 hours and her oxycodone IR was decreased to 10mg daily as needed with a total of 30 tablets to be used over 30 days. Their new MME is 195. Pt instructed to call 4 days before they run out of their medications for refill. At this time pt will be provided with fentanyl 50mcg/hr patch changed every 72 hours and oxycodone IR 10mg up to 2 times daily as needed, pending safety and compliance. Her new MME will be 150. At following refill, pt will be provided with fentanyl 50mcg/hr patch changed every 72 hours and oxycodone IR 10mg daily as needed, pending safety and compliance. Her new MME will be 135. At next office visit, the plan is to decrease fentanyl 37.5mch/hr patch changed every 72 hours and keep oxycodone IR the same. Their new MME will be 105. If patient has difficulty with the wean or difficulty with cravings we will consider referral to mental health for ongoing assessment and treatment for opioid use disorder. NexWave E-Stim device, capsaicin topical cream, Senna and Colace ordered today to be used as needed. Lidoderm patches, Gabapentin and Zanaflex refilled today. Pt would benefit from aquatic PT, acupuncture therapy, H-wave and/or Qutenza patch in the future. In the future consider tylenol optimization. Follow up ongoing assessment and ongoing development of integrative and comprehensive plan of care for chronic pain. Pt expresses with to continue pain management at a clinic closer to her home in [place]. She has requested her records be transferred to Dr Juan Jose Gomez office. Advised patient to make 3 month follow up appointment that she can cancel if needed at any time. Pt states understanding. Goals: To establish complementary and integrative plan of care to address chronic pain issues while minimizing pharmaceuticals to maximize patient's function improve quality of life.     Education:  Patient again educated on the importance of strict compliance with the opioid care agreement while on opioid therapy. Patient also again educated that they should avoid driving while on chronic opioid therapy. Also advised to avoid alcohol and to avoid benzodiazepines while on opioid therapy. Handouts given regarding opioid safety. Follow-up Disposition:  Return in about 3 months (around 11/24/2018) for 30 min. [address] was used for portions of this report. Unintended errors may occur.

## 2018-08-24 NOTE — PATIENT INSTRUCTIONS
Safe Use of Opioid Pain Medicine: Care Instructions  Your Care Instructions  Pain is your body's way of warning you that something is wrong. Pain feels different for everybody. Only you can describe your pain. A doctor can suggest or prescribe many types of medicines for pain. These range from over-the-counter medicines like acetaminophen (Tylenol) to powerful medicines called opioids. Examples of opioids are fentanyl, hydrocodone, morphine, and oxycodone. Heroin is an illegal opioid  Opioids are strong medicines. They can help you manage pain when you use them the right way. But if you misuse them, they can cause serious harm and even death. For these reasons, doctors are very careful about how they prescribe opioids. If you decide to take opioids, here are some things to remember. · Keep your doctor informed. You can get addicted to opioids. The risk is higher if you have a history of substance use. Your doctor will monitor you closely for signs of misuse and addiction and to figure out when you no longer need to take opioids. · Make a treatment plan. The goal of your plan is to be able to function and do the things you need to do, even if you still have some pain. You might be able to manage your pain with other non-opioid options like physical therapy, relaxation, or over-the-counter pain medicines. · Be aware of the side effects. Opioids can cause serious side effects, such as constipation, dry mouth, and nausea. And over time, you may need a higher dose to get pain relief. This is called tolerance. Your body also gets used to opioids. This is called physical dependence. If you suddenly stop taking them, you may have withdrawal symptoms. The doctor carefully considered what pain medicine is right for you. You may not have received opioids if your doctor was concerned about drug interactions or your safety, or if he or she had other concerns. It is best to have one doctor or clinic treat your pain. This way you will get the pain medicine that will help you the most. And a doctor will be able to watch for any problems that the medicine might cause. The doctor has checked you carefully, but problems can develop later. If you notice any problems or new symptoms,  get medical treatment right away. Follow-up care is a key part of your treatment and safety. Be sure to make and go to all appointments, and call your doctor if you are having problems. It's also a good idea to know your test results and keep a list of the medicines you take. How can you care for yourself at home? · If you need to take opioids to manage your pain, remember these safety tips. ¨ Follow directions carefully. It's easy to misuse opioids if you take a dose other than what's prescribed by your doctor. This can lead to overdose and even death. Even sharing them with someone they weren't meant for is misuse. ¨ Be cautious. Opioids may affect your judgment and decision making. Do not drive or operate machinery until you can think clearly. Talk with your doctor about when it is safe to drive. ¨ Reduce the risk of drug interactions. Opioids can be dangerous if you take them with alcohol or with certain drugs like sleeping pills and muscle relaxers. Make sure your doctor knows about all the other medicines you take, including over-the-counter medicines. Don't start any new medicines before you talk to your doctor or pharmacist.  Reina Paredes Keep others safe. Store opioids in a safe and secure place. Make sure that pets, children, friends, and family can't get to them. When you're done using opioids, make sure to properly dispose of them. You can either use a community drug take-back program or your drugstore's mail-back program. If one of these programs isn't available, you can flush opioid skin patches and unused opioid pills down the toilet. ¨ Reduce the risk of overdose. Misuse of opioids can be very dangerous.  Protect yourself by asking your doctor about a naloxone rescue kit. It can help you-and even save your life-if you take too much of an opioid. · Try other ways to reduce pain. ¨ Relax, and reduce stress. Relaxation techniques such as deep breathing or meditation can help. ¨ Keep moving. Gentle, daily exercise can help reduce pain over the long run. Try low- or no-impact exercises such as walking, swimming, and stationary biking. Do stretches to stay flexible. ¨ Try heat, cold packs, and massage. ¨ Get enough sleep. Pain can make you tired and drain your energy. Talk with your doctor if you have trouble sleeping because of pain. ¨ Think positive. Your thoughts can affect your pain level. Do things that you enjoy to distract yourself when you have pain instead of focusing on the pain. See a movie, read a book, listen to music, or spend time with a friend. · If you are not taking a prescription pain medicine, ask your doctor if you can take an over-the-counter medicine. When should you call for help? Call your doctor now or seek immediate medical care if:    · You have a new kind of pain.     · You have new symptoms, such as a fever or rash, along with the pain.    Watch closely for changes in your health, and be sure to contact your doctor if:    · You think you might be using too much pain medicine, and you need help to use less or stop.     · Your pain gets worse.     · You would like a referral to a doctor or clinic that specializes in pain management. Where can you learn more? Go to http://anna marie-manju.info/. Enter R108 in the search box to learn more about \"Safe Use of Opioid Pain Medicine: Care Instructions. \"  Current as of: September 10, 2017  Content Version: 11.7  © 3758-3549 [employer]. Care instructions adapted under license by [employer] (which disclaims liability or warranty for this information).  If you have questions about a medical condition or this instruction, always ask your healthcare professional. [address] any warranty or liability for your use of this information. Acute High Blood Pressure: Care Instructions  Your Care Instructions    Acute high blood pressure is very high blood pressure. It's a serious problem. Very high blood pressure can damage your brain, heart, eyes, and kidneys. You may have been given medicines to lower your blood pressure. You may have gotten them as pills or through a needle in one of your veins. This is called an IV. And maybe you were given other medicines too. These can be needed when high blood pressure causes other problems. To keep your blood pressure at a lower level, you may need to make healthy lifestyle changes. And you will probably need to take medicines. Be sure to follow up with your doctor about your blood pressure and what you can do about it. Follow-up care is a key part of your treatment and safety. Be sure to make and go to all appointments, and call your doctor if you are having problems. It's also a good idea to know your test results and keep a list of the medicines you take. How can you care for yourself at home? · See your doctor as often as he or she recommends. This is to make sure your blood pressure is under control. You will probably go at least 2 times a year. But it may be more often at first.  · Take your blood pressure medicine exactly as prescribed. You may take one or more types. They include diuretics, beta-blockers, ACE inhibitors, calcium channel blockers, and angiotensin II receptor blockers. Call your doctor if you think you are having a problem with your medicine. · If you take blood pressure medicine, talk to your doctor before you take decongestants or anti-inflammatory medicine, such as ibuprofen. These can raise blood pressure. · Learn how to check your blood pressure at home. Check it often. · Ask your doctor if it's okay to drink alcohol.   · Talk to your doctor about lifestyle changes that can help blood pressure. These include being active and not smoking. When should you call for help? Call 911 anytime you think you may need emergency care. This may mean having symptoms that suggest that your blood pressure is causing a serious heart or blood vessel problem. Your blood pressure may be over 180/110.   For example, call 911 if:    · You have symptoms of a heart attack. These may include:  ¨ Chest pain or pressure, or a strange feeling in the chest.  ¨ Sweating. ¨ Shortness of breath. ¨ Nausea or vomiting. ¨ Pain, pressure, or a strange feeling in the back, neck, jaw, or upper belly or in one or both shoulders or arms. ¨ Lightheadedness or sudden weakness. ¨ A fast or irregular heartbeat.     · You have symptoms of a stroke. These may include:  ¨ Sudden numbness, tingling, weakness, or loss of movement in your face, arm, or leg, especially on only one side of your body. ¨ Sudden vision changes. ¨ Sudden trouble speaking. ¨ Sudden confusion or trouble understanding simple statements. ¨ Sudden problems with walking or balance. ¨ A sudden, severe headache that is different from past headaches.     · You have severe back or belly pain.    Do not wait until your blood pressure comes down on its own. Get help right away.   Call your doctor now or seek immediate care if:    · Your blood pressure is much higher than normal (such as 180/110 or higher), but you don't have symptoms.     · You think high blood pressure is causing symptoms, such as:  ¨ Severe headache. ¨ Blurry vision.    Watch closely for changes in your health, and be sure to contact your doctor if:    · Your blood pressure measures 140/90 or higher at least 2 times.  That means the top number is 140 or higher or the bottom number is 90 or higher, or both.     · You think you may be having side effects from your blood pressure medicine.     · Your blood pressure is usually normal, but it goes above normal at least 2 times. Where can you learn more? Go to http://anna marie-manju.info/. Enter O992 in the search box to learn more about \"Acute High Blood Pressure: Care Instructions. \"  Current as of: May 10, 2017  Content Version: 11.7  © 1228-3921 [employer]. Care instructions adapted under license by [employer] (which disclaims liability or warranty for this information). If you have questions about a medical condition or this instruction, always ask your healthcare professional. [address] any warranty or liability for your use of this information.

## 2018-08-24 NOTE — MR AVS SNAPSHOT
[address] [address] 
[phone] Patient: Yasir Perez MRN: [id number] :1945 Visit Information Date & Time Provider Department Dept. Phone Encounter #  
 2018 11:00 AM Elena Bueno, [address] Pain Management   Follow-up Instructions Return in about 3 months (around 2018) for 30 min. Upcoming Health Maintenance Date Due Hepatitis C Screening 1945 DTaP/Tdap/Td series (1 - Tdap) 1967 BREAST CANCER SCRN MAMMOGRAM 1996 FOBT Q 1 YEAR AGE 50-75 1996 ZOSTER VACCINE AGE 60> 10/2/2006 GLAUCOMA SCREENING Q2Y 2011 Bone Densitometry (Dexa) Screening 2011 Pneumococcal 65+ Low/Medium Risk (1 of 2 - PCV13) 2011 MEDICARE YEARLY EXAM 3/14/2018 Influenza Age 5 to Adult 2018 Allergies as of 2018  Review Complete On: 2018 By: Scarlet De La Fuente LPN Severity Noted Reaction Type Reactions Codeine    Nausea Only Nsaids (Non-steroidal Anti-inflammatory Drug)    Diarrhea Abdominal pain  
 Sulfa (Sulfonamide Antibiotics)    Hives Current Immunizations  Never Reviewed No immunizations on file. Not reviewed this visit You Were Diagnosed With   
  
 Codes Comments Encounter for long-term (current) use of high-risk medication    -  Primary ICD-10-CM: U13.270 ICD-9-CM: V58.69 Chronic pain syndrome     ICD-10-CM: G89.4 ICD-9-CM: 338.4 Sacroiliac joint pain     ICD-10-CM: M53.3 ICD-9-CM: 724.6 Primary osteoarthritis of both knees     ICD-10-CM: M17.0 ICD-9-CM: 715.16 Lumbosacral spondylosis without myelopathy     ICD-10-CM: T02.414 ICD-9-CM: 521. 3 Chronic bilateral low back pain without sciatica     ICD-10-CM: M54.5, G89.29 ICD-9-CM: 724.2, 338.29 Pain in joint, multiple sites     ICD-10-CM: M25.50 ICD-9-CM: 719.49   
 Spasm of back muscles     ICD-10-CM: M62.830 ICD-9-CM: 724.8 Status post bilateral knee replacements     ICD-10-CM: P59.404 ICD-9-CM: V43.65 Sacroiliitis ([address]     ICD-10-CM: M46.1 ICD-9-CM: 720.2 DDD (degenerative disc disease), lumbar     ICD-10-CM: M51.36 
ICD-9-CM: 722.52 Enthesopathy of hip region on both sides     ICD-10-CM: M76.891, F58.817 ICD-9-CM: 726.5 MAHAMED on CPAP     ICD-10-CM: G47.33, Z99.89 ICD-9-CM: 327.23, V46.8 Spasm of muscle     ICD-10-CM: O27.423 ICD-9-CM: 728.85 Chronic midline thoracic back pain     ICD-10-CM: M54.6, G89.29 ICD-9-CM: 724.1, 338.29 Vitals BP Pulse Temp Resp Height(growth percentile) Weight(growth percentile) (!) 194/97 (BP 1 Location: Right arm, BP Patient Position: Sitting) 67 95.6 °F (35.3 °C) (Oral) 22 5' 1\" (1.549 m) 124 lb (56.2 kg) BMI OB Status Smoking Status 23.43 kg/m2 Postmenopausal Never Smoker Vitals History BMI and BSA Data Body Mass Index Body Surface Area  
 23.43 kg/m 2 1.56 m 2 Preferred Pharmacy Pharmacy Name Phone [facility]tholome[facility] [address], [address] [facility] [phone] Your Updated Medication List  
  
   
This list is accurate as of 8/24/18 12:55 PM.  Always use your most recent med list.  
  
  
  
  
 * fentaNYL 50 mcg/hr PATCH Commonly known as:  DURAGESIC  
1 Patch by TransDERmal route every fourty-eight (48) hours. Max Daily Amount: 1 Patch. for chronic, severe, refractory pain. Mylan, Sandoz, or Mallinckrodt brands only * fentaNYL 50 mcg/hr PATCH Commonly known as:  DURAGESIC  
1 Patch by TransDERmal route every fourty-eight (48) hours. Max Daily Amount: 1 Patch. for chronic, severe, refractory pain. Mylan, Sandoz, or Mallinckrodt brands only  
  
 gabapentin 600 mg tablet Commonly known as:  NEURONTIN Take 2 Tabs by mouth three (3) times daily. lidocaine 5 % Commonly known as:  [place]  
 1 Patch by TransDERmal route every twenty-four (24) hours. lisinopril 20 mg tablet Commonly known as:  Katelynlynne Shows Take 20 mg by mouth daily. naloxone 4 mg/actuation nasal spray Commonly known as:  NARCAN  
4 mg by Nasal route once as needed (for opioid overdose) for up to 1 dose. Indications: OPIOID TOXICITY  
  
 NEXIUM PO Take  by mouth. oxyCODONE IR 10 mg Tab immediate release tablet Commonly known as:  Bobo Shaikh Take 1 Tab by mouth daily as needed for Pain for up to 30 days. Indications: NEUROPATHIC PAIN, Pain  
  
 tiZANidine 4 mg tablet Commonly known as:  Orrtanna Rink Take 1-2 Tabs by mouth three (3) times daily. As needed for muscle spasms * Notice: This list has 2 medication(s) that are the same as other medications prescribed for you. Read the directions carefully, and ask your doctor or other care provider to review them with you. Prescriptions Printed Refills  
 fentaNYL (DURAGESIC) 50 mcg/hr PATCH 0 Si Patch by TransDERmal route every fourty-eight (48) hours. Max Daily Amount: 1 Patch. for chronic, severe, refractory pain. Mylan, Sandoz, or Mallinckrodt brands only Class: Print Route: TransDERmal  
 oxyCODONE IR (ROXICODONE) 10 mg tab immediate release tablet 0 Sig: Take 1 Tab by mouth daily as needed for Pain for up to 30 days. Indications: NEUROPATHIC PAIN, Pain Class: Print Route: Oral  
  
Prescriptions Sent to Pharmacy Refills  
 lidocaine (LIDODERM) 5 % 5 Si Patch by TransDERmal route every twenty-four (24) hours. Class: Normal  
 Pharmacy: [address] Ph #: [phone] Route: TransDERmal  
 tiZANidine (ZANAFLEX) 4 mg tablet 2 Sig: Take 1-2 Tabs by mouth three (3) times daily. As needed for muscle spasms Class: Normal  
 Pharmacy: [address] Ph #: [phone] Route: Oral  
  
We Performed the Following AMB POC DRUG SCREEN () [ [facility]] DRUG SCREEN [[id number] Custom] Follow-up Instructions Return in about 3 months (around 11/24/2018) for 30 min. Patient Instructions Acute High Blood Pressure: Care Instructions Your Care Instructions Acute high blood pressure is very high blood pressure. It's a serious problem. Very high blood pressure can damage your brain, heart, eyes, and kidneys. You may have been given medicines to lower your blood pressure. You may have gotten them as pills or through a needle in one of your veins. This is called an IV. And maybe you were given other medicines too. These can be needed when high blood pressure causes other problems. To keep your blood pressure at a lower level, you may need to make healthy lifestyle changes. And you will probably need to take medicines. Be sure to follow up with your doctor about your blood pressure and what you can do about it. Follow-up care is a key part of your treatment and safety. Be sure to make and go to all appointments, and call your doctor if you are having problems. It's also a good idea to know your test results and keep a list of the medicines you take. How can you care for yourself at home? · See your doctor as often as he or she recommends. This is to make sure your blood pressure is under control. You will probably go at least 2 times a year. But it may be more often at first. 
· Take your blood pressure medicine exactly as prescribed. You may take one or more types. They include diuretics, beta-blockers, ACE inhibitors, calcium channel blockers, and angiotensin II receptor blockers. Call your doctor if you think you are having a problem with your medicine. · If you take blood pressure medicine, talk to your doctor before you take decongestants or anti-inflammatory medicine, such as ibuprofen. These can raise blood pressure. · Learn how to check your blood pressure at home. Check it often. · Ask your doctor if it's okay to drink alcohol. · Talk to your doctor about lifestyle changes that can help blood pressure. These include being active and not smoking. When should you call for help? Call 911 anytime you think you may need emergency care. This may mean having symptoms that suggest that your blood pressure is causing a serious heart or blood vessel problem. Your blood pressure may be over 180/110. 
 For example, call 911 if: 
  · You have symptoms of a heart attack. These may include: ¨ Chest pain or pressure, or a strange feeling in the chest. 
¨ Sweating. ¨ Shortness of breath. ¨ Nausea or vomiting. ¨ Pain, pressure, or a strange feeling in the back, neck, jaw, or upper belly or in one or both shoulders or arms. ¨ Lightheadedness or sudden weakness. ¨ A fast or irregular heartbeat.  
  · You have symptoms of a stroke. These may include: 
¨ Sudden numbness, tingling, weakness, or loss of movement in your face, arm, or leg, especially on only one side of your body. ¨ Sudden vision changes. ¨ Sudden trouble speaking. ¨ Sudden confusion or trouble understanding simple statements. ¨ Sudden problems with walking or balance. ¨ A sudden, severe headache that is different from past headaches.  
  · You have severe back or belly pain.  
 Do not wait until your blood pressure comes down on its own. Get help right away. 
 Call your doctor now or seek immediate care if: 
  · Your blood pressure is much higher than normal (such as 180/110 or higher), but you don't have symptoms.  
  · You think high blood pressure is causing symptoms, such as: ¨ Severe headache. ¨ Blurry vision.  
 Watch closely for changes in your health, and be sure to contact your doctor if: 
  · Your blood pressure measures 140/90 or higher at least 2 times. That means the top number is 140 or higher or the bottom number is 90 or higher, or both.   · You think you may be having side effects from your blood pressure medicine.  
  · Your blood pressure is usually normal, but it goes above normal at least 2 times. Where can you learn more? Go to http://anna marie-manju.info/. Enter N172 in the search box to learn more about \"Acute High Blood Pressure: Care Instructions. \" Current as of: May 10, 2017 Content Version: 11.7 © 7258-5721 [employer]. Care instructions adapted under license by [employer] (which disclaims liability or warranty for this information). If you have questions about a medical condition or this instruction, always ask your healthcare professional. [address] any warranty or liability for your use of this information. Introducing [facility]! Marianna Gardner introduces AMEE patient portal. Now you can access parts of your medical record, email your doctor's office, and request medication refills online. 1. In your internet browser, go to [email or web address] 2. Click on the First Time User? Click Here link in the Sign In box. You will see the New Member Sign Up page. 3. Enter your AMEE Access Code exactly as it appears below. You will not need to use this code after youve completed the sign-up process. If you do not sign up before the expiration date, you must request a new code. · AMEE Access Code: [email or web address] Expires: 11/22/2018 12:55 PM 
 
4. Enter the last four digits of your Social Security Number (xxxx) and Date of Birth (mm/dd/yyyy) as indicated and click Submit. You will be taken to the next sign-up page. 5. Create a AMEE ID. This will be your AMEE login ID and cannot be changed, so think of one that is secure and easy to remember. 6. Create a AMEE password. You can change your password at any time. 7. Enter your Password Reset Question and Answer.  This can be used at a later time if you forget your password. 8. Enter your e-mail address. You will receive e-mail notification when new information is available in [address] 9. Click Sign Up. You can now view and download portions of your medical record. 10. Click the Download Summary menu link to download a portable copy of your medical information. If you have questions, please visit the Frequently Asked Questions section of the E[employer].[email or web address] website. Remember, E[email or web address] is NOT to be used for urgent needs. For medical emergencies, dial 911. Now available from your iPhone and Android! Please provide this summary of care documentation to your next provider. Your primary care clinician is listed as Stephanie Leslie. If you have any questions after today's visit, please call [phone].

## 2018-08-24 NOTE — PROGRESS NOTES
Nursing Notes    Patient presents to the office today in follow-up. Patient rates her pain at 5/10 on the numerical pain scale. Reviewed medications with counts as follows:    Rx Date filled Qty Dispensed Pill Count Last Dose Short   Fentanyl 50mcg 07/20/18 10 2 08/22/18 No    Oxycodone 30mg  07/20/18 25 17 08/22/18 No                                     Comments: b/p elevated; patient states \"she is upset\" ; acute hypertension education given; provider made aware. POC UDS was performed in office today per verbal order of provider (KS); rbv'd. Any new labs or imaging since last appointment? YES; labwork     Have you been to an emergency room (ER) or urgent care clinic since your last visit? NO            Have you been hospitalized since your last visit? NO     If yes, where, when, and reason for visit? Have you seen or consulted any other health care providers outside of the [address] [place] [address]  since your last visit? NO     If yes, where, when, and reason for visit? pcp , sleep doctor   Ms. Keron Lemus has a reminder for a \"due or due soon\" health maintenance. I have asked that she contact her primary care provider for follow-up on this health maintenance.

## 2018-09-18 DIAGNOSIS — M47.817 LUMBOSACRAL SPONDYLOSIS WITHOUT MYELOPATHY: ICD-10-CM

## 2018-09-18 DIAGNOSIS — M25.50 PAIN IN JOINT, MULTIPLE SITES: ICD-10-CM

## 2018-09-18 DIAGNOSIS — G89.4 CHRONIC PAIN SYNDROME: ICD-10-CM

## 2018-09-18 DIAGNOSIS — M53.3 SACROILIAC JOINT PAIN: ICD-10-CM

## 2018-09-18 DIAGNOSIS — M54.6 CHRONIC MIDLINE THORACIC BACK PAIN: ICD-10-CM

## 2018-09-18 DIAGNOSIS — M17.0 PRIMARY OSTEOARTHRITIS OF BOTH KNEES: ICD-10-CM

## 2018-09-18 DIAGNOSIS — M54.50 CHRONIC BILATERAL LOW BACK PAIN WITHOUT SCIATICA: ICD-10-CM

## 2018-09-18 DIAGNOSIS — M76.892 ENTHESOPATHY OF HIP REGION ON BOTH SIDES: ICD-10-CM

## 2018-09-18 DIAGNOSIS — G89.29 CHRONIC MIDLINE THORACIC BACK PAIN: ICD-10-CM

## 2018-09-18 DIAGNOSIS — M51.36 DDD (DEGENERATIVE DISC DISEASE), LUMBAR: ICD-10-CM

## 2018-09-18 DIAGNOSIS — G89.29 CHRONIC BILATERAL LOW BACK PAIN WITHOUT SCIATICA: ICD-10-CM

## 2018-09-18 DIAGNOSIS — M46.1 SACROILIITIS (HCC): ICD-10-CM

## 2018-09-18 DIAGNOSIS — M76.891 ENTHESOPATHY OF HIP REGION ON BOTH SIDES: ICD-10-CM

## 2018-09-18 DIAGNOSIS — M62.830 SPASM OF BACK MUSCLES: ICD-10-CM

## 2018-09-18 RX ORDER — FENTANYL 50 UG/1
1 PATCH TRANSDERMAL
Qty: 10 PATCH | Refills: 0 | Status: SHIPPED | OUTPATIENT
Start: 2018-09-18 | End: 2018-10-26 | Stop reason: SDUPTHER

## 2018-09-18 RX ORDER — OXYCODONE HYDROCHLORIDE 10 MG/1
10 TABLET ORAL
Qty: 60 TAB | Refills: 0 | Status: SHIPPED | OUTPATIENT
Start: 2018-09-18 | End: 2018-10-26 | Stop reason: SDUPTHER

## 2018-09-18 NOTE — TELEPHONE ENCOUNTER
Sujit Liu has called requesting a refill of their controlled medication, oxycodone and fentanyl, for the management of chornic pain syndrome. Last office visit date: 8/24/18 with Manuel, next 11/16/18 with Manuel    Date last  was pulled and reviewed : 9/18/18 last filled both 8/24/18    Was the patient compliant when the above report was pulled? yes    Analgesia: 50-60%    Aberrancies: none    ADL's: yes    Adverse Reaction: constipation occasionally    Provider's last note and plan of care reviewed? yes  Request forwarded to provider for review.

## 2018-09-18 NOTE — TELEPHONE ENCOUNTER
Medication refill called in [id number]  3:28pm    1. Name, verified  2. Medicine requested -oxycodone IR 10 mg,  Fentanyl patch  3. [phone]  4. Analgesia - 50-60% pain relief  5. ADL - yes  6.   Adverse reaction to medicine - constipation occasionally

## 2018-10-26 DIAGNOSIS — M54.6 CHRONIC MIDLINE THORACIC BACK PAIN: ICD-10-CM

## 2018-10-26 DIAGNOSIS — M46.1 SACROILIITIS (HCC): ICD-10-CM

## 2018-10-26 DIAGNOSIS — G89.29 CHRONIC BILATERAL LOW BACK PAIN WITHOUT SCIATICA: ICD-10-CM

## 2018-10-26 DIAGNOSIS — M47.817 LUMBOSACRAL SPONDYLOSIS WITHOUT MYELOPATHY: ICD-10-CM

## 2018-10-26 DIAGNOSIS — M53.3 SACROILIAC JOINT PAIN: ICD-10-CM

## 2018-10-26 DIAGNOSIS — M51.36 DDD (DEGENERATIVE DISC DISEASE), LUMBAR: ICD-10-CM

## 2018-10-26 DIAGNOSIS — G89.29 CHRONIC MIDLINE THORACIC BACK PAIN: ICD-10-CM

## 2018-10-26 DIAGNOSIS — M17.0 PRIMARY OSTEOARTHRITIS OF BOTH KNEES: ICD-10-CM

## 2018-10-26 DIAGNOSIS — M62.830 SPASM OF BACK MUSCLES: ICD-10-CM

## 2018-10-26 DIAGNOSIS — M76.892 ENTHESOPATHY OF HIP REGION ON BOTH SIDES: ICD-10-CM

## 2018-10-26 DIAGNOSIS — G89.4 CHRONIC PAIN SYNDROME: ICD-10-CM

## 2018-10-26 DIAGNOSIS — M25.50 PAIN IN JOINT, MULTIPLE SITES: ICD-10-CM

## 2018-10-26 DIAGNOSIS — M76.891 ENTHESOPATHY OF HIP REGION ON BOTH SIDES: ICD-10-CM

## 2018-10-26 DIAGNOSIS — M54.50 CHRONIC BILATERAL LOW BACK PAIN WITHOUT SCIATICA: ICD-10-CM

## 2018-10-26 RX ORDER — OXYCODONE HYDROCHLORIDE 10 MG/1
10 TABLET ORAL
Qty: 30 TAB | Refills: 0 | Status: SHIPPED | OUTPATIENT
Start: 2018-10-26 | End: 2018-11-16 | Stop reason: SDUPTHER

## 2018-10-26 RX ORDER — FENTANYL 50 UG/1
1 PATCH TRANSDERMAL
Qty: 10 PATCH | Refills: 0 | Status: SHIPPED | OUTPATIENT
Start: 2018-10-26 | End: 2018-11-16

## 2018-11-16 ENCOUNTER — OFFICE VISIT (OUTPATIENT)
Dept: PAIN MANAGEMENT | Age: 73
End: 2018-11-16

## 2018-11-16 VITALS
HEIGHT: 61 IN | HEART RATE: 63 BPM | WEIGHT: 124 LBS | SYSTOLIC BLOOD PRESSURE: 161 MMHG | RESPIRATION RATE: 14 BRPM | DIASTOLIC BLOOD PRESSURE: 81 MMHG | OXYGEN SATURATION: 95 % | BODY MASS INDEX: 23.41 KG/M2 | TEMPERATURE: 97.6 F

## 2018-11-16 DIAGNOSIS — M53.3 SACROILIAC JOINT PAIN: ICD-10-CM

## 2018-11-16 DIAGNOSIS — M17.0 PRIMARY OSTEOARTHRITIS OF BOTH KNEES: ICD-10-CM

## 2018-11-16 DIAGNOSIS — G89.29 CHRONIC BILATERAL LOW BACK PAIN WITHOUT SCIATICA: ICD-10-CM

## 2018-11-16 DIAGNOSIS — M54.6 CHRONIC MIDLINE THORACIC BACK PAIN: ICD-10-CM

## 2018-11-16 DIAGNOSIS — M51.36 DDD (DEGENERATIVE DISC DISEASE), LUMBAR: ICD-10-CM

## 2018-11-16 DIAGNOSIS — M54.50 CHRONIC BILATERAL LOW BACK PAIN WITHOUT SCIATICA: ICD-10-CM

## 2018-11-16 DIAGNOSIS — M25.50 PAIN IN JOINT, MULTIPLE SITES: ICD-10-CM

## 2018-11-16 DIAGNOSIS — M62.830 SPASM OF BACK MUSCLES: ICD-10-CM

## 2018-11-16 DIAGNOSIS — M76.892 ENTHESOPATHY OF HIP REGION ON BOTH SIDES: ICD-10-CM

## 2018-11-16 DIAGNOSIS — G89.29 CHRONIC MIDLINE THORACIC BACK PAIN: ICD-10-CM

## 2018-11-16 DIAGNOSIS — M76.891 ENTHESOPATHY OF HIP REGION ON BOTH SIDES: ICD-10-CM

## 2018-11-16 DIAGNOSIS — M46.1 SACROILIITIS (HCC): ICD-10-CM

## 2018-11-16 DIAGNOSIS — G89.4 CHRONIC PAIN SYNDROME: ICD-10-CM

## 2018-11-16 DIAGNOSIS — M47.817 LUMBOSACRAL SPONDYLOSIS WITHOUT MYELOPATHY: ICD-10-CM

## 2018-11-16 RX ORDER — OXYCODONE HYDROCHLORIDE 10 MG/1
10 TABLET ORAL
Qty: 60 TAB | Refills: 0 | Status: SHIPPED | OUTPATIENT
Start: 2018-11-28 | End: 2019-01-02 | Stop reason: SDUPTHER

## 2018-11-16 RX ORDER — FENTANYL 25 UG/1
1 PATCH TRANSDERMAL
Qty: 10 PATCH | Refills: 0 | Status: SHIPPED | OUTPATIENT
Start: 2018-11-28 | End: 2019-01-02 | Stop reason: SDUPTHER

## 2018-11-16 RX ORDER — FENTANYL 12.5 UG/1
1 PATCH TRANSDERMAL
Qty: 10 PATCH | Refills: 0 | Status: SHIPPED | OUTPATIENT
Start: 2018-11-28 | End: 2019-01-02 | Stop reason: SDUPTHER

## 2018-11-16 RX ORDER — OXYCODONE HYDROCHLORIDE 10 MG/1
10 TABLET ORAL
Qty: 60 TAB | Refills: 0 | Status: SHIPPED | OUTPATIENT
Start: 2018-11-16 | End: 2018-11-16

## 2018-11-16 NOTE — PATIENT INSTRUCTIONS
Safe Use of Opioid Pain Medicine: Care Instructions  Your Care Instructions  Pain is your body's way of warning you that something is wrong. Pain feels different for everybody. Only you can describe your pain. A doctor can suggest or prescribe many types of medicines for pain. These range from over-the-counter medicines like acetaminophen (Tylenol) to powerful medicines called opioids. Examples of opioids are fentanyl, hydrocodone, morphine, and oxycodone. Heroin is an illegal opioid  Opioids are strong medicines. They can help you manage pain when you use them the right way. But if you misuse them, they can cause serious harm and even death. For these reasons, doctors are very careful about how they prescribe opioids. If you decide to take opioids, here are some things to remember. · Keep your doctor informed. You can get addicted to opioids. The risk is higher if you have a history of substance use. Your doctor will monitor you closely for signs of misuse and addiction and to figure out when you no longer need to take opioids. · Make a treatment plan. The goal of your plan is to be able to function and do the things you need to do, even if you still have some pain. You might be able to manage your pain with other non-opioid options like physical therapy, relaxation, or over-the-counter pain medicines. · Be aware of the side effects. Opioids can cause serious side effects, such as constipation, dry mouth, and nausea. And over time, you may need a higher dose to get pain relief. This is called tolerance. Your body also gets used to opioids. This is called physical dependence. If you suddenly stop taking them, you may have withdrawal symptoms. The doctor carefully considered what pain medicine is right for you. You may not have received opioids if your doctor was concerned about drug interactions or your safety, or if he or she had other concerns. It is best to have one doctor or clinic treat your pain. This way you will get the pain medicine that will help you the most. And a doctor will be able to watch for any problems that the medicine might cause. The doctor has checked you carefully, but problems can develop later. If you notice any problems or new symptoms,  get medical treatment right away. Follow-up care is a key part of your treatment and safety. Be sure to make and go to all appointments, and call your doctor if you are having problems. It's also a good idea to know your test results and keep a list of the medicines you take. How can you care for yourself at home? · If you need to take opioids to manage your pain, remember these safety tips. ? Follow directions carefully. It's easy to misuse opioids if you take a dose other than what's prescribed by your doctor. This can lead to overdose and even death. Even sharing them with someone they weren't meant for is misuse. ? Be cautious. Opioids may affect your judgment and decision making. Do not drive or operate machinery until you can think clearly. Talk with your doctor about when it is safe to drive. ? Reduce the risk of drug interactions. Opioids can be dangerous if you take them with alcohol or with certain drugs like sleeping pills and muscle relaxers. Make sure your doctor knows about all the other medicines you take, including over-the-counter medicines. Don't start any new medicines before you talk to your doctor or pharmacist.  ? Keep others safe. Store opioids in a safe and secure place. Make sure that pets, children, friends, and family can't get to them. When you're done using opioids, make sure to properly dispose of them. You can either use a community drug take-back program or your drugstore's mail-back program. If one of these programs isn't available, you can flush opioid skin patches and unused opioid pills down the toilet. ? Reduce the risk of overdose. Misuse of opioids can be very dangerous.  Protect yourself by asking your doctor about a naloxone rescue kit. It can help you--and even save your life--if you take too much of an opioid. · Try other ways to reduce pain. ? Relax, and reduce stress. Relaxation techniques such as deep breathing or meditation can help. ? Keep moving. Gentle, daily exercise can help reduce pain over the long run. Try low- or no-impact exercises such as walking, swimming, and stationary biking. Do stretches to stay flexible. ? Try heat, cold packs, and massage. ? Get enough sleep. Pain can make you tired and drain your energy. Talk with your doctor if you have trouble sleeping because of pain. ? Think positive. Your thoughts can affect your pain level. Do things that you enjoy to distract yourself when you have pain instead of focusing on the pain. See a movie, read a book, listen to music, or spend time with a friend. · If you are not taking a prescription pain medicine, ask your doctor if you can take an over-the-counter medicine. When should you call for help? Call your doctor now or seek immediate medical care if:    · You have a new kind of pain.     · You have new symptoms, such as a fever or rash, along with the pain.    Watch closely for changes in your health, and be sure to contact your doctor if:    · You think you might be using too much pain medicine, and you need help to use less or stop.     · Your pain gets worse.     · You would like a referral to a doctor or clinic that specializes in pain management. Where can you learn more? Go to http://anna marie-manju.info/. Enter R108 in the search box to learn more about \"Safe Use of Opioid Pain Medicine: Care Instructions. \"  Current as of: September 10, 2017  Content Version: 11.8  © 3082-3254 [employer]. Care instructions adapted under license by [employer] (which disclaims liability or warranty for this information).  If you have questions about a medical condition or this instruction, always ask your healthcare professional. [place] [address] any warranty or liability for your use of this information.

## 2018-11-16 NOTE — PROGRESS NOTES
Nursing Notes    Patient presents to the office today in follow-up. Patient rates her pain at 5/10 on the numerical pain scale. Reviewed medications with counts as follows:    Rx Date filled Qty Dispensed Pill Count Last Dose Short   Fentanyl 50 mcg/hr - medication not brought to appt today. Pt was verbally counseled and she verbalized understanding. 4th occurrance 10/29/18 10 ? Current patch on right upper back    Oxycodone 10 mg -medication not brought to appt today. Pt was verbally counseled and she verbalized understanding. 4th occurrance 10/29/18 30 ? Yesterday afternoon                             Last opioid agreement 12/19/17  Last urine drug screen 08/24/18    Comments:     POC UDS was not performed in office today    Any new labs or imaging since last appointment? NO    Have you been to an emergency room (ER) or urgent care clinic since your last visit? NO            Have you been hospitalized since your last visit? NO     If yes, where, when, and reason for visit? Have you seen or consulted any other health care providers outside of the [address] [place] [address]  since your last visit? YES     If yes, where, when, and reason for visit? orthopedic  Ms. Satnam Briones has a reminder for a \"due or due soon\" health maintenance. I have asked that she contact her primary care provider for follow-up on this health maintenance.     PHQ over the last two weeks 11/16/2018   Little interest or pleasure in doing things Not at all   Feeling down, depressed, irritable, or hopeless Not at all   Total Score PHQ 2 0

## 2018-11-19 NOTE — PROGRESS NOTES
Referral date 7+ years ago, source ? ? and for chronic lower back pain. HPI:  Meghan Lino is a 71 y.o. female here for f/u visit for ongoing evaluation of chronic lower back pain due to lumbar DDD and OA. Pt was last seen here on 8/24/18. Pt reports waking up with new left foot numbness that gradually went away. She also reports worsening of tripping over her left foot when walking. Pain is located lower lumbar belt line region, neck, mid back, legs and feet. The pain is described as stabbing, aching, burning and numbness. She also has \"muslce spasms\" in her lower back left side. Pain at its best is 3/10. Pain at its worse is 8/10. The pain is worsened by driving, \"moving a certain way\", prolonged sitting, prolonged standing and prolonged walking. Symptoms are improved by frequent positional changes, sleep, elevating legs, Lidoderm patches, biofreeze topical cream, Zanaflex, tylenol, opioid medications, Gabapentin, heat and massage. Pt has tried PT and in the past with no perceived benefit. She has not tried aquatic PT, acupuncture, chiropractor, Cymbalta, TCA, Topamax, H-wave, Qutenza or NexWave E-stem device. She is no longer taking Ativan. Since last visit, she did not get nex-wave because it was not covered by her insurance. She did not get the capsaicin cream as recommended but she states Biofreeze works for her.       Social History     Socioeconomic History    Marital status:      Spouse name: Not on file    Number of children: Not on file    Years of education: Not on file    Highest education level: Not on file   Social Needs    Financial resource strain: Not on file    Food insecurity - worry: Not on file    Food insecurity - inability: Not on file    Transportation needs - medical: Not on file   [employer] needs - non-medical: Not on file   Occupational History    Not on file   Tobacco Use    Smoking status: Never Smoker    Smokeless tobacco: Never Used   Substance and Sexual Activity    Alcohol use: Not on file    Drug use: Not on file    Sexual activity: Yes     Birth control/protection: Surgical     Comment: BTL   Other Topics Concern    Not on file   Social History Narrative    Not on file     Family History   Problem Relation Age of Onset    Hypertension Mother     Heart Disease Father     Diabetes Neg Hx      Allergies   Allergen Reactions    Codeine Nausea Only    Nsaids (Non-Steroidal Anti-Inflammatory Drug) Diarrhea     Abdominal pain    Sulfa (Sulfonamide Antibiotics) Hives     Past Medical History:   Diagnosis Date    GERD (gastroesophageal reflux disease)     Hiatal hernia     Hypertension     Nausea     Osteoarthritis     Sacroiliitis (HCC)      Past Surgical History:   Procedure Laterality Date    HX APPENDECTOMY      HX CHOLECYSTECTOMY      HX GYN      uterine surgery    HX KNEE REPLACEMENT       Current Outpatient Medications on File Prior to Visit   Medication Sig    lidocaine (LIDODERM) 5 % 1 Patch by TransDERmal route every twenty-four (24) hours.  tiZANidine (ZANAFLEX) 4 mg tablet Take 1-2 Tabs by mouth three (3) times daily. As needed for muscle spasms    gabapentin (NEURONTIN) 600 mg tablet Take 2 Tabs by mouth three (3) times daily.  senna (SENNA) 8.6 mg tablet Take 1 Tab by mouth daily as needed for Constipation.  docusate sodium (COLACE) 100 mg capsule Take 1 Cap by mouth two (2) times daily as needed for Constipation.  lisinopril (PRINIVIL, ZESTRIL) 20 mg tablet Take 20 mg by mouth daily.  ESOMEPRAZOLE MAG TRIHYDRATE (NEXIUM PO) Take 40 mg by mouth daily.  naloxone 4 mg/actuation spry 4 mg by Nasal route once as needed (for opioid overdose) for up to 1 dose. Indications: OPIOID TOXICITY     No current facility-administered medications on file prior to visit.          ROS:  Denies fever, chills, nausea, vomiting, diarrhea, constipation, abdominal pain, chest pain, shortness or breath/trouble breathing, weakness, trouble swallowing, changes in vision, changes in hearing, falls, dizziness, bladder incontinence, bowel incontinence, depression, anxiety, suicidal ideations, homicidal ideations or alcohol use. Opioid specific risk: GERD, MAHAMED on CPAP      Vitals:    11/16/18 1319   BP: 161/81   Pulse: 63   Resp: 14   Temp: 97.6 °F (36.4 °C)   TempSrc: Oral   SpO2: 95%   Weight: 56.2 kg (124 lb)   Height: 5' 1\" (1.549 m)   PainSc:   5   PainLoc: Back          Physical exam:  AFVSS with elevated blood pressure noted, no acute distress, normal body habitus. A&OXs 3. Normocephalic, atraumatic. Conjugate gaze, clear sclerae. Speech is clear and appropriate. Mood is appropriate and patient is cooperative. Gait and balance are within functional limits. Non-labored breathing. LE:   Strength for right lower extremity is 5/5 for hip flexion, knee extension, dorsiflexion, extensor hallucis longus, plantar flexion. Strength for left lower extremity is 5/5 for hip flexion, knee extension, dorsiflexion, extensor hallucis longus, plantar flexion. Sensation to light touch is intact for right L2-S2. Sensation to light touch is intact for left L2-S2. Negative ankle clonus on the right and the left. Negative seated straight leg raise bilaterally. Full AROM lumbar flexion without reproduction of primary pain. Full AROM lumbar extension with reproduction of primary pain. Calculated MEQ - 135  Naloxone rescue - yes  Prophylactic bowel program - yes  Date of last OCA 8/24/18  Last UDS 8/24/28, not consistent (+) zolpidem metabolite 910 NG/mL, last prescription 12/5/17   date checked today, findings consistent    Primary Care Physician  Bethany Landeros 4  [phone]    Today   Last Visit  ORT - n/a  1  PGIC - 1 & 9  5 & 3  VIDA - 44%  54%  COMM - 2  6    PHQ -- .   PHQ over the last two weeks 11/16/2018   Little interest or pleasure in doing things Not at all   Feeling down, depressed, irritable, or hopeless Not at all   Total Score PHQ 2 0         Assessment/Plan:     ICD-10-CM ICD-9-CM    1. Chronic pain syndrome G89.4 338.4 fentaNYL (DURAGESIC) 12 mcg/hr patch      fentaNYL (DURAGESIC) 25 mcg/hr PATCH      oxyCODONE IR (ROXICODONE) 10 mg tab immediate release tablet      DISCONTINUED: oxyCODONE IR (ROXICODONE) 10 mg tab immediate release tablet   2. Sacroiliac joint pain M53.3 724.6 fentaNYL (DURAGESIC) 12 mcg/hr patch      fentaNYL (DURAGESIC) 25 mcg/hr PATCH      oxyCODONE IR (ROXICODONE) 10 mg tab immediate release tablet      DISCONTINUED: oxyCODONE IR (ROXICODONE) 10 mg tab immediate release tablet   3. Primary osteoarthritis of both knees M17.0 715.16 fentaNYL (DURAGESIC) 12 mcg/hr patch      fentaNYL (DURAGESIC) 25 mcg/hr PATCH      oxyCODONE IR (ROXICODONE) 10 mg tab immediate release tablet      DISCONTINUED: oxyCODONE IR (ROXICODONE) 10 mg tab immediate release tablet   4. Spasm of back muscles M62.830 724.8 fentaNYL (DURAGESIC) 12 mcg/hr patch      fentaNYL (DURAGESIC) 25 mcg/hr PATCH      oxyCODONE IR (ROXICODONE) 10 mg tab immediate release tablet      DISCONTINUED: oxyCODONE IR (ROXICODONE) 10 mg tab immediate release tablet   5. Sacroiliitis (HCC) M46.1 720.2 fentaNYL (DURAGESIC) 12 mcg/hr patch      fentaNYL (DURAGESIC) 25 mcg/hr PATCH      oxyCODONE IR (ROXICODONE) 10 mg tab immediate release tablet      DISCONTINUED: oxyCODONE IR (ROXICODONE) 10 mg tab immediate release tablet   6. DDD (degenerative disc disease), lumbar M51.36 722.52 fentaNYL (DURAGESIC) 12 mcg/hr patch      fentaNYL (DURAGESIC) 25 mcg/hr PATCH      oxyCODONE IR (ROXICODONE) 10 mg tab immediate release tablet      DISCONTINUED: oxyCODONE IR (ROXICODONE) 10 mg tab immediate release tablet   7.  Lumbosacral spondylosis without myelopathy M47.817 721.3 fentaNYL (DURAGESIC) 12 mcg/hr patch      fentaNYL (DURAGESIC) 25 mcg/hr PATCH      oxyCODONE IR (ROXICODONE) 10 mg tab immediate release tablet DISCONTINUED: oxyCODONE IR (ROXICODONE) 10 mg tab immediate release tablet   8. Chronic bilateral low back pain without sciatica M54.5 724.2 fentaNYL (DURAGESIC) 12 mcg/hr patch    G89.29 338.29 fentaNYL (DURAGESIC) 25 mcg/hr PATCH      oxyCODONE IR (ROXICODONE) 10 mg tab immediate release tablet      DISCONTINUED: oxyCODONE IR (ROXICODONE) 10 mg tab immediate release tablet   9. Pain in joint, multiple sites M25.50 719.49    10. Enthesopathy of hip region on both sides M76.891 726.5     M76.892     11. Chronic midline thoracic back pain M54.6 724.1     G89.29 338.29         Do not recommend long term opioid therapy for this patient at this time. Pt currently taking  fentanyl 50mcg/hr patch changed every 72 hours and oxycodone IR 10mg daily as needed. Their MME is 135. Today, we will continue the weaning of patients opioid medication with a goal of being opioid free, pending safety and compliance. Pt instructed to call if they experience any signs of withdrawal (diarrhea, nausea, vomiting, sweating or chills, agitation, itching). Today, pt given prescription for Fentanyl 25mcg/hr patch and Fentanyl 12mcg/hr patch both worn at the same time and changed every 3 days and oxycodone IR 10mg up to 2 times a day as needed with a total of 60 tabs to be used over 30 days. Their new MME is 120. Will address short-acting opioid once extended release has been discontinued. Pt instructed to call 5-7 days before they run out of their medications for refill. At next office visit, the plan is to provide patient with Fentanyl 25mcg/hr patch changed every 3 days and oxycodone IR 10mg up to 2 times a day as needed with a total of 60 tabs to be used over 30 days. Their new MME will be 90. If patient has difficulty with the wean or difficulty with cravings we will consider referral to mental health for ongoing assessment and treatment for opioid use disorder.     Continue Lidoderm patches, Gabapentin and Zanaflex as previously recommended. Pt would benefit from aquatic PT, acupuncture therapy, H-wave and/or Qutenza patch in the future. In the future consider tylenol optimization. Follow up ongoing assessment and ongoing development of integrative and comprehensive plan of care for chronic pain. Goals: To establish complementary and integrative plan of care to address chronic pain issues while minimizing pharmaceuticals to maximize patient's function improve quality of life. Education:  Patient again educated on the importance of strict compliance with the opioid care agreement while on opioid therapy. Patient also again educated that they should avoid driving while on chronic opioid therapy. Also advised to avoid alcohol and to avoid benzodiazepines while on opioid therapy. Handouts given regarding opioid safety. Follow-up Disposition:  Return in about 3 months (around 2/16/2019) for 30 min. [address] was used for portions of this report. Unintended errors may occur.

## 2018-12-03 ENCOUNTER — TELEPHONE (OUTPATIENT)
Dept: PAIN MANAGEMENT | Age: 73
End: 2018-12-03

## 2019-01-02 DIAGNOSIS — M47.817 LUMBOSACRAL SPONDYLOSIS WITHOUT MYELOPATHY: ICD-10-CM

## 2019-01-02 DIAGNOSIS — G89.4 CHRONIC PAIN SYNDROME: ICD-10-CM

## 2019-01-02 DIAGNOSIS — M17.0 PRIMARY OSTEOARTHRITIS OF BOTH KNEES: ICD-10-CM

## 2019-01-02 DIAGNOSIS — M62.830 SPASM OF BACK MUSCLES: ICD-10-CM

## 2019-01-02 DIAGNOSIS — G56.21 ULNAR NEUROPATHY OF RIGHT UPPER EXTREMITY: ICD-10-CM

## 2019-01-02 DIAGNOSIS — M46.1 SACROILIITIS (HCC): ICD-10-CM

## 2019-01-02 DIAGNOSIS — G89.29 CHRONIC BILATERAL LOW BACK PAIN WITHOUT SCIATICA: ICD-10-CM

## 2019-01-02 DIAGNOSIS — M53.3 SACROILIAC JOINT PAIN: ICD-10-CM

## 2019-01-02 DIAGNOSIS — M54.50 CHRONIC BILATERAL LOW BACK PAIN WITHOUT SCIATICA: ICD-10-CM

## 2019-01-02 DIAGNOSIS — M51.36 DDD (DEGENERATIVE DISC DISEASE), LUMBAR: ICD-10-CM

## 2019-01-02 RX ORDER — FENTANYL 12.5 UG/1
1 PATCH TRANSDERMAL
Qty: 10 PATCH | Refills: 0 | Status: SHIPPED | OUTPATIENT
Start: 2019-01-02 | End: 2019-02-04 | Stop reason: SDUPTHER

## 2019-01-02 RX ORDER — OXYCODONE HYDROCHLORIDE 10 MG/1
10 TABLET ORAL
Qty: 60 TAB | Refills: 0 | Status: SHIPPED | OUTPATIENT
Start: 2019-01-02 | End: 2019-02-04 | Stop reason: SDUPTHER

## 2019-01-02 RX ORDER — FENTANYL 25 UG/1
1 PATCH TRANSDERMAL
Qty: 10 PATCH | Refills: 0 | Status: SHIPPED | OUTPATIENT
Start: 2019-01-02 | End: 2019-02-04 | Stop reason: SDUPTHER

## 2019-01-02 NOTE — TELEPHONE ENCOUNTER
Justina Solitario has called requesting a refill of their controlled medication, Fentanyl 37 mcg/hr patch and Roxicodone 10 mg tab, for the management of chronic LBP. Last office visit date: 11/16/18 with Kait and has a f/u appt 2/12/19 with Michael Ahuja. Last opioid care agreement 8/24/18  Last UDS was done 8/24/18    Date last  was pulled and reviewed : 1/2/19. Last fill date: 12/1/18 and 12/3/18    Was the patient compliant when the above report was pulled? Patient filled Lunesta from another provider on 12/24/18    Analgesia: Patient reports at least 50% pain relief on current regimen. Aberrancies: No aberrancies noted in the last 30 days. (Last FYI 11/16/18)    ADL's: Patient states they are able to perform ADL's on current regimen. Adverse Reaction: Patient reports no adverse reactions at this time. Provider's last note and plan of care reviewed? yes  Request forwarded to provider for review.

## 2019-02-04 DIAGNOSIS — G89.4 CHRONIC PAIN SYNDROME: ICD-10-CM

## 2019-02-04 DIAGNOSIS — M47.817 LUMBOSACRAL SPONDYLOSIS WITHOUT MYELOPATHY: ICD-10-CM

## 2019-02-04 DIAGNOSIS — M51.36 DDD (DEGENERATIVE DISC DISEASE), LUMBAR: ICD-10-CM

## 2019-02-04 DIAGNOSIS — M54.50 CHRONIC BILATERAL LOW BACK PAIN WITHOUT SCIATICA: ICD-10-CM

## 2019-02-04 DIAGNOSIS — G89.29 CHRONIC BILATERAL LOW BACK PAIN WITHOUT SCIATICA: ICD-10-CM

## 2019-02-04 DIAGNOSIS — M62.830 SPASM OF BACK MUSCLES: ICD-10-CM

## 2019-02-04 DIAGNOSIS — M53.3 SACROILIAC JOINT PAIN: ICD-10-CM

## 2019-02-04 DIAGNOSIS — M17.0 PRIMARY OSTEOARTHRITIS OF BOTH KNEES: ICD-10-CM

## 2019-02-04 DIAGNOSIS — M46.1 SACROILIITIS (HCC): ICD-10-CM

## 2019-02-04 NOTE — TELEPHONE ENCOUNTER
Sundeep Dear has called requesting a refill of their controlled medication, Fentanyl 37 mcg/hr patch and Roxicodone 10 mg tab, for the management of chronic LBP. Last office visit date: 11/16/18 with Kait and has a f/u appt 2/12/19 with Jade Garcia. Last opioid care agreement 8/24/18  Last UDS was done 8/24/18    Date last  was pulled and reviewed : 2/4/19 (will run out before appt). Last fill date for medication was 1/4/19. Was the patient compliant when the above report was pulled? Patient filled Lunesta from another provider on 1/29/19. Analgesia: Patient reports at least 50% pain relief on current regimen. Aberrancies: No aberrancies noted in the last 30 days. ADL's: Patient states they are able to perform ADL's on current regimen. Adverse Reaction: Patient reports no adverse reactions at this time. Provider's last note and plan of care reviewed? yes  Request forwarded to provider for review.

## 2019-02-05 RX ORDER — OXYCODONE HYDROCHLORIDE 10 MG/1
10 TABLET ORAL
Qty: 14 TAB | Refills: 0 | Status: SHIPPED | OUTPATIENT
Start: 2019-02-05 | End: 2019-02-12 | Stop reason: SDUPTHER

## 2019-02-05 RX ORDER — OXYCODONE HYDROCHLORIDE 10 MG/1
10 TABLET ORAL
Qty: 60 TAB | Refills: 0 | Status: SHIPPED | OUTPATIENT
Start: 2019-02-05 | End: 2019-02-05

## 2019-02-05 RX ORDER — FENTANYL 12.5 UG/1
1 PATCH TRANSDERMAL
Qty: 10 PATCH | Refills: 0 | Status: SHIPPED | OUTPATIENT
Start: 2019-02-05 | End: 2019-03-07

## 2019-02-05 RX ORDER — FENTANYL 25 UG/1
1 PATCH TRANSDERMAL
Qty: 10 PATCH | Refills: 0 | Status: SHIPPED | OUTPATIENT
Start: 2019-02-05 | End: 2019-03-07 | Stop reason: SDUPTHER

## 2019-02-05 NOTE — TELEPHONE ENCOUNTER
Pts appointment with Dr Mamta Peacock is on 2/12/19.  Will provide bridge to allow her to get to this appointment

## 2019-02-05 NOTE — TELEPHONE ENCOUNTER
Patient identified using two patient identifiers (name and ); patient advised prescriptions are ready for pick-up. Patient informed of the Bridge for the Roxicodone, but the Fentanyl for the full amount (d/t difficulties breaking boxes up), and reminded patient to keep their appointment next week. Patient verbalized understanding.

## 2019-02-12 ENCOUNTER — OFFICE VISIT (OUTPATIENT)
Dept: PAIN MANAGEMENT | Age: 74
End: 2019-02-12

## 2019-02-12 VITALS
HEIGHT: 61 IN | WEIGHT: 130 LBS | BODY MASS INDEX: 24.55 KG/M2 | HEART RATE: 76 BPM | RESPIRATION RATE: 16 BRPM | OXYGEN SATURATION: 98 % | SYSTOLIC BLOOD PRESSURE: 163 MMHG | TEMPERATURE: 96.5 F | DIASTOLIC BLOOD PRESSURE: 80 MMHG

## 2019-02-12 DIAGNOSIS — M47.817 LUMBOSACRAL SPONDYLOSIS WITHOUT MYELOPATHY: ICD-10-CM

## 2019-02-12 DIAGNOSIS — M53.3 SACROILIAC JOINT PAIN: Primary | ICD-10-CM

## 2019-02-12 DIAGNOSIS — G89.4 CHRONIC PAIN SYNDROME: ICD-10-CM

## 2019-02-12 DIAGNOSIS — Z79.899 ENCOUNTER FOR LONG-TERM (CURRENT) USE OF HIGH-RISK MEDICATION: ICD-10-CM

## 2019-02-12 DIAGNOSIS — G89.29 CHRONIC BILATERAL LOW BACK PAIN WITHOUT SCIATICA: ICD-10-CM

## 2019-02-12 DIAGNOSIS — M17.0 PRIMARY OSTEOARTHRITIS OF BOTH KNEES: ICD-10-CM

## 2019-02-12 DIAGNOSIS — M62.830 SPASM OF BACK MUSCLES: ICD-10-CM

## 2019-02-12 DIAGNOSIS — M54.50 CHRONIC BILATERAL LOW BACK PAIN WITHOUT SCIATICA: ICD-10-CM

## 2019-02-12 DIAGNOSIS — M51.36 DDD (DEGENERATIVE DISC DISEASE), LUMBAR: ICD-10-CM

## 2019-02-12 DIAGNOSIS — M46.1 SACROILIITIS (HCC): ICD-10-CM

## 2019-02-12 RX ORDER — OXYCODONE HYDROCHLORIDE 10 MG/1
10 TABLET ORAL
Qty: 60 TAB | Refills: 0 | Status: SHIPPED | OUTPATIENT
Start: 2019-02-22 | End: 2019-03-24

## 2019-02-12 RX ORDER — HYDROCHLOROTHIAZIDE 25 MG/1
25 TABLET ORAL DAILY
COMMUNITY

## 2019-02-12 NOTE — PROGRESS NOTES
Nursing Notes Patient presents to the office today in follow-up. Patient rates her pain at 6/10 on the numerical pain scale. Reviewed medications with counts as follows:   
Rx Date filled Qty Dispensed Pill Count Last Dose Short Oxycodone 10 mg  02/06/19 14 14 today no Fentanyl 25 mcg/hr 02/06/19 10 8+1 on  Current patch on back per pt no Fentanyl 12 mcg/hr  02/06/19 10 8+1 on  Current patch on back per pt no Oxycodone 10 mg  01/04/19 60 17 today no Last opioid agreement 08/24/18 Last urine drug screen 08/24/18 Comments: POC UDS was not performed in office today. Any new labs or imaging since last appointment? NO Have you been to an emergency room (ER) or urgent care clinic since your last visit? NO Have you been hospitalized since your last visit? NO If yes, where, when, and reason for visit? Have you seen or consulted any other health care providers outside of the [address] [place] [address]  since your last visit? YES If yes, where, when, and reason for visit? [facility], physical therapy Ms. Kiara Murillo has a reminder for a \"due or due soon\" health maintenance. I have asked that she contact her primary care provider for follow-up on this health maintenance. 3 most recent PHQ Screens 2/12/2019 Little interest or pleasure in doing things Not at all Feeling down, depressed, irritable, or hopeless Not at all Total Score PHQ 2 0

## 2019-02-12 NOTE — PROGRESS NOTES
Referred prior to 2011 for low back pain Pt was last seen here on November 16, 2018 Calculated MEQ -118 Naloxone rescue -yes Prophylactic bowel program -yes Date of last OCA August 2018. Last UDS August 24th 2018, findings consistent with evidence of undisclosed Ambien Urine drug screen repeated today, point-of-care result was consistent. , date checked  today, findings were consistent. GIC-1 and 8 VIDA -46% COMM-3 
 
HPI: 
Delmy Hoyos is a 68 y.o. female with hx of thoracolumbar scoliosis here for f/u visit for ongoing evaluation of LBP. Pt denies interval changes in the character or distribution of pain. Pain is located as a sharp pain along the left lumbar paraspinals and an aching pain at the lumbosacral belt line. She also has radiating aching pain down posterior thighs stopping at the knees. The pain ranges from 3-6/10. In the past she has had trigger point injections, lumbar facet injections, and SIJ bilat. most recently she had bilateral sacroiliac joint injections done with her orthopedic team.  Benefit from the corticosteroid injections into the bilateral sacroiliac joints done recently but reports that of all the interventions she is done through the years the thing that has helped the most has been the bilateral radiofrequency ablation of the sacral lateral nerves which reportedly gave greater than 50% relief of her symptoms for more than 8 months. --She continues to follow with orthopedics for left knee, left ankle/foot, lumbar spine. She states that she was offered thoracolumbar fusion surgery to prevent progression of the scoliotic curves. She also states she was offered a left ankle foot fusion. She states that she is trying to avoid surgeries. --She has a TLSO which provides good support and improved function with standing activities but she tolerates it poorly with sitting activities. --Gabapentin 1200 milligrams 3 times daily, helpful  Lidoderm patches, helpful.  Tizanidine 4 mg 1-2 tabs as needed up to 3 times daily, helpful. --Oxycodone immediate release 10 mg up to twice daily as needed for pain. Fentanyl transdermal patch 37 mcg/h every 3 days. She has tolerated the recent opioid reduction without difficulty. Pt reports partial but incomplete analgesia with the current opioid regimen which helps to improve activity tolerance and function. Pt denies aberrant behaviors or any adverse effects. ROS:Review of systems is negative for fever, chills, nausea, vomiting, diarrhea, constipation, chest pain, shortness of breath, abdominal pain, weakness, trouble swallowing, acute changes in vision, acute changes in hearing, falls, dizziness, bladder incontinence, bowel incontinence, depression, anxiety, suicidal ideation, homicidal ideation, alcohol use. Review of systems positive for ongoing back pain and left foot and ankle pain. Opioid specific risk: Concurrent benzodiazepine use, obstructive sleep apnea, GERD Imaging: lumbar MRI report from 4/17/17 showed, \"\"\"\"\"\"\"\"\"\"\"IMPRESSION: 1.  Levoconvex rotary scoliosis of the upper lumbar spine with associated multilevel spondylosis or spondylolisthesis, similar to the prior examination without acute findings. 2.  There is new degenerative type I Modic endplate edema at F1-W1 which can be a source of pain. 3.  Moderate asymmetric foraminal stenosis on the right at T12-L1 and L1-L2 as well as L2-L3 and to a lesser extent in the left at L5-S1.  Otherwise, no significant canal or foraminal stenosis is appreciated. \"\"\"\"\"\"\"\"\"\"\"\"\"\" Vitals:  
 02/12/19 1044 BP: 163/80 Pulse: 76 Resp: 16 Temp: 96.5 °F (35.8 °C) TempSrc: Oral  
SpO2: 98% Weight: 59 kg (130 lb) Height: 5' 1\" (1.549 m) PainSc:   6 PE: 
AFVSS, no acute distress, normal body habitus. A&OXs 3. 
normocephalic, atraumatic. Conjugate gaze, clear sclerae. Speech is clear and appropriate. Mood is pleasant and appropriate. Patient is cooperative. Significant left paraspinal prominence noted from mid lumbar up to the lower thoracic region. Thoracolumbar scoliotic curve noted approximately L2-L3 Strength for right lower extremity is 5/5 for knee extension, dorsiflexion, extensor hallucis longus, plantar flexion. 5/5 for ankle inversion and ankle eversion Strength for left lower extremity is 5/5 for  knee extension, dorsiflexion, extensor hallucis longus, plantar flexion, 4/5 for ankle inversion and ankle Eversion Negative seated straight leg raise bilaterally. positive FABERs on the right and negative on the left. Gait is within functional limits with antalgia. Balance is within functional limits. Sensation to light touch is intact for right L2-S2 and left L2-S2. Bilateral pes planus. Primary Care Physician Braxton Chua [facility] [address] [facility] [address] 
[phone] PHQ -- . 
3 most recent PHQ Screens 2/12/2019 Little interest or pleasure in doing things Not at all Feeling down, depressed, irritable, or hopeless Not at all Total Score PHQ 2 0 Assessment/Plan: ICD-10-CM ICD-9-CM 1. Sacroiliac joint pain M53.3 724.6 oxyCODONE IR (ROXICODONE) 10 mg tab immediate release tablet 2. Chronic pain syndrome G89.4 338.4 3. Spasm of back muscles M62.830 724.8 4. Sacroiliitis (HCC) M46.1 720.2 oxyCODONE IR (ROXICODONE) 10 mg tab immediate release tablet 5. Primary osteoarthritis of both knees M17.0 715.16   
6. Lumbosacral spondylosis without myelopathy M47.817 721.3 oxyCODONE IR (ROXICODONE) 10 mg tab immediate release tablet 7. Chronic bilateral low back pain without sciatica M54.5 724.2 oxyCODONE IR (ROXICODONE) 10 mg tab immediate release tablet G89.29 338.29   
8. Encounter for long-term (current) use of high-risk medication Z79.899 V58.69 DRUG SCREEN  
   AMB POC DRUG SCREEN () 9. DDD (degenerative disc disease), lumbar M51.36 722.52 oxyCODONE IR (ROXICODONE) 10 mg tab immediate release tablet --I do not recommend long-term opioid therapy for this person's chronic pain. I believe the risks outweigh any potential benefits. We will progress with a gradual opioid wean. Patient was educated on signs and symptoms of opioid withdrawal and advised to call the clinic should these symptoms arise so that we may provide support as needed. --At time of March refill provide patient with fentanyl transdermal 25 mcg/h applied every 3 days with 10 patches given for 30 days. Provide the same rate at April refill. And may refill change to fentanyl 12 mcg every 3 days. --Patient's refill dates for the short acting opioid have become asynchronous. We will provide patient with oxycodone immediate release 10 mg used up to twice daily as needed for pain with 60 tablets provided over 30 days with the earliest fill date being February 22, 2018. Continue this rate until fentanyl has been discontinued. Later, consider decreasing to 5 mg tablets and allowing more frequent dosing up to 4 times daily. --Patient only recently had bilateral sacroiliac joint injections done with steroid through her orthopedic team.  She is still feeling benefit from this. She reports that the previous fixation of the sacral lateral branches provided the best relief for her of the anything she is done from which she reports greater than 50% relief of her pain for 8 months or longer at a time bilaterally. When needed, we will consider repeating this procedure. We will also continue to assess the back pain related to the scoliosis. The convex side of the curve is quite often more stressful for the facet joints so, if needed we will pursue a trial of medial branch block/RF at the inside apex of the curve.  
--Patient has been offered surgery to stabilize the scoliotic curve and also ankle fusion on the left to stabilize the right weakened ankle. I have asked her to inquire with her orthopedic team about the possibility of a custom molded left AFO to provide stability to help her avoid surgery. --Continue gabapentin, Lidoderm GOALS: 
To establish complementary and integrative plan of care to address chronic pain issues while minimizing pharmaceuticals to maximize patient's function improve quality of life. Education: 
Patient again educated on the importance of strict compliance with the opioid care agreement while on opioid therapy. Patient also again educated that they should avoid driving while on chronic opioid therapy. Also advised to avoid alcohol and to avoid benzodiazepines while on opioid therapy. A total of 32 minutes were spent with the patient, of which more than half of the time was spent counseling and/or coordinating care. F/u:. Follow-up Disposition: 
Return in about 3 months (around 5/12/2019) for 30 min. Social History Socioeconomic History  Marital status:  Spouse name: Not on file  Number of children: Not on file  Years of education: Not on file  Highest education level: Not on file Social Needs  Financial resource strain: Not on file  Food insecurity - worry: Not on file  Food insecurity - inability: Not on file  Transportation needs - medical: Not on file  Transportation needs - non-medical: Not on file Occupational History  Not on file Tobacco Use  Smoking status: Never Smoker  Smokeless tobacco: Never Used Substance and Sexual Activity  Alcohol use: Not on file  Drug use: Not on file  Sexual activity: Yes Birth control/protection: Surgical  
  Comment: BTL Other Topics Concern  Not on file Social History Narrative  Not on file Family History Problem Relation Age of Onset  Hypertension Mother  Heart Disease Father  Diabetes Neg Hx Allergies Allergen Reactions  Codeine Nausea Only  Nsaids (Non-Steroidal Anti-Inflammatory Drug) Diarrhea Abdominal pain  Sulfa (Sulfonamide Antibiotics) Hives Past Medical History:  
Diagnosis Date  GERD (gastroesophageal reflux disease)  Hiatal hernia  Hypertension  Nausea  Osteoarthritis  Sacroiliitis ([facility] [address] Past Surgical History:  
Procedure Laterality Date  HX APPENDECTOMY  HX CHOLECYSTECTOMY  HX GYN    
 uterine surgery  HX KNEE REPLACEMENT Current Outpatient Medications on File Prior to Visit Medication Sig  
 hydroCHLOROthiazide (HYDRODIURIL) 25 mg tablet Take 25 mg by mouth daily.  fentaNYL (DURAGESIC) 12 mcg/hr patch 1 Patch by TransDERmal route every seventy-two (72) hours for 30 days. Max Daily Amount: 1 Patch.  fentaNYL (DURAGESIC) 25 mcg/hr PATCH 1 Patch by TransDERmal route every seventy-two (72) hours for 30 days. Max Daily Amount: 1 Patch.  lidocaine (LIDODERM) 5 % 1 Patch by TransDERmal route every twenty-four (24) hours.  tiZANidine (ZANAFLEX) 4 mg tablet Take 1-2 Tabs by mouth three (3) times daily. As needed for muscle spasms  gabapentin (NEURONTIN) 600 mg tablet Take 2 Tabs by mouth three (3) times daily.  senna (SENNA) 8.6 mg tablet Take 1 Tab by mouth daily as needed for Constipation.  docusate sodium (COLACE) 100 mg capsule Take 1 Cap by mouth two (2) times daily as needed for Constipation.  lisinopril (PRINIVIL, ZESTRIL) 20 mg tablet Take 40 mg by mouth daily.  ESOMEPRAZOLE MAG TRIHYDRATE (NEXIUM PO) Take 40 mg by mouth daily.  naloxone 4 mg/actuation spry 4 mg by Nasal route once as needed (for opioid overdose) for up to 1 dose. Indications: OPIOID TOXICITY No current facility-administered medications on file prior to visit.

## 2019-02-12 NOTE — PATIENT INSTRUCTIONS
Piriformis Syndrome: Exercises Your Care Instructions Here are some examples of typical rehabilitation exercises for your condition. Start each exercise slowly. Ease off the exercise if you start to have pain. Your doctor or physical therapist will tell you when you can start these exercises and which ones will work best for you. How to do the exercises Hip rotator stretch 1. Lie on your back with both knees bent and your feet flat on the floor. 2. Put the ankle of your affected leg on your opposite thigh near your knee. 3. Use your hand to gently push your knee (on your affected leg) away from your body until you feel a gentle stretch around your hip. 4. Hold the stretch for 15 to 30 seconds. 5. Repeat 2 to 4 times. 6. Switch legs and repeat steps 1 through 5. Piriformis stretch 1. Lie on your back with your legs straight. 2. Lift your affected leg and bend your knee. With your opposite hand, reach across your body, and then gently pull your knee toward your opposite shoulder. 3. Hold the stretch for 15 to 30 seconds. 4. Repeat with your other leg. 5. Repeat 2 to 4 times on each side. Lower abdominal strengthening 1. Lie on your back with your knees bent and your feet flat on the floor. 2. Tighten your belly muscles by pulling your belly button in toward your spine. 3. Lift one foot off the floor and bring your knee toward your chest, so that your knee is straight above your hip and your leg is bent like the letter \"L. \" 
4. Lift the other knee up to the same position. 5. Lower one leg at a time to the starting position. 6. Keep alternating legs until you have lifted each leg 8 to 12 times. 7. Be sure to keep your belly muscles tight and your back still as you are moving your legs. Be sure to breathe normally. Follow-up care is a key part of your treatment and safety.  Be sure to make and go to all appointments, and call your doctor if you are having problems. It's also a good idea to know your test results and keep a list of the medicines you take. Current as of: September 20, 2018 Content Version: 11.9 © 2704-3784 [employer]. Care instructions adapted under license by [employer] (which disclaims liability or warranty for this information). If you have questions about a medical condition or this instruction, always ask your healthcare professional. [place] [address] any warranty or liability for your use of this information.

## 2019-03-07 DIAGNOSIS — M46.1 SACROILIITIS (HCC): ICD-10-CM

## 2019-03-07 DIAGNOSIS — M17.0 PRIMARY OSTEOARTHRITIS OF BOTH KNEES: ICD-10-CM

## 2019-03-07 DIAGNOSIS — M47.817 LUMBOSACRAL SPONDYLOSIS WITHOUT MYELOPATHY: ICD-10-CM

## 2019-03-07 DIAGNOSIS — M51.36 DDD (DEGENERATIVE DISC DISEASE), LUMBAR: ICD-10-CM

## 2019-03-07 DIAGNOSIS — M54.50 CHRONIC BILATERAL LOW BACK PAIN WITHOUT SCIATICA: ICD-10-CM

## 2019-03-07 DIAGNOSIS — M62.830 SPASM OF BACK MUSCLES: ICD-10-CM

## 2019-03-07 DIAGNOSIS — G89.4 CHRONIC PAIN SYNDROME: ICD-10-CM

## 2019-03-07 DIAGNOSIS — M53.3 SACROILIAC JOINT PAIN: ICD-10-CM

## 2019-03-07 DIAGNOSIS — G89.29 CHRONIC BILATERAL LOW BACK PAIN WITHOUT SCIATICA: ICD-10-CM

## 2019-03-07 RX ORDER — FENTANYL 25 UG/1
1 PATCH TRANSDERMAL
Qty: 10 PATCH | Refills: 0 | Status: SHIPPED | OUTPATIENT
Start: 2019-03-07 | End: 2019-03-28 | Stop reason: SDUPTHER

## 2019-03-07 NOTE — TELEPHONE ENCOUNTER
Sania Flowers has called requesting a refill of their controlled medication, Fentanyl, for the management of chronic pain. Last office visit date: 2/12/19  Last opioid care agreement 8/18  Last UDS was done 2/19    Date last  was pulled and reviewed : 3/7/19  Last fill date for medication was 2/6/19    Was the patient compliant when the above report was pulled? Yes filled Lunesta for sleep    Analgesia: Patient reports 50% pain relief on current regimen. Aberrancies: No aberrancies noted in the last 30 days. ADL's: Patient states they are able to perform ADL's on current regimen. Adverse Reaction: Patient reports no adverse reactions at this time. Provider's last note and plan of care reviewed? yes  Request forwarded to provider for review.

## 2019-03-28 DIAGNOSIS — M53.3 SACROILIAC JOINT PAIN: ICD-10-CM

## 2019-03-28 DIAGNOSIS — M47.817 LUMBOSACRAL SPONDYLOSIS WITHOUT MYELOPATHY: ICD-10-CM

## 2019-03-28 DIAGNOSIS — M17.0 PRIMARY OSTEOARTHRITIS OF BOTH KNEES: ICD-10-CM

## 2019-03-28 DIAGNOSIS — M46.1 SACROILIITIS (HCC): ICD-10-CM

## 2019-03-28 NOTE — TELEPHONE ENCOUNTER
Alejandra Teresa has called requesting a refill of their controlled medication, Fentanyl and Oxycodone, for the management of chronic pain. Last office visit date: 2/12/19  Last opioid care agreement 8/18  Last UDS was done 2/12/19    Date last  was pulled and reviewed : 3/28/19  Last fill date for medication was 3/8/19 and 2/27/19    Was the patient compliant when the above report was pulled? Yes filled Lunesta 3 mg # 30 on 2/27/19    Analgesia: Patient reports 50% pain relief on current regimen. Aberrancies: No aberrancies noted in the last 30 days. ADL's: Patient states they are able to perform ADL's on current regimen. Patient states she needs help with cooking and cleaning      Adverse Reaction: Patient reports no adverse reactions at this time. Provider's last note and plan of care reviewed? yes  Request forwarded to provider for review.

## 2019-03-29 RX ORDER — FENTANYL 25 UG/1
1 PATCH TRANSDERMAL
Qty: 10 PATCH | Refills: 0 | Status: SHIPPED | OUTPATIENT
Start: 2019-04-06 | End: 2019-05-07 | Stop reason: DRUGHIGH

## 2019-03-29 RX ORDER — OXYCODONE HYDROCHLORIDE 10 MG/1
10 TABLET ORAL
Qty: 60 TAB | Refills: 0 | Status: SHIPPED | OUTPATIENT
Start: 2019-03-29 | End: 2019-04-28

## 2019-03-29 NOTE — TELEPHONE ENCOUNTER
10:51 AM I lvm for the patient to call back, phone number provided. This call was to notify the patient that her Rx's for Fentanyl and Oxycodone are ready for .

## 2019-05-01 DIAGNOSIS — M46.1 SACROILIITIS (HCC): ICD-10-CM

## 2019-05-01 DIAGNOSIS — M53.3 SACROILIAC JOINT PAIN: ICD-10-CM

## 2019-05-01 DIAGNOSIS — M17.0 PRIMARY OSTEOARTHRITIS OF BOTH KNEES: ICD-10-CM

## 2019-05-01 DIAGNOSIS — M47.817 LUMBOSACRAL SPONDYLOSIS WITHOUT MYELOPATHY: ICD-10-CM

## 2019-05-01 RX ORDER — OXYCODONE HYDROCHLORIDE 10 MG/1
10 TABLET ORAL 2 TIMES DAILY
Qty: 60 TAB | Refills: 0 | Status: SHIPPED | OUTPATIENT
Start: 2019-05-01 | End: 2019-05-31

## 2019-05-01 RX ORDER — OXYCODONE HYDROCHLORIDE 10 MG/1
10 TABLET ORAL 2 TIMES DAILY
COMMUNITY
End: 2019-05-01 | Stop reason: SDUPTHER

## 2019-05-01 RX ORDER — FENTANYL 12.5 UG/1
1 PATCH TRANSDERMAL
Qty: 10 PATCH | Refills: 0 | Status: SHIPPED | OUTPATIENT
Start: 2019-05-05 | End: 2019-05-07 | Stop reason: DRUGHIGH

## 2019-05-01 NOTE — TELEPHONE ENCOUNTER
Yasir Perez has called requesting a refill of their controlled medication Fentanyl band Oxycodone  for the management of chronic back pain      Last office visit date: 02/12/19  Last opioid care agreement 09/24/19  Last UDS was done 02/12/19    Date last  was pulled and reviewed : 05/01/19  Last fill date for medication was 04/01/19 for Oxycodone and 04/05/19 for Fentanyl     Was the patient compliant when the above report was pulled? yes    Analgesia: The patient states that she receives 50% of pain relief from the medication     Aberrancies: There are no recent aberrancies noted at this time    ADL's: The patient states that she is able to perform her adls while on the medication     Adverse Reaction: There are no adverse reactions noted at this time     Provider's last note and plan of care reviewed? yes  Request forwarded to provider for review.

## 2019-05-01 NOTE — TELEPHONE ENCOUNTER
Reviewed. OK to distribute prescription. ensure only one script is available for the oxycodone. Thank you.

## 2019-05-02 NOTE — TELEPHONE ENCOUNTER
02:44 pm The patient was notified that her R's for Oxycodone and Fentanyl are ready for  no later than 03:45 pm on any given day.

## 2019-05-07 ENCOUNTER — OFFICE VISIT (OUTPATIENT)
Dept: PAIN MANAGEMENT | Age: 74
End: 2019-05-07

## 2019-05-07 VITALS
HEART RATE: 64 BPM | BODY MASS INDEX: 24.55 KG/M2 | TEMPERATURE: 97.1 F | HEIGHT: 61 IN | WEIGHT: 130 LBS | SYSTOLIC BLOOD PRESSURE: 132 MMHG | DIASTOLIC BLOOD PRESSURE: 73 MMHG | OXYGEN SATURATION: 98 % | RESPIRATION RATE: 14 BRPM

## 2019-05-07 DIAGNOSIS — G89.4 CHRONIC PAIN SYNDROME: ICD-10-CM

## 2019-05-07 DIAGNOSIS — M51.36 DDD (DEGENERATIVE DISC DISEASE), LUMBAR: ICD-10-CM

## 2019-05-07 DIAGNOSIS — M62.830 SPASM OF BACK MUSCLES: ICD-10-CM

## 2019-05-07 DIAGNOSIS — M46.1 SACROILIITIS (HCC): ICD-10-CM

## 2019-05-07 DIAGNOSIS — Z79.899 ENCOUNTER FOR LONG-TERM (CURRENT) USE OF HIGH-RISK MEDICATION: ICD-10-CM

## 2019-05-07 DIAGNOSIS — G89.29 CHRONIC BILATERAL LOW BACK PAIN WITHOUT SCIATICA: ICD-10-CM

## 2019-05-07 DIAGNOSIS — M47.817 LUMBOSACRAL SPONDYLOSIS WITHOUT MYELOPATHY: ICD-10-CM

## 2019-05-07 DIAGNOSIS — M53.3 SACROILIAC JOINT PAIN: Primary | ICD-10-CM

## 2019-05-07 DIAGNOSIS — M54.50 CHRONIC BILATERAL LOW BACK PAIN WITHOUT SCIATICA: ICD-10-CM

## 2019-05-07 RX ORDER — LIDOCAINE 50 MG/G
1 PATCH TOPICAL EVERY 24 HOURS
Qty: 30 EACH | Refills: 2 | Status: SHIPPED | OUTPATIENT
Start: 2019-05-07 | End: 2019-06-06

## 2019-05-07 RX ORDER — FENTANYL 25 UG/1
1 PATCH TRANSDERMAL
Qty: 10 PATCH | Refills: 0 | Status: SHIPPED | OUTPATIENT
Start: 2019-05-07 | End: 2019-06-04 | Stop reason: SDUPTHER

## 2019-05-07 RX ORDER — OXYCODONE HYDROCHLORIDE 10 MG/1
10 TABLET ORAL
Qty: 10 TAB | Refills: 0 | Status: SHIPPED | OUTPATIENT
Start: 2019-05-13 | End: 2019-06-04 | Stop reason: SDUPTHER

## 2019-05-07 RX ORDER — HYDROXYZINE 25 MG/1
25 TABLET, FILM COATED ORAL
Qty: 12 TAB | Refills: 0 | Status: SHIPPED | OUTPATIENT
Start: 2019-05-07 | End: 2019-11-08

## 2019-05-07 RX ORDER — CLONIDINE HYDROCHLORIDE 0.1 MG/1
0.1 TABLET ORAL
Qty: 12 TAB | Refills: 0 | Status: SHIPPED | OUTPATIENT
Start: 2019-05-07 | End: 2019-12-06

## 2019-05-07 NOTE — PATIENT INSTRUCTIONS

## 2019-05-07 NOTE — PROGRESS NOTES
Nursing Notes    Patient presents to the office today in follow-up. Patient rates her pain at 5/10 on the numerical pain scale. Reviewed medications with counts as follows:    Rx Date filled Qty Dispensed Pill Count Last Dose Short   Oxycodone 101 mg 05/03/19 60 56 This am no   Fentanyl 25 mcg 04/06/19 10 1 on  Sunday no        reviewed YES  Any aberrancies noted on  NO  Last opioid agreement 08/24/18  Last urine drug screen 02/12/19    Comments:  Patient is here today for a follow up appt today for her chronic back pain . She states her pain level today is a 5  PHq 2 was done patient denies any depression. She states she has seen her pcm and labs were taken there  Patient did not bring in her box for her Fentanyl. She has her script she has not filled it yet    POC UDS was not performed in office today    Any new labs or imaging since last appointment? YES    Have you been to an emergency room (ER) or urgent care clinic since your last visit? NO            Have you been hospitalized since your last visit? NO     If yes, where, when, and reason for visit? Have you seen or consulted any other health care providers outside of the [address] [place] [address]  since your last visit? YES   PCM   If yes, where, when, and reason for visit? Ms. Maxime Sethi has a reminder for a \"due or due soon\" health maintenance. I have asked that she contact her primary care provider for follow-up on this health maintenance.

## 2019-05-08 ENCOUNTER — TELEPHONE (OUTPATIENT)
Dept: PAIN MANAGEMENT | Age: 74
End: 2019-05-08

## 2019-05-08 NOTE — TELEPHONE ENCOUNTER
Lidocaine patch pa submitted to [facility] Waiting on response.

## 2019-05-08 NOTE — TELEPHONE ENCOUNTER
Lidocaine patch was denied by [employer] ([facility]). Pt does not meet requirements of the plan. The only approved diagnoses are post-herpetic neuralgia, diabetic neuropathy or cancer-related neuropathy. There is no documentation pt has any of these diagnoses.

## 2019-05-09 NOTE — TELEPHONE ENCOUNTER
Please contact patient letting her know that the refill for her 5% lidocaine patches were denied by her insurance. She can obtain 4% Lidocaine patches OTC to be used in its place.

## 2019-05-09 NOTE — TELEPHONE ENCOUNTER
Called pt to let her know about denial of lidocaine patches and Otto Campa said pt could purchase the otc 4% patches. Pt understood.

## 2019-05-10 NOTE — PROGRESS NOTES
Referral date 7+ years ago, source ? ? and for chronic lower back pain. HPI:  Faiza Benjamin is a 68 y.o. female here for f/u visit for ongoing evaluation of chronic lower back pain. Pt was last seen here on 2/12/19. Pt denies interval changes on the character or distribution of her chronic pain. Pain is located lower lumbar belt line region, neck, mid back, legs and feet. The pain is described as stabbing, aching, burning and numbness. She also has \"muslce spasms\" in her lower back left side. Pain at its best is 5/10. Pain at its worse is 8/10. The pain is worsened by driving, \"moving a certain way\", prolonged sitting, prolonged standing and prolonged walking. Symptoms are improved by frequent positional changes, sleep, elevating legs, Lidoderm patches, biofreeze topical cream, Zanaflex, tylenol, opioid medications, Gabapentin, heat and massage. Pt has tried PT and in the past with no perceived benefit. She has not tried aquatic PT, acupuncture, chiropractor, Cymbalta, TCA, Topamax, H-wave, Qutenza or NexWave E-stem device. Off Ativan. Since last visit, she reports new and worsening HA's, neck and arm pain in which she has an appointment with her PCP to discuss today. She saw ortho as recommended and has been using custom molded left AFO which she has been using on her left ankle. Pt states she had a massage which helped temporarily. She has noted irritability and sweating with prior decreases in her opioid medication. Pt will be traveling to [facility] from 5/19/19 to 5/30/19 and is concerned that the increase in activity and walking will intensify her pain and discomfort. She is requesting to go back to Fentanyl 25mcg/hr patch she was on last month until she comes back from her trip.       Social History     Socioeconomic History    Marital status:      Spouse name: Not on file    Number of children: Not on file    Years of education: Not on file    Highest education level: Not on file   Occupational History    Not on file   Social Needs    Financial resource strain: Not on file    Food insecurity:     Worry: Not on file     Inability: Not on file    Transportation needs:     Medical: Not on file     Non-medical: Not on file   Tobacco Use    Smoking status: Never Smoker    Smokeless tobacco: Never Used   Substance and Sexual Activity    Alcohol use: Not on file    Drug use: Not on file    Sexual activity: Yes     Birth control/protection: Surgical     Comment: BTL   Lifestyle    Physical activity:     Days per week: Not on file     Minutes per session: Not on file    Stress: Not on file   Relationships    Social connections:     Talks on phone: Not on file     Gets together: Not on file     Attends Baptism service: Not on file     Active member of club or organization: Not on file     Attends meetings of clubs or organizations: Not on file     Relationship status: Not on file    Intimate partner violence:     Fear of current or ex partner: Not on file     Emotionally abused: Not on file     Physically abused: Not on file     Forced sexual activity: Not on file   Other Topics Concern    Not on file   Social History Narrative    Not on file     Family History   Problem Relation Age of Onset    Hypertension Mother     Heart Disease Father     Diabetes Neg Hx      Allergies   Allergen Reactions    Codeine Nausea Only    Nsaids (Non-Steroidal Anti-Inflammatory Drug) Diarrhea     Abdominal pain    Sulfa (Sulfonamide Antibiotics) Hives     Past Medical History:   Diagnosis Date    GERD (gastroesophageal reflux disease)     Hiatal hernia     Hypertension     Nausea     Osteoarthritis     Sacroiliitis ([facility] [address]      Past Surgical History:   Procedure Laterality Date    HX APPENDECTOMY      HX CHOLECYSTECTOMY      HX GYN      uterine surgery    HX KNEE REPLACEMENT       Current Outpatient Medications on File Prior to Visit   Medication Sig    oxyCODONE IR (ROXICODONE) 10 mg tab immediate release tablet Take 1 Tab by mouth two (2) times a day for 30 days. Max Daily Amount: 20 mg.    hydroCHLOROthiazide (HYDRODIURIL) 25 mg tablet Take 25 mg by mouth daily.  tiZANidine (ZANAFLEX) 4 mg tablet Take 1-2 Tabs by mouth three (3) times daily. As needed for muscle spasms (Patient taking differently: Take 4-8 mg by mouth nightly. As needed for muscle spasms)    gabapentin (NEURONTIN) 600 mg tablet Take 2 Tabs by mouth three (3) times daily.  senna (SENNA) 8.6 mg tablet Take 1 Tab by mouth daily as needed for Constipation.  naloxone 4 mg/actuation spry 4 mg by Nasal route once as needed (for opioid overdose) for up to 1 dose. Indications: OPIOID TOXICITY    lisinopril (PRINIVIL, ZESTRIL) 20 mg tablet Take 40 mg by mouth daily.  ESOMEPRAZOLE MAG TRIHYDRATE (NEXIUM PO) Take 40 mg by mouth daily.  docusate sodium (COLACE) 100 mg capsule Take 1 Cap by mouth two (2) times daily as needed for Constipation. No current facility-administered medications on file prior to visit. ROS:  Reports anxiety and irritability. Denies fever, chills, nausea, vomiting, diarrhea, constipation, abdominal pain, chest pain, shortness or breath/trouble breathing, weakness, trouble swallowing, changes in vision, changes in hearing, falls, dizziness, bladder incontinence, bowel incontinence, depression, suicidal ideations, homicidal ideations or alcohol use. Opioid specific risk: GERD, MAHAMED on CPAP      Vitals:    05/07/19 1102   BP: 132/73   Pulse: 64   Resp: 14   Temp: 97.1 °F (36.2 °C)   SpO2: 98%   Weight: 59 kg (130 lb)   Height: 5' 1\" (1.549 m)   PainSc:   5   PainLoc: Back          Physical exam:  AFVSS, no acute distress, normal body habitus. A&OXs 3. Normocephalic, atraumatic. Conjugate gaze, clear sclerae. Speech is clear and appropriate. Mood is appropriate and patient is cooperative. Gait and balance are within functional limits. Non-labored breathing.       Calculated MEQ - 90  Naloxone rescue - yes  Prophylactic bowel program - yes  Date of last OCA 8/24/18  Last UDS 2/12/19, consistent  Prior UDS 8/24/28, not consistent (+) zolpidem metabolite 910 NG/mL, last prescription 12/5/17   date checked today, findings consistent    Primary Care Physician  [email or web address]  [phone]    Today   Last Visit  Prior Visit(s)  ORT - n/a  n/a   1  PGIC - 1 & 9  1 & 9   5 & 3  VIDA - 50%  44%   54%  COMM - 1  2   6    PHQ -- .  3 most recent PHQ Screens 5/7/2019   Little interest or pleasure in doing things Not at all   Feeling down, depressed, irritable, or hopeless Not at all   Total Score PHQ 2 0         Assessment/Plan:     ICD-10-CM ICD-9-CM    1. Sacroiliac joint pain M53.3 724.6 fentaNYL (DURAGESIC) 25 mcg/hr PATCH      lidocaine (LIDODERM) 5 %      oxyCODONE IR (ROXICODONE) 10 mg tab immediate release tablet   2. Sacroiliitis (HCC) M46.1 720.2 fentaNYL (DURAGESIC) 25 mcg/hr PATCH      lidocaine (LIDODERM) 5 %      oxyCODONE IR (ROXICODONE) 10 mg tab immediate release tablet   3. Chronic bilateral low back pain without sciatica M54.5 724.2 lidocaine (LIDODERM) 5 %    G89.29 338.29 oxyCODONE IR (ROXICODONE) 10 mg tab immediate release tablet   4. DDD (degenerative disc disease), lumbar M51.36 722.52 lidocaine (LIDODERM) 5 %      oxyCODONE IR (ROXICODONE) 10 mg tab immediate release tablet   5. Lumbosacral spondylosis without myelopathy M47.817 721.3 fentaNYL (DURAGESIC) 25 mcg/hr PATCH      lidocaine (LIDODERM) 5 %      oxyCODONE IR (ROXICODONE) 10 mg tab immediate release tablet   6. Spasm of back muscles M62.830 724.8 lidocaine (LIDODERM) 5 %      oxyCODONE IR (ROXICODONE) 10 mg tab immediate release tablet   7. Chronic pain syndrome G89.4 338.4 lidocaine (LIDODERM) 5 %      oxyCODONE IR (ROXICODONE) 10 mg tab immediate release tablet   8.  Encounter for long-term (current) use of high-risk medication Z79.899 V58.69 hydrOXYzine HCl (ATARAX) 25 mg tablet      cloNIDine HCl (CATAPRES) 0.1 mg tablet        Do not recommend long term opioid therapy for this patient at this time; risks outweigh potential benefits. Pt currently taking  fentanyl 25mcg/hr patch changed every 72 hours and oxycodone IR 10mg up to 2 times a day as needed with a total of 60 tabs to be used over 30 daysas needed. Their MME is 90. Today, we will pause the weaning of patients opioid medication with a goal of being opioid free, pending safety and compliance. Pt instructed to call if they experience any signs of withdrawal (diarrhea, nausea, vomiting, sweating or chills, agitation, itching). The Fentanyl 12mcg/hr patch script she was provided with on 5/1/19 was voided and shredded. Today, pt provided with Fentanyl 25mcg/hr patch changed every 72 hours. Her oxycodone IR 10mg up to 2 times a day as needed with a total of 60 tabs to be used over 30 days was just filled on 5/3/19. She will be provided with an additional 10 tabs of 10mg oxycodone IR to be used as needed while on her trip in [facility]. Her new MME will be 95. Will address short-acting opioid once extended release has been discontinued. Pt instructed to call 5-7 days before they run out of their medications for refill at this time she will be provided with Fentanyl 25mcg/hr patch changed every 72 hours and oxycodone IR 10mg up to 2 times a day as needed with a total of 60 tabs to be used over 30 days. At next office visit, the plan is to provide patient with Fentanyl 12mcg/hr patch changed every 3 days and oxycodone IR 10mg up to 3 times a day as needed with a total of 90 tabs to be used over 30 days. Their new MME will be 73.8. If patient has difficulty with the wean or difficulty with cravings we will consider referral to mental health for ongoing assessment and treatment for opioid use disorder. --Continue Lidoderm patches; refill provided today.   --Clonidine and Hydroxyzine provided today to be used as needed for symptoms or withdrawal.  --Continue Gabapentin and Zanaflex as previously recommended. --Consider changing Zanaflex to Skelaxin at patients next office visit due to sleepy/fatigued feeling she feels the next day after taking Zanaflex. --Pt would benefit from aquatic PT, acupuncture therapy, H-wave and/or Qutenza patch in the future. --Consider trial of medial branch block/RF at the inside apex of the curve in the future. In the future consider tylenol optimization. Follow up ongoing assessment and ongoing development of integrative and comprehensive plan of care for chronic pain. Goals: To establish complementary and integrative plan of care to address chronic pain issues while minimizing pharmaceuticals to maximize patient's function improve quality of life. Education:  Patient again educated on the importance of strict compliance with the opioid care agreement while on opioid therapy. Patient also again educated that they should avoid driving while on chronic opioid therapy. Also advised to avoid alcohol and to avoid benzodiazepines while on opioid therapy. Handouts given regarding opioid safety. Follow-up and Dispositions    · Return in about 3 months (around 8/7/2019) for 30 min. [address] was used for portions of this report. Unintended errors may occur.

## 2019-06-04 DIAGNOSIS — G89.4 CHRONIC PAIN SYNDROME: ICD-10-CM

## 2019-06-04 DIAGNOSIS — M51.36 DDD (DEGENERATIVE DISC DISEASE), LUMBAR: ICD-10-CM

## 2019-06-04 DIAGNOSIS — M47.817 LUMBOSACRAL SPONDYLOSIS WITHOUT MYELOPATHY: ICD-10-CM

## 2019-06-04 DIAGNOSIS — G89.29 CHRONIC BILATERAL LOW BACK PAIN WITHOUT SCIATICA: ICD-10-CM

## 2019-06-04 DIAGNOSIS — M53.3 SACROILIAC JOINT PAIN: ICD-10-CM

## 2019-06-04 DIAGNOSIS — M62.830 SPASM OF BACK MUSCLES: ICD-10-CM

## 2019-06-04 DIAGNOSIS — M46.1 SACROILIITIS (HCC): ICD-10-CM

## 2019-06-04 DIAGNOSIS — M54.50 CHRONIC BILATERAL LOW BACK PAIN WITHOUT SCIATICA: ICD-10-CM

## 2019-06-04 RX ORDER — FENTANYL 12.5 UG/1
1 PATCH TRANSDERMAL
Qty: 10 PATCH | Refills: 0 | OUTPATIENT
Start: 2019-06-06 | End: 2019-07-06

## 2019-06-04 RX ORDER — FENTANYL 25 UG/1
1 PATCH TRANSDERMAL
Qty: 10 PATCH | Refills: 0 | Status: SHIPPED | OUTPATIENT
Start: 2019-06-06 | End: 2019-07-08 | Stop reason: SDUPTHER

## 2019-06-04 RX ORDER — OXYCODONE HYDROCHLORIDE 10 MG/1
10 TABLET ORAL
Qty: 60 TAB | Refills: 0 | Status: SHIPPED | OUTPATIENT
Start: 2019-06-04 | End: 2019-07-08 | Stop reason: SDUPTHER

## 2019-06-04 NOTE — TELEPHONE ENCOUNTER
Aristeo Ron has called requesting a refill of their controlled medication, Fentanyl and Oxycodone, for the management of chronic pain. Last office visit date: 5/7/19  Last opioid care agreement 8/18  Last UDS was done 2/19    Date last  was pulled and reviewed : 6/4/19  Last fill date for medication was 5/7/19 and 5/3/19    Was the patient compliant when the above report was pulled? yes    Analgesia: Patient reports 30-40% pain relief on current regimen. Aberrancies: No aberrancies noted in the last 30 days. ADL's: Patient states they are able to perform ADL's on current regimen just slower. and painful. Adverse Reaction: Patient reports no adverse reactions at this time. Provider's last note and plan of care reviewed? yes  Request forwarded to provider for review.

## 2019-06-04 NOTE — TELEPHONE ENCOUNTER
Spoke with patient after getting 2 points of identity, informing patient we have 2 prescriptions ready to pickup and to please come before 3:45pm Monday thru Friday with valid picture ID. Patient confirmed.

## 2019-07-08 DIAGNOSIS — M53.3 SACROILIAC JOINT PAIN: ICD-10-CM

## 2019-07-08 DIAGNOSIS — M62.830 SPASM OF BACK MUSCLES: ICD-10-CM

## 2019-07-08 DIAGNOSIS — M46.1 SACROILIITIS (HCC): ICD-10-CM

## 2019-07-08 DIAGNOSIS — M47.817 LUMBOSACRAL SPONDYLOSIS WITHOUT MYELOPATHY: ICD-10-CM

## 2019-07-08 DIAGNOSIS — G89.4 CHRONIC PAIN SYNDROME: ICD-10-CM

## 2019-07-08 DIAGNOSIS — M51.36 DDD (DEGENERATIVE DISC DISEASE), LUMBAR: ICD-10-CM

## 2019-07-08 DIAGNOSIS — M54.50 CHRONIC BILATERAL LOW BACK PAIN WITHOUT SCIATICA: ICD-10-CM

## 2019-07-08 DIAGNOSIS — G89.29 CHRONIC BILATERAL LOW BACK PAIN WITHOUT SCIATICA: ICD-10-CM

## 2019-07-08 NOTE — TELEPHONE ENCOUNTER
Janice Allen has called requesting a refill of their controlled medication, Roxicodone 10 mg tab and Fentanyl 25 mcg/hr patch, for the management of chronic pain. Last office visit date: 5/7/19 with TEJINDER Carballo and has a f/u appt on 8/6/19. Last opioid care agreement 8/24/18  Last UDS was done 2/12/19    Date last  was pulled and reviewed : 7/8/19  Last fill date for medication was 6/6/19    Was the patient compliant when the above report was pulled? yes    Analgesia: Patient reports 50% pain relief on current regimen. Aberrancies: No aberrancies noted in the last 30 days. ADL's: Patient states they are able to perform ADL's on current regimen. Adverse Reaction: Patient reports no adverse reactions at this time. Provider's last note and plan of care reviewed? yes  Request forwarded to provider for review.

## 2019-07-09 RX ORDER — FENTANYL 25 UG/1
1 PATCH TRANSDERMAL
Qty: 10 PATCH | Refills: 0 | Status: SHIPPED | OUTPATIENT
Start: 2019-07-09 | End: 2019-08-06 | Stop reason: SDUPTHER

## 2019-07-09 RX ORDER — OXYCODONE HYDROCHLORIDE 10 MG/1
10 TABLET ORAL
Qty: 60 TAB | Refills: 0 | Status: SHIPPED | OUTPATIENT
Start: 2019-07-09 | End: 2019-08-06 | Stop reason: SDUPTHER

## 2019-08-02 DIAGNOSIS — G89.29 CHRONIC MIDLINE THORACIC BACK PAIN: ICD-10-CM

## 2019-08-02 DIAGNOSIS — M62.830 SPASM OF BACK MUSCLES: ICD-10-CM

## 2019-08-02 DIAGNOSIS — G89.4 CHRONIC PAIN SYNDROME: ICD-10-CM

## 2019-08-02 DIAGNOSIS — M51.36 DDD (DEGENERATIVE DISC DISEASE), LUMBAR: ICD-10-CM

## 2019-08-02 DIAGNOSIS — M46.1 SACROILIITIS (HCC): ICD-10-CM

## 2019-08-02 DIAGNOSIS — M76.892 ENTHESOPATHY OF HIP REGION ON BOTH SIDES: ICD-10-CM

## 2019-08-02 DIAGNOSIS — M53.3 SACROILIAC JOINT PAIN: ICD-10-CM

## 2019-08-02 DIAGNOSIS — G89.29 CHRONIC BILATERAL LOW BACK PAIN WITHOUT SCIATICA: ICD-10-CM

## 2019-08-02 DIAGNOSIS — M76.891 ENTHESOPATHY OF HIP REGION ON BOTH SIDES: ICD-10-CM

## 2019-08-02 DIAGNOSIS — M54.50 CHRONIC BILATERAL LOW BACK PAIN WITHOUT SCIATICA: ICD-10-CM

## 2019-08-02 DIAGNOSIS — M17.0 PRIMARY OSTEOARTHRITIS OF BOTH KNEES: ICD-10-CM

## 2019-08-02 DIAGNOSIS — M47.817 LUMBOSACRAL SPONDYLOSIS WITHOUT MYELOPATHY: ICD-10-CM

## 2019-08-02 DIAGNOSIS — M25.50 PAIN IN JOINT, MULTIPLE SITES: ICD-10-CM

## 2019-08-02 DIAGNOSIS — M54.6 CHRONIC MIDLINE THORACIC BACK PAIN: ICD-10-CM

## 2019-08-02 RX ORDER — GABAPENTIN 600 MG/1
1200 TABLET ORAL 3 TIMES DAILY
Qty: 540 TAB | Refills: 0 | Status: SHIPPED | OUTPATIENT
Start: 2019-08-02 | End: 2019-08-06

## 2019-08-06 ENCOUNTER — OFFICE VISIT (OUTPATIENT)
Dept: PAIN MANAGEMENT | Age: 74
End: 2019-08-06

## 2019-08-06 VITALS
TEMPERATURE: 96.4 F | DIASTOLIC BLOOD PRESSURE: 86 MMHG | SYSTOLIC BLOOD PRESSURE: 160 MMHG | OXYGEN SATURATION: 98 % | BODY MASS INDEX: 24.55 KG/M2 | WEIGHT: 130 LBS | RESPIRATION RATE: 14 BRPM | HEART RATE: 60 BPM | HEIGHT: 61 IN

## 2019-08-06 DIAGNOSIS — Z79.899 ENCOUNTER FOR LONG-TERM (CURRENT) USE OF HIGH-RISK MEDICATION: ICD-10-CM

## 2019-08-06 DIAGNOSIS — M54.50 CHRONIC BILATERAL LOW BACK PAIN WITHOUT SCIATICA: Primary | ICD-10-CM

## 2019-08-06 DIAGNOSIS — M51.36 DDD (DEGENERATIVE DISC DISEASE), LUMBAR: ICD-10-CM

## 2019-08-06 DIAGNOSIS — G89.4 CHRONIC PAIN SYNDROME: ICD-10-CM

## 2019-08-06 DIAGNOSIS — M53.3 SACROILIAC JOINT PAIN: ICD-10-CM

## 2019-08-06 DIAGNOSIS — G89.29 CHRONIC BILATERAL LOW BACK PAIN WITHOUT SCIATICA: Primary | ICD-10-CM

## 2019-08-06 DIAGNOSIS — M62.830 SPASM OF BACK MUSCLES: ICD-10-CM

## 2019-08-06 DIAGNOSIS — M46.1 SACROILIITIS (HCC): ICD-10-CM

## 2019-08-06 DIAGNOSIS — M47.817 LUMBOSACRAL SPONDYLOSIS WITHOUT MYELOPATHY: ICD-10-CM

## 2019-08-06 RX ORDER — GABAPENTIN 600 MG/1
600 TABLET ORAL 3 TIMES DAILY
Qty: 90 TAB | Refills: 1 | Status: SHIPPED | OUTPATIENT
Start: 2019-08-06 | End: 2019-10-23 | Stop reason: SDUPTHER

## 2019-08-06 RX ORDER — FENTANYL 25 UG/1
1 PATCH TRANSDERMAL
Qty: 10 PATCH | Refills: 0 | Status: SHIPPED | OUTPATIENT
Start: 2019-08-14 | End: 2019-09-16 | Stop reason: SDUPTHER

## 2019-08-06 RX ORDER — OXYCODONE HYDROCHLORIDE 10 MG/1
10 TABLET ORAL
Qty: 60 TAB | Refills: 0 | Status: SHIPPED | OUTPATIENT
Start: 2019-08-06 | End: 2019-09-05 | Stop reason: SDUPTHER

## 2019-08-06 NOTE — PROGRESS NOTES
Referral date 7+ years ago, source ? ? and for chronic lower back pain. Today   Last Visit Prior Visit(s)  ORT - n/a  n/a  n/a   1  PGIC - 1 & 10  1 & 9  1 & 9   5 & 3  VIDA - 58%  50%  44%   54%  COMM - 6  1  2   6    Calculated MEQ - 90  Naloxone rescue - yes  Prophylactic bowel program - yes  Date of last OCA today  Last UDS today, consistent POC, pending confirmatory testing  Prior UDS 2/12/19, consistent   date checked today, findings consistent      HPI:  Florence Gordillo is a 68 y.o. female here for f/u visit for ongoing evaluation of chronic lower back pain. Pt was last seen here on 5/7/19. Pt denies interval changes on the character or distribution of her chronic pain. Pain is located lower lumbar belt line region, neck, mid back, legs and feet. The pain is described as stabbing, aching, burning and numbness. She also has \"muslce spasms\" in her lower back left side. Pain at its best is 4/10. Pain at its worse is 8/10. The pain is worsened by driving, \"moving a certain way\", prolonged sitting, prolonged standing and prolonged walking. Symptoms are improved by frequent positional changes, sleep, elevating legs, Lidoderm patches, biofreeze topical cream, Zanaflex, tylenol, opioid medications, Gabapentin, heat and massage. Pt has tried PT and in the past with no perceived benefit. She has not tried aquatic PT, acupuncture, chiropractor, Cymbalta, TCA, Topamax, H-wave, Qutenza or NexWave E-stem device. Since last visit, she reports multiple issues going on. She continues to have new and worsening HA's, neck and arm pain; she has orthopedist appt with Dr Ismael Garcia next week. She was told she needed an ankle fusion and she does not want any surgery. She reports 1 week hx of coldness, sweating and generalized fatigue; this has not been evaluated by her PCP. She also reports her plantar fasciitis has been bothering her more lately.     Interventional Pain Procedures:  5/30/17 - Left L5, S1-S3 lumbar RFA with  [facility]  5/15/17 - Right L5, S1-S3 lumbar RFA with Dr Horacio Alvarez  4/19/17 - bilateral L5 Lumbar MBB/bilateral S1-S3 sacral MMB with Dr Perlita Dobbs History     Socioeconomic History    Marital status:      Spouse name: Not on file    Number of children: Not on file    Years of education: Not on file    Highest education level: Not on file   Occupational History    Not on file   Social Needs    Financial resource strain: Not on file    Food insecurity:     Worry: Not on file     Inability: Not on file    Transportation needs:     Medical: Not on file     Non-medical: Not on file   Tobacco Use    Smoking status: Never Smoker    Smokeless tobacco: Never Used   Substance and Sexual Activity    Alcohol use: Not on file    Drug use: Not on file    Sexual activity: Yes     Birth control/protection: Surgical     Comment: BTL   Lifestyle    Physical activity:     Days per week: Not on file     Minutes per session: Not on file    Stress: Not on file   Relationships    Social connections:     Talks on phone: Not on file     Gets together: Not on file     Attends [employer] service: Not on file     Active member of club or organization: Not on file     Attends meetings of clubs or organizations: Not on file     Relationship status: Not on file    Intimate partner violence:     Fear of current or ex partner: Not on file     Emotionally abused: Not on file     Physically abused: Not on file     Forced sexual activity: Not on file   Other Topics Concern    Not on file   Social History Narrative    Not on file     Family History   Problem Relation Age of Onset    Hypertension Mother     Heart Disease Father     Diabetes Neg Hx      Allergies   Allergen Reactions    Codeine Nausea Only    Nsaids (Non-Steroidal Anti-Inflammatory Drug) Diarrhea     Abdominal pain    Sulfa (Sulfonamide Antibiotics) Hives     Past Medical History:   Diagnosis Date    GERD (gastroesophageal reflux disease)     Hiatal hernia     Hypertension     Nausea     Osteoarthritis     Sacroiliitis (HCC)      Past Surgical History:   Procedure Laterality Date    HX APPENDECTOMY      HX CHOLECYSTECTOMY      HX GYN      uterine surgery    HX KNEE REPLACEMENT       Current Outpatient Medications on File Prior to Visit   Medication Sig    hydrOXYzine HCl (ATARAX) 25 mg tablet Take 1 Tab by mouth every eight (8) hours as needed for Itching (anxiety, pruritis, irritation, itching) for up to 12 doses.  cloNIDine HCl (CATAPRES) 0.1 mg tablet Take 1 Tab by mouth every twelve (12) hours as needed (sweating, restlessness, hot flashes) for up to 12 doses.  hydroCHLOROthiazide (HYDRODIURIL) 25 mg tablet Take 25 mg by mouth daily.  tiZANidine (ZANAFLEX) 4 mg tablet Take 1-2 Tabs by mouth three (3) times daily. As needed for muscle spasms (Patient taking differently: Take 4-8 mg by mouth nightly. As needed for muscle spasms)    senna (SENNA) 8.6 mg tablet Take 1 Tab by mouth daily as needed for Constipation.  docusate sodium (COLACE) 100 mg capsule Take 1 Cap by mouth two (2) times daily as needed for Constipation.  naloxone 4 mg/actuation spry 4 mg by Nasal route once as needed (for opioid overdose) for up to 1 dose. Indications: OPIOID TOXICITY    lisinopril (PRINIVIL, ZESTRIL) 20 mg tablet Take 40 mg by mouth daily.  ESOMEPRAZOLE MAG TRIHYDRATE (NEXIUM PO) Take 40 mg by mouth daily. No current facility-administered medications on file prior to visit. ROS:  Reports generalize weakness. Denies fever, chills, nausea, vomiting, diarrhea, constipation, chest pain, shortness or breath/trouble breathing, abdominal pain, trouble swallowing, changes in vision, changes in hearing, falls, dizziness, bladder incontinence, bowel incontinence, depression, anxiety, suicidal ideations, homicidal ideations or alcohol use.     Opioid specific risk: GERD, MAHAMED on CPAP      Vitals:    08/06/19 1147   BP: 160/86   Pulse: 60   Resp: 14   Temp: 96.4 °F (35.8 °C)   SpO2: 98%   Weight: 59 kg (130 lb)   Height: 5' 1\" (1.549 m)   PainSc:   5   PainLoc: Back          Physical exam:  AFVSS, no acute distress, normal body habitus. A&OXs 3. Normocephalic, atraumatic. Conjugate gaze, clear sclerae. Speech is clear and appropriate. Mood is appropriate and patient is cooperative. Gait and balance are within functional limits. Non-labored breathing. Primary Care Physician  [address] [place] [address]  [phone]        PHQ -- .  3 most recent PHQ Screens 8/6/2019   Little interest or pleasure in doing things Not at all   Feeling down, depressed, irritable, or hopeless Not at all   Total Score PHQ 2 0         Assessment/Plan:     ICD-10-CM ICD-9-CM    1. Chronic bilateral low back pain without sciatica M54.5 724.2 gabapentin (NEURONTIN) 600 mg tablet    G89.29 338.29 fentaNYL (DURAGESIC) 25 mcg/hr PATCH      oxyCODONE IR (ROXICODONE) 10 mg tab immediate release tablet   2. Lumbosacral spondylosis without myelopathy M47.817 721.3 gabapentin (NEURONTIN) 600 mg tablet      fentaNYL (DURAGESIC) 25 mcg/hr PATCH      oxyCODONE IR (ROXICODONE) 10 mg tab immediate release tablet   3. Sacroiliitis (HCC) M46.1 720.2 gabapentin (NEURONTIN) 600 mg tablet      fentaNYL (DURAGESIC) 25 mcg/hr PATCH      oxyCODONE IR (ROXICODONE) 10 mg tab immediate release tablet   4. Sacroiliac joint pain M53.3 724.6    5. Spasm of back muscles M62.830 724.8 gabapentin (NEURONTIN) 600 mg tablet      fentaNYL (DURAGESIC) 25 mcg/hr PATCH      oxyCODONE IR (ROXICODONE) 10 mg tab immediate release tablet   6. DDD (degenerative disc disease), lumbar M51.36 722.52 gabapentin (NEURONTIN) 600 mg tablet      fentaNYL (DURAGESIC) 25 mcg/hr PATCH      oxyCODONE IR (ROXICODONE) 10 mg tab immediate release tablet   7.  Chronic pain syndrome G89.4 338.4 gabapentin (NEURONTIN) 600 mg tablet      fentaNYL (DURAGESIC) 25 mcg/hr PATCH oxyCODONE IR (ROXICODONE) 10 mg tab immediate release tablet   8. Encounter for long-term (current) use of high-risk medication Z79.899 V58.69 DRUG SCREEN      AMB POC DRUG SCREEN ()        1) Medications (opiod and non-opiod)  --I do not recommend long term opioid therapy for this patient at this time; risks outweigh potential benefits. Pt currently taking fentanyl 25mcg/hr patch changed every 72 hours and oxycodone IR 10mg up to 2 times a day as needed with a total of 60 tabs to be used over 30 daysas needed. Their MME is 90. --Today, we will pause the weaning of patients opioid medication with a goal of being opioid free, pending safety and compliance which she is having evaluate with orthopedics regarding her new neck pain. Pt instructed to call if they experience any signs of withdrawal (diarrhea, nausea, vomiting, sweating or chills, agitation, itching). --Will address short-acting opioid once extended release has been discontinued. --Pt instructed to call 5-7 days before they run out of their medications for refill. --At next office visit, the plan is to provide patient with Fentanyl 12mcg/hr patch changed every 3 days and oxycodone IR 10mg up to 3 times a day as needed with a total of 90 tabs to be used over 30 days. Their new MME will be 74. If patient has difficulty with the wean or difficulty with cravings we will consider referral to mental health for ongoing assessment and treatment for opioid use disorder. --Continue Lidoderm patches as previously recommended. --She did not use Clonidine and Hydroxyzine provided last visit. --Gabapentin refilled today, will increased to 600mg TID as she is only taking it BID currently. --Continue Zanaflex as previously recommended; pt states she only takes one tablet at night due to sleepy/sedated feeling it gives her. Discussed with her we can changed this to a lesser sedating muscle relaxer such as Norflex or Skelaxin but she declines.   --Continue Tylenol as needed; max of 3000mg acetaminophen per 24 hour period. 2) Restorative Therapies  --Pt would benefit from aquatic PT, acupuncture therapy, H-wave and/or Qutenza patch in the future. 3) Interventional Procedures  --Pt is interested in trial of medial branch block/RF at the inside apex of the curve as recommended by Dr Emelia Lindsey. 4) Behavorial Health Approaches  --Pt would benefit from referral to pain psychology for training and cognitive behavorial therapy, acceptance commitment therapy, biofeedback and mindfulness mediation and other modalities as indicated for treatment of chronic pain once this service is available at our clinic. She declines referral to Dr Kamini Cardenas today. 5) Complementary & Integrative Health  --Follow up ongoing assessment and ongoing development of integrative and comprehensive plan of care for chronic pain. Goals: To establish complementary and integrative plan of care to address chronic pain issues while minimizing pharmaceuticals to maximize patient's function improve quality of life. Education:  Patient again educated on the importance of strict compliance with the opioid care agreement while on opioid therapy. Patient also again educated that they should avoid driving while on chronic opioid therapy. Also advised to avoid alcohol and to avoid benzodiazepines while on opioid therapy. Handouts given regarding opioid safety. Follow-up and Dispositions    · Return in about 3 months (around 11/6/2019) for 30 min. [address] was used for portions of this report. Unintended errors may occur.

## 2019-08-06 NOTE — PATIENT INSTRUCTIONS

## 2019-08-06 NOTE — PROGRESS NOTES
Nursing Notes    Patient presents to the office today in follow-up. Patient rates her pain at 5/10 on the numerical pain scale. Reviewed medications with counts as follows:    Rx Date filled Qty Dispensed Pill Count Last Dose Short   Oxycodone 10 mg 07/10/19 60 12 This am  no   Fentanyl 25 mcg 07/15/19 10 3+1 Yesterday no        reviewed YES  Any aberrancies noted on  NO  Last opioid agreement 08/24/19  Last urine drug screen 02/12/19     Comments:  Patient is here today for a follow up appt today for her chronic back pain. She states her pain level today is a 5  PHQ 2 was done patient denies any depression. She states she had a colonoscopy since her last appt here. She states she was not aware she had to call  Patient did not take her bp med today that is the reason her bp is elevated       POC UDS was performed in office today per verbal order per [place]    Any new labs or imaging since last appointment? YES    Have you been to an emergency room (ER) or urgent care clinic since your last visit? NO            Have you been hospitalized since your last visit? NO     If yes, where, when, and reason for visit? Have you seen or consulted any other health care providers outside of the [address] [place] [address]  since your last visit? YES   GI  If yes, where, when, and reason for visit? Ms. Ana Neves has a reminder for a \"due or due soon\" health maintenance. I have asked that she contact her primary care provider for follow-up on this health maintenance.

## 2019-08-06 NOTE — ACP (ADVANCE CARE PLANNING)
Patient has an acp and do not have any questions at this time. Patient verbalized understanding. Atrial fibrillation with slow ventricular response

## 2019-08-13 ENCOUNTER — TELEPHONE (OUTPATIENT)
Dept: PAIN MANAGEMENT | Age: 74
End: 2019-08-13

## 2019-08-13 NOTE — TELEPHONE ENCOUNTER
09:30am I called Ms. Dawna Richardson and I spoke to her about scheduling Lumbar MBB and LBB. I explained to the patient that given the new set of guidelines It is not sure that Medicare will pay for it or not. The claim will have to be submitted with supporting documentation and after review the will either pay or not. Per patient, stated that \"I had them done  before by Dr. Saundra Sweeney and they pay for it, I don't see why they wouldn't cover it\" she asked me to go ahead, and I scheduled for Wednesday 08/28/2019. She will require only one test because she had this procedure done before, but it has been over one year.    It will be followed by Right, and then Left RFA at L5/S1, and Sacral RFA at S1-S3.

## 2019-08-22 ENCOUNTER — TELEPHONE (OUTPATIENT)
Dept: PAIN MANAGEMENT | Age: 74
End: 2019-08-22

## 2019-08-22 NOTE — TELEPHONE ENCOUNTER
The pt was called and reminded that starting tomorrow to not take any medications that will thin her blood. This includes aspirin, ibuprofen, naproxen and diclofenac. This in preparation for her upcoming procedure on 08/28/19. She verbalized understanding and states that she does not take any medication like this any way.

## 2019-08-28 ENCOUNTER — APPOINTMENT (OUTPATIENT)
Dept: GENERAL RADIOLOGY | Age: 74
End: 2019-08-28
Attending: PHYSICAL MEDICINE & REHABILITATION
Payer: MEDICARE

## 2019-08-28 ENCOUNTER — HOSPITAL ENCOUNTER (OUTPATIENT)
Age: 74
Setting detail: OUTPATIENT SURGERY
Discharge: HOME | End: 2019-08-28
Attending: PHYSICAL MEDICINE & REHABILITATION | Admitting: PHYSICAL MEDICINE & REHABILITATION
Payer: MEDICARE

## 2019-08-28 VITALS
HEIGHT: 59 IN | OXYGEN SATURATION: 97 % | HEART RATE: 70 BPM | TEMPERATURE: 98 F | BODY MASS INDEX: 25.2 KG/M2 | DIASTOLIC BLOOD PRESSURE: 78 MMHG | SYSTOLIC BLOOD PRESSURE: 166 MMHG | RESPIRATION RATE: 18 BRPM | WEIGHT: 125 LBS

## 2019-08-28 PROCEDURE — 74011250636 HC RX REV CODE- 250/636: Performed by: PHYSICAL MEDICINE & REHABILITATION

## 2019-08-28 PROCEDURE — 76010000009 HC PAIN MGT 0 TO 30 MIN PROC: Performed by: PHYSICAL MEDICINE & REHABILITATION

## 2019-08-28 PROCEDURE — 77030003672 HC NDL SPN HALY -A: Performed by: PHYSICAL MEDICINE & REHABILITATION

## 2019-08-28 PROCEDURE — 77030003666 HC NDL SPINAL BD -A: Performed by: PHYSICAL MEDICINE & REHABILITATION

## 2019-08-28 RX ORDER — LIDOCAINE HYDROCHLORIDE 10 MG/ML
INJECTION, SOLUTION EPIDURAL; INFILTRATION; INTRACAUDAL; PERINEURAL AS NEEDED
Status: DISCONTINUED | OUTPATIENT
Start: 2019-08-28 | End: 2019-08-28 | Stop reason: HOSPADM

## 2019-08-28 RX ORDER — ROPIVACAINE HYDROCHLORIDE 2 MG/ML
INJECTION, SOLUTION EPIDURAL; INFILTRATION; PERINEURAL AS NEEDED
Status: DISCONTINUED | OUTPATIENT
Start: 2019-08-28 | End: 2019-08-28 | Stop reason: HOSPADM

## 2019-08-28 NOTE — DISCHARGE INSTRUCTIONS
[facility]      Post Procedures Instructions    *Resume Diet and Activity as tolerated. Rest for the remainder of the day. *You may fell worse before you feel better as the numbing medications wear off before the steroids take effect if used for your procedures. *Do not use affected extremity until numbness or loss of sensation has completely resolved without assistance. *DO NOT DRIVE, operate machinery/heavey equipment for 24 hours. *DO NOT DRINK ALCOHOL for 24 hours as it may interact with the sedation if you received it and also thins your blood and may cause you to bleed. *WAIT 24 hours before starting back ANY Blood thinning medications:   (Heparin, Coumadin, Warfarin, Lovenox, Plavix, Aggrenox)    *Resume Pre-Procedure Medications as prescribed except Blood Thinners unless directed by your Physician or Cardiologist.     *Avoid Hot tubs and Heating pad for 24 hours to prevent dissipation of medications, you may shower to remove bandages and remaining prep residue on the skin. * If you develop a Headache, drink plenty of fluids including beverages with caffeine (Coffee, Mt. Dew etc.) and rest.  If the headache persists longer than 24 hoursor intensifies - Please call [facility]) [phone]    * If you are DIABETIC, check your blood sugar three times a day for the next three days, the steroids will increase your blood sugar. If your blood sugar is greater than 400 have someone drive you to the nearest [address]. * If you experience any of the following problems, call the [facility] [phone] between 8:00 am - 4:30pm or After Hours [phone].     Shortness of breath    Fever of 101 F or higher    Nausea / Vomiting (not normal to you)    Increasing stiffness in the neck    Weakness or numbness in the arms or legs that is not resolving    Prolonged and increasing pain > than 4 days    ANYTHING OUT of the ORDINARY TO YOU    If YOU are experiencing a severe reaction / complication that you have never had before post procedure, call 911 or go to the nearest emergency room! All patients must have a  for transportation [place] regardless if you do or do not receive sedation. DISCHARGE SUMMARY from Nurse      PATIENT INSTRUCTIONS:    After Oral  or intravenous sedation, for 24 hours or while taking prescription Narcotics:  · Limit your activities  · Do not drive and operate hazardous machinery  · Do not make important personal or business decisions  · Do  not drink alcoholic beverages  · If you have not urinated within 8 hours after discharge, please contact your surgeon on call. Report the following to your surgeon:  · Excessive pain, swelling, redness or odor of or around the surgical area  · Temperature over 101  · Nausea and vomiting lasting longer than 4 hours or if unable to take medications  · Any signs of decreased circulation or nerve impairment to extremity: change in color, persistent  numbness, tingling, coldness or increase pain  · Any questions        What to do at Home:  Recommended activity: Activity as tolerated, NO DRIVING FOR 24 Hours post injection          *  Please give a list of your current medications to your Primary Care Provider. *  Please update this list whenever your medications are discontinued, doses are      changed, or new medications (including over-the-counter products) are added. *  Please carry medication information at all times in case of emergency situations. These are general instructions for a healthy lifestyle:    No smoking/ No tobacco products/ Avoid exposure to second hand smoke    Surgeon General's Warning:  Quitting smoking now greatly reduces serious risk to your health.     Obesity, smoking, and sedentary lifestyle greatly increases your risk for illness    A healthy diet, regular physical exercise & weight monitoring are important for maintaining a healthy lifestyle    You may be retaining fluid if you have a history of heart failure or if you experience any of the following symptoms:  Weight gain of 3 pounds or more overnight or 5 pounds in a week, increased swelling in our hands or feet or shortness of breath while lying flat in bed. Please call your doctor as soon as you notice any of these symptoms; do not wait until your next office visit. Recognize signs and symptoms of STROKE:    F-face looks uneven    A-arms unable to move or move unevenly    S-speech slurred or non-existent    T-time-call 911 as soon as signs and symptoms begin-DO NOT go       Back to bed or wait to see if you get better-TIME IS BRAIN.

## 2019-08-28 NOTE — PROCEDURES
[facility] for Pain Management  Brief Pre-Procedure History & Physical    PATIENT NAME:  Florence Gordillo   YOB: 1945  DATE OF SERVICE:  8/28/2019      CHIEF COMPLAINT:  Pain    HISTORY OF PRESENT ILLNESS:  Florence Gordillo presents today for a previously diagnosed problem contributing to some or all of this patients pain. The location and pattern of the pain has not changed substantially since the last visit in our office. No other significant medical changes have occurred in the last 30 days. PAST MEDICAL HISTORY:  The patient  has a past medical history of GERD (gastroesophageal reflux disease), Hiatal hernia, Hypertension, Nausea, Osteoarthritis, and Sacroiliitis ([address] PAST SURGICAL HISTORY:  The patient  has a past surgical history that includes hx knee replacement; hx appendectomy; hx cholecystectomy; and hx gyn. CURRENT MEDICATIONS:  See Medication Administration Record [facility]) in the patient's electronic record. ALLERGIES:    Allergies   Allergen Reactions    Codeine Nausea Only    Nsaids (Non-Steroidal Anti-Inflammatory Drug) Diarrhea     Abdominal pain    Sulfa (Sulfonamide Antibiotics) Hives       FAMILY HISTORY:  The patient family history includes Heart Disease in her father; Hypertension in her mother. SOCIAL HISTORY:  The patient  reports that she has never smoked. She has never used smokeless tobacco. The patient  has no alcohol history on file. She  has no drug history on file. REVIEW OF SYSTEMS:  Florence Gordillo denies any fever, chills, unexplained weight loss, use of antibiotics for recent infection or bleeding abnormalities. PHYSICAL EXAM:  VS:   Visit Vitals  /76 (BP 1 Location: Left arm, BP Patient Position: At rest;Sitting)   Pulse 62   Temp 98 °F (36.7 °C)   Resp 18   Ht 4' 11\" (1.499 m)   Wt 56.7 kg (125 lb)   SpO2 98%   BMI 25.25 kg/m²     Gen: Well-developed. Body habitus consistent with recorded height and weight and the calculated BMI. No apparent distress. Head: Normocephalic, atraumatic. Skin: No obvious rashes, lesions or infection. Pulm: Respirations are even and unlabored. Psych:    Mood, affect and speech  Appropriate. Tender to palpation bilaterally at the lumbosacral junction and bilat SIJs  Increased pain at the lumbosacral junction with active lumbar extension. Positive lumbar facet loading left worse than right. ASSESSMENT:   1. Stable for bilateral sacral lateral branch blocks to include medial branch blocks at L5-S1 as discussed. PLAN:  Proceed with scheduled procedure. Avelina Muhammad DO 8/28/2019 Time: [address] FOR PAIN MANAGEMENT    LUMBAR DORSAL RAMUS AND LATERAL BRANCH BLOCKS               PROCEDURE REPORT      PATIENT:  Chelo Quick OF BIRTH:  1945  DATE OF SERVICE:  8/28/2019  [facility]    PRE-PROCEDURE DIAGNOSIS:  M46.1, m47.817    POST-PROCEDURE DIAGNOSIS:  See Above                PROCEDURE:  1. Bilateral diagnostic lumbar L5 medial branch nerve block (91318, 50 )        2. Bilateral S1, S2 and S3 dorsal rami lateral branch nerve              blocks (64450 x3)        3. Fluoroscopic needle guidance, spinal (for the lateral branch blocks) (74348)            ANESTHESIA:  Local only. See Medication Administration Record for specific medications and dosage. COMPLICATIONS: None. PHYSICIAN:  Avelina Muhammad DO    PRE-PROCEDURE NOTE:  Pre-procedural assessment of the patient was performed including a limited history and physical examination. The details of the procedure were discussed with the patient, including the risks, benefits and alternative options and an informed consent was obtained.  The patients NPO status, if necessary for the specific procedure and/or administration of moderate intravenous sedation, if utilized, and availability of a responsible adult to escort the patient following the procedure were confirmed. PROCEDURE NOTE:  The patient was brought to the procedure suite and positioned on the fluoroscopy table in the prone position. Physiologic monitors were applied and supplemental oxygen was administered via nasal cannula. The skin was prepped in the standard surgical fashion and sterile drapes were applied over the procedure site. Please refer to the Flowsheet for documentation of the patients vital signs and the Medication Administration Report for any oral sedation administered prior to or during the procedure. 1% Lidocaine was utilized for local anesthesia. Under AP fluoroscopic guidance a 25-gauge, 3-1/2 inch short bevel spinal needle was advanced to the superior margin of the sacral ala to block the respective L5 medial branch nerve(s). Following this, a 25-gauge, 3-1/2 inch short bevel spinal needle was advanced to the lateral aspect of the S1 neural foramen and repeated in the same fashion at the S2 and S3 neural foramina. After all needles were placed, 1 mL of 0.2% ropivacaine was injected at each location after the negative aspiration of blood, air or CSF. The needles were removed and the stilets were replaced. The procedure was performed on the contralateral side in the same fashion and at the same levels using the same volume of local anesthetic following negative aspiration of blood, air or CSF. The needles were removed intact. The area was thoroughly cleaned and sterile bandages applied as necessary. The patient tolerated the procedure well and vital signs remained stable throughout the procedure. The patient was assessed immediately following the procedure and was noted to have greater  than 50% reduction in pain bilaterally. Based on these results, the patient is considered an appropriate  candidate for radiofrequency ablation of the above-mentioned medial and lateral branch nerves and should be scheduled for that procedure.   Please see the postprocedure telephone follow-up documentation for further details. POST-PROCEDURE COURSE:   The patient was escorted from the procedure suite in satisfactory condition and recovered per facility protocol based on the type of procedure performed and/or the sedation utilized. The patient did not experience any adverse events and remained hemodynamically stable during the post-procedure period. DISCHARGE NOTE:  Upon discharge, the patient was able to tolerate fluids and was in no acute distress. The patient was oriented to person, place and time and vital signs were stable. Appropriate post-procedure instructions were provided and explained to the patient in detail and all questions were answered.     Delmy Murray DO 8/28/2019 [id number]

## 2019-08-30 ENCOUNTER — TELEPHONE (OUTPATIENT)
Dept: PAIN MANAGEMENT | Age: 74
End: 2019-08-30

## 2019-08-30 NOTE — TELEPHONE ENCOUNTER
Ms. Cat Arora was contacted for follow-up status post Bilateral diagnostic lumbar L5 medial branch nerve block, and Bilateral S1, S2 and S3 dorsal rami lateral branch nerve  blocks on August 28, 2019.  She reports:    Pre-procedure numerical pain score: 6/10  Post-procedure numerical pain score immediately after: 2/10  Duration of relief post-procedure (if applicable): 6 Hours  Improvement in functional activities (if applicable): Yes  Percentage of overall improvement: 50%    COMMENTS:

## 2019-09-05 DIAGNOSIS — M54.50 CHRONIC BILATERAL LOW BACK PAIN WITHOUT SCIATICA: ICD-10-CM

## 2019-09-05 DIAGNOSIS — M62.830 SPASM OF BACK MUSCLES: ICD-10-CM

## 2019-09-05 DIAGNOSIS — G89.29 CHRONIC BILATERAL LOW BACK PAIN WITHOUT SCIATICA: ICD-10-CM

## 2019-09-05 DIAGNOSIS — M47.817 LUMBOSACRAL SPONDYLOSIS WITHOUT MYELOPATHY: ICD-10-CM

## 2019-09-05 DIAGNOSIS — M51.36 DDD (DEGENERATIVE DISC DISEASE), LUMBAR: ICD-10-CM

## 2019-09-05 DIAGNOSIS — M46.1 SACROILIITIS (HCC): ICD-10-CM

## 2019-09-05 DIAGNOSIS — G89.4 CHRONIC PAIN SYNDROME: ICD-10-CM

## 2019-09-05 DIAGNOSIS — Z79.899 ENCOUNTER FOR LONG-TERM (CURRENT) USE OF HIGH-RISK MEDICATION: Primary | ICD-10-CM

## 2019-09-05 RX ORDER — OXYCODONE HYDROCHLORIDE 10 MG/1
10 TABLET ORAL
Qty: 60 TAB | Refills: 0 | Status: SHIPPED | OUTPATIENT
Start: 2019-09-06 | End: 2019-10-06

## 2019-09-05 RX ORDER — NALOXONE HYDROCHLORIDE 4 MG/.1ML
SPRAY NASAL
Qty: 1 EACH | Refills: 0 | Status: SHIPPED | OUTPATIENT
Start: 2019-09-05

## 2019-09-05 NOTE — TELEPHONE ENCOUNTER
[facility] has called requesting a refill of their controlled medication, oxycodone IR, for the management of chronic pain. Last office visit date: 8/6/19  Last opioid care agreement 8/6/19  Last UDS was done 8/6/19    Date last  was pulled and reviewed : 9/5/19  Last fill date for medication was 8/7/19    Was the patient compliant when the above report was pulled? yes    Analgesia: 50%    Aberrancies: none    ADL's: yes    Adverse Reaction: none    Provider's last note and plan of care reviewed? yes  Request forwarded to provider for review.
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
## 2019-09-09 ENCOUNTER — APPOINTMENT (OUTPATIENT)
Dept: GENERAL RADIOLOGY | Age: 74
End: 2019-09-09
Attending: PHYSICAL MEDICINE & REHABILITATION
Payer: MEDICARE

## 2019-09-09 ENCOUNTER — HOSPITAL ENCOUNTER (OUTPATIENT)
Age: 74
Setting detail: OUTPATIENT SURGERY
Discharge: HOME | End: 2019-09-09
Attending: PHYSICAL MEDICINE & REHABILITATION | Admitting: PHYSICAL MEDICINE & REHABILITATION
Payer: MEDICARE

## 2019-09-09 VITALS
OXYGEN SATURATION: 100 % | HEIGHT: 59 IN | WEIGHT: 125 LBS | TEMPERATURE: 98.2 F | HEART RATE: 69 BPM | DIASTOLIC BLOOD PRESSURE: 73 MMHG | SYSTOLIC BLOOD PRESSURE: 147 MMHG | RESPIRATION RATE: 18 BRPM | BODY MASS INDEX: 25.2 KG/M2

## 2019-09-09 PROCEDURE — 76010000010 HC PAIN MGT 31 TO 60 MIN PROC: Performed by: PHYSICAL MEDICINE & REHABILITATION

## 2019-09-09 PROCEDURE — 74011250636 HC RX REV CODE- 250/636: Performed by: PHYSICAL MEDICINE & REHABILITATION

## 2019-09-09 PROCEDURE — 77030030797 HC PRB ENDOSC SINRGY AVNM -G: Performed by: PHYSICAL MEDICINE & REHABILITATION

## 2019-09-09 PROCEDURE — 74011250637 HC RX REV CODE- 250/637: Performed by: PHYSICAL MEDICINE & REHABILITATION

## 2019-09-09 PROCEDURE — 77030003666 HC NDL SPINAL BD -A: Performed by: PHYSICAL MEDICINE & REHABILITATION

## 2019-09-09 RX ORDER — LIDOCAINE HYDROCHLORIDE 10 MG/ML
INJECTION, SOLUTION EPIDURAL; INFILTRATION; INTRACAUDAL; PERINEURAL AS NEEDED
Status: DISCONTINUED | OUTPATIENT
Start: 2019-09-09 | End: 2019-09-09 | Stop reason: HOSPADM

## 2019-09-09 RX ORDER — LIDOCAINE HYDROCHLORIDE 20 MG/ML
INJECTION, SOLUTION EPIDURAL; INFILTRATION; INTRACAUDAL; PERINEURAL AS NEEDED
Status: DISCONTINUED | OUTPATIENT
Start: 2019-09-09 | End: 2019-09-09 | Stop reason: HOSPADM

## 2019-09-09 RX ORDER — DIAZEPAM 5 MG/1
5-20 TABLET ORAL ONCE
Status: COMPLETED | OUTPATIENT
Start: 2019-09-09 | End: 2019-09-09

## 2019-09-09 RX ADMIN — DIAZEPAM 5 MG: 5 TABLET ORAL at 13:48

## 2019-09-09 NOTE — DISCHARGE INSTRUCTIONS
[address] [place] [address] for Pain Management      Post Procedures Instructions    *Resume Diet and Activity as tolerated. Rest for the remainder of the day. *You may fell worse before you feel better as the numbing medications wear off before the steroids take effect if used for your procedures. *Do not use affected extremity until numbness or loss of sensation has completely resolved without assistance. *DO NOT DRIVE, operate machinery/heavey equipment for 24 hours. *DO NOT DRINK ALCOHOL for 24 hours as it may interact with the sedation if you received it and also thins your blood and may cause you to bleed. *WAIT 24 hours before starting back ANY Blood thinning medications:   (Heparin, Coumadin, Warfarin, Lovenox, Plavix, Aggrenox)    *Resume Pre-Procedure Medications as prescribed except Blood Thinners unless directed by your Physician or Cardiologist.     *Avoid Hot tubs and Heating pad for 24 hours to prevent dissipation of medications, you may shower to remove bandages and remaining prep residue on the skin. * If you develop a Headache, drink plenty of fluids including beverages with caffeine (Coffee, Mt. Dew etc.) and rest.  If the headache persists longer than 24 hoursor intensifies - Please call [facility] ([facility]) [phone]    * If you are DIABETIC, check your blood sugar three times a day for the next three days, the steroids will increase your blood sugar. If your blood sugar is greater than 400 have someone drive you to the nearest [address]. * If you experience any of the following problems, call the [facility] [phone] between 8:00 am - 4:30pm or After Hours [phone].     Shortness of breath    Fever of 101 F or higher    Nausea / Vomiting (not normal to you)    Increasing stiffness in the neck    Weakness or numbness in the arms or legs that is not resolving    Prolonged and increasing pain > than 4 days    ANYTHING OUT of the ORDINARY TO YOU    If YOU are experiencing a severe reaction / complication that you have never had before post procedure, call 911 or go to the nearest emergency room! All patients must have a  for transportation [place] regardless if you do or do not receive sedation. DISCHARGE SUMMARY from Nurse      PATIENT INSTRUCTIONS:    After Oral  or intravenous sedation, for 24 hours or while taking prescription Narcotics:  · Limit your activities  · Do not drive and operate hazardous machinery  · Do not make important personal or business decisions  · Do  not drink alcoholic beverages  · If you have not urinated within 8 hours after discharge, please contact your surgeon on call. Report the following to your surgeon:  · Excessive pain, swelling, redness or odor of or around the surgical area  · Temperature over 101  · Nausea and vomiting lasting longer than 4 hours or if unable to take medications  · Any signs of decreased circulation or nerve impairment to extremity: change in color, persistent  numbness, tingling, coldness or increase pain  · Any questions        What to do at Home:  Recommended activity: Activity as tolerated, NO DRIVING FOR 24 Hours post injection          *  Please give a list of your current medications to your Primary Care Provider. *  Please update this list whenever your medications are discontinued, doses are      changed, or new medications (including over-the-counter products) are added. *  Please carry medication information at all times in case of emergency situations. These are general instructions for a healthy lifestyle:    No smoking/ No tobacco products/ Avoid exposure to second hand smoke    Surgeon General's Warning:  Quitting smoking now greatly reduces serious risk to your health.     Obesity, smoking, and sedentary lifestyle greatly increases your risk for illness    A healthy diet, regular physical exercise & weight monitoring are important for maintaining a healthy lifestyle    You may be retaining fluid if you have a history of heart failure or if you experience any of the following symptoms:  Weight gain of 3 pounds or more overnight or 5 pounds in a week, increased swelling in our hands or feet or shortness of breath while lying flat in bed. Please call your doctor as soon as you notice any of these symptoms; do not wait until your next office visit. Recognize signs and symptoms of STROKE:    F-face looks uneven    A-arms unable to move or move unevenly    S-speech slurred or non-existent    T-time-call 911 as soon as signs and symptoms begin-DO NOT go       Back to bed or wait to see if you get better-TIME IS BRAIN.

## 2019-09-09 NOTE — PROCEDURES
[facility] for Pain Management  Brief Pre-Procedure History & Physical    PATIENT NAME:  Karissa Estrada   YOB: 1945  DATE OF SERVICE:  9/9/2019      CHIEF COMPLAINT:  Pain    HISTORY OF PRESENT ILLNESS:  Karissa Estrada presents today for a previously diagnosed problem contributing to some or all of this patients pain. The location and pattern of the pain has not changed substantially since the last visit in our office. No other significant medical changes have occurred in the last 30 days. PAST MEDICAL HISTORY:  The patient  has a past medical history of GERD (gastroesophageal reflux disease), Hiatal hernia, Hypertension, Nausea, Osteoarthritis, and Sacroiliitis ([address] PAST SURGICAL HISTORY:  The patient  has a past surgical history that includes hx knee replacement; hx appendectomy; hx cholecystectomy; and hx gyn. CURRENT MEDICATIONS:  See Medication Administration Record [facility]) in the patient's electronic record. ALLERGIES:    Allergies   Allergen Reactions    Codeine Nausea Only    Nsaids (Non-Steroidal Anti-Inflammatory Drug) Diarrhea     Abdominal pain    Sulfa (Sulfonamide Antibiotics) Hives       FAMILY HISTORY:  The patient family history includes Heart Disease in her father; Hypertension in her mother. SOCIAL HISTORY:  The patient  reports that she has never smoked. She has never used smokeless tobacco. The patient  has no alcohol history on file. She  has no drug history on file. REVIEW OF SYSTEMS:  Karissa Estrada denies any fever, chills, unexplained weight loss, use of antibiotics for recent infection or bleeding abnormalities. PHYSICAL EXAM:  VS:   Visit Vitals  /87 (BP 1 Location: Left arm, BP Patient Position: At rest;Sitting)   Pulse 67   Temp 98.2 °F (36.8 °C)   Resp 20   Ht 4' 11\" (1.499 m)   Wt 56.7 kg (125 lb)   SpO2 100%   BMI 25.25 kg/m²     Gen: Well-developed.  Body habitus consistent with recorded height and weight and the calculated BMI. No apparent distress. Head: Normocephalic, atraumatic. Skin: No obvious rashes, lesions or infection. Pulm: Respirations are even and unlabored. Psych:    Mood, affect and speech  Appropriate. ASSESSMENT:   1. Stable for left thermal radiofrequency ablation of the sacral lateral branches. As discussed. PLAN:  Proceed with scheduled procedure. Neto Corbett DO 9/9/2019 Time: [address] FOR PAIN MANAGEMENT    RADIOFREQUENCY NEUROLYSIS OF LUMBAR DORSAL RAMUS AND LATERAL BRANCH NERVES PROCEDURE REPORT      PATIENT:  Xiomy Aquino OF BIRTH:  1945  DATE OF SERVICE:  9/9/2019  [facility]    PRE-PROCEDURE DIAGNOSIS:  Sacroiliitis, m47.817, m51.36    POST-PROCEDURE DIAGNOSIS:  See Above                PROCEDURE:         1. Left radiofrequency neurolysis of lumbar dorsal ramus branch nerve, L5 (44953)  2. Left radiofrequency neurolysis of sacral lateral branch nerves, S1-3 (31991, [id number], [id number])  3. Fluoroscopic needle guidance, spinal (for lateral branch nerve neurolysis) (98699)    ANESTHESIA:  Local with oral sedation. See Medication Administration Record for specific medications and dosage. COMPLICATIONS: None. PHYSICIAN:  Neto Corbett DO    PRE-PROCEDURE NOTE:  Pre-procedural assessment of the patient was performed including a limited history and physical examination. The details of the procedure were discussed with the patient, including the risks, benefits and alternative options and an informed consent was obtained. The patients NPO status, if necessary for the specific procedure and/or administration of moderate intravenous sedation, if utilized, and availability of a responsible adult to escort the patient following the procedure were confirmed.     PROCEDURE NOTE:  The patient was brought to the procedure suite and positioned on the fluoroscopy table in the prone position. Physiologic monitors were applied and supplemental oxygen was administered via nasal cannula. The skin was prepped in the standard surgical fashion and sterile drapes were applied over the procedure site. Please refer to the Flowsheet for documentation of the patients vital signs and the Medication Administration Record for any oral and/or intravenous sedation administered prior to or during the procedure. 1% Lidocaine was utilized for local anesthesia. Under AP fluoroscopic guidance, a  (Lumbar-sacral) radiofrequency needle 7.5cm 17gauge radiofrequency needle with a 5.5 mm straight active tip was placed over the superior margin of the sacral ala to thermocoagulate the L5 dorsal ramus branch nerve. Correct needle tip position was confirmed with AP and lateral fluoroscopy. After the needle was placed, sensory and motor testing were performed, at 50 Hz and 2 Hz, respectively, which elicited ipsilateral deep local back discomfort without evidence of motor stimulation in the ipsilateral gluteal muscles or lower extremity. Following this, a similar radiofrequency needle was advanced to the lateral aspect of the S1 neural foramen approximating respective level lateral branches and repeated in the same fashion at the S2 and S3 neural foramina. Correct needle tip position was confirmed with AP fluoroscopy. After each individual needle was placed, sensory testing was performed, at 50 Hz. Following this, 1/2 mL of lidocaine 2% was injected after the negative aspiration of blood, air or CSF. Final correct needle placement was then confirmed by viewing each needle in AP  fluoroscopic views. Then, medial and/or lateral branch nerve radiofrequency neurolysis was then performed at each level for 2 and 1/2 minutes at 60° centigrade x 1 cycle before removing each needle intact. The area was thoroughly cleaned and sterile bandages applied as necessary.  The patient tolerated the procedure well without complication and the vital signs remained stable throughout the procedure. POST-PROCEDURE COURSE:   The patient was escorted from the procedure suite in satisfactory condition and recovered per facility protocol based on the type of procedure performed and/or the sedation utilized. The patient did not experience any adverse events and remained hemodynamically stable during the post-procedure period. DISCHARGE NOTE:  Upon discharge, the patient was able to tolerate fluids and was in no acute distress. The patient was oriented to person, place and time and vital signs were stable. Appropriate post-procedure instructions were provided and explained to the patient in detail and all questions were answered.     Neto Corbett DO 9/9/2019 1500

## 2019-09-16 DIAGNOSIS — G89.29 CHRONIC BILATERAL LOW BACK PAIN WITHOUT SCIATICA: ICD-10-CM

## 2019-09-16 DIAGNOSIS — G89.4 CHRONIC PAIN SYNDROME: ICD-10-CM

## 2019-09-16 DIAGNOSIS — M47.817 LUMBOSACRAL SPONDYLOSIS WITHOUT MYELOPATHY: ICD-10-CM

## 2019-09-16 DIAGNOSIS — M51.36 DDD (DEGENERATIVE DISC DISEASE), LUMBAR: ICD-10-CM

## 2019-09-16 DIAGNOSIS — M46.1 SACROILIITIS (HCC): ICD-10-CM

## 2019-09-16 DIAGNOSIS — M54.50 CHRONIC BILATERAL LOW BACK PAIN WITHOUT SCIATICA: ICD-10-CM

## 2019-09-16 DIAGNOSIS — M62.830 SPASM OF BACK MUSCLES: ICD-10-CM

## 2019-09-16 NOTE — TELEPHONE ENCOUNTER
Patient left message [id number]  3:37pm asking for refill of fentanyl patch    Darinel Holder  [id number]    [phone]    Medication - fentanyl patch    50%    ADL- YES    Side effects - none

## 2019-09-17 RX ORDER — FENTANYL 25 UG/1
1 PATCH TRANSDERMAL
Qty: 10 PATCH | Refills: 0 | Status: SHIPPED | OUTPATIENT
Start: 2019-09-17 | End: 2019-10-09 | Stop reason: SDUPTHER

## 2019-09-17 NOTE — TELEPHONE ENCOUNTER
Attempted to contact patient regarding prescription pick-up ; no answer noted at number given; unable to leave a message due to no name being left on the machine

## 2019-09-17 NOTE — TELEPHONE ENCOUNTER
[facility] requesting a refill of their controlled medication Fentanyl for the management of chronic pain syndrome . Last office visit date: 08/06/19  Last opioid care agreement 08/06/19  Last UDS was done 08/06/19    Date last  was pulled and reviewed : 09/17/19  Last fill date for medication was 08/15/19    Was the patient compliant when the above report was pulled? yes    Analgesia: 50%    Aberrancies: no    ADL's: yes     Adverse Reaction: no    Provider's last note and plan of care reviewed? yes  Request forwarded to provider for review.

## 2019-09-30 ENCOUNTER — HOSPITAL ENCOUNTER (OUTPATIENT)
Age: 74
Setting detail: OUTPATIENT SURGERY
Discharge: HOME | End: 2019-09-30
Attending: PHYSICAL MEDICINE & REHABILITATION | Admitting: PHYSICAL MEDICINE & REHABILITATION
Payer: MEDICARE

## 2019-09-30 ENCOUNTER — APPOINTMENT (OUTPATIENT)
Dept: GENERAL RADIOLOGY | Age: 74
End: 2019-09-30
Attending: PHYSICAL MEDICINE & REHABILITATION
Payer: MEDICARE

## 2019-09-30 VITALS
BODY MASS INDEX: 25.2 KG/M2 | SYSTOLIC BLOOD PRESSURE: 190 MMHG | TEMPERATURE: 98.1 F | HEIGHT: 59 IN | DIASTOLIC BLOOD PRESSURE: 82 MMHG | HEART RATE: 68 BPM | OXYGEN SATURATION: 99 % | WEIGHT: 125 LBS | RESPIRATION RATE: 16 BRPM

## 2019-09-30 PROCEDURE — 74011250637 HC RX REV CODE- 250/637: Performed by: PHYSICAL MEDICINE & REHABILITATION

## 2019-09-30 PROCEDURE — 74011000250 HC RX REV CODE- 250: Performed by: PHYSICAL MEDICINE & REHABILITATION

## 2019-09-30 PROCEDURE — 77030031505 HC PRB PAIN MGMT SNRGY BAYL -E: Performed by: PHYSICAL MEDICINE & REHABILITATION

## 2019-09-30 PROCEDURE — 77030030797 HC PRB ENDOSC SINRGY AVNM -G: Performed by: PHYSICAL MEDICINE & REHABILITATION

## 2019-09-30 PROCEDURE — 76010000010 HC PAIN MGT 31 TO 60 MIN PROC: Performed by: PHYSICAL MEDICINE & REHABILITATION

## 2019-09-30 RX ORDER — METOPROLOL TARTRATE 100 MG/1
100 TABLET ORAL ONCE
COMMUNITY

## 2019-09-30 RX ORDER — DIAZEPAM 5 MG/1
5-20 TABLET ORAL ONCE
Status: COMPLETED | OUTPATIENT
Start: 2019-09-30 | End: 2019-09-30

## 2019-09-30 RX ORDER — LIDOCAINE HYDROCHLORIDE 20 MG/ML
INJECTION, SOLUTION EPIDURAL; INFILTRATION; INTRACAUDAL; PERINEURAL AS NEEDED
Status: DISCONTINUED | OUTPATIENT
Start: 2019-09-30 | End: 2019-09-30 | Stop reason: HOSPADM

## 2019-09-30 RX ORDER — GABAPENTIN 600 MG/1
600 TABLET ORAL
COMMUNITY
End: 2019-11-08 | Stop reason: SDUPTHER

## 2019-09-30 RX ORDER — LIDOCAINE HYDROCHLORIDE 10 MG/ML
INJECTION, SOLUTION EPIDURAL; INFILTRATION; INTRACAUDAL; PERINEURAL AS NEEDED
Status: DISCONTINUED | OUTPATIENT
Start: 2019-09-30 | End: 2019-09-30 | Stop reason: HOSPADM

## 2019-09-30 RX ADMIN — DIAZEPAM 5 MG: 5 TABLET ORAL at 12:22

## 2019-09-30 NOTE — PROCEDURES
[facility] [address] for Pain Management  Brief Pre-Procedure History & Physical    PATIENT NAME:  Nieves Dias   YOB: 1945  DATE OF SERVICE:  9/30/2019      CHIEF COMPLAINT:  Pain    HISTORY OF PRESENT ILLNESS:  Nieves Dias presents today for a previously diagnosed problem contributing to some or all of this patients pain. The location and pattern of the pain has not changed substantially since the last visit in our office. No other significant medical changes have occurred in the last 30 days. PAST MEDICAL HISTORY:  The patient  has a past medical history of GERD (gastroesophageal reflux disease), Hiatal hernia, Hypertension, Nausea, Osteoarthritis, and Sacroiliitis ([address] PAST SURGICAL HISTORY:  The patient  has a past surgical history that includes hx knee replacement; hx appendectomy; hx cholecystectomy; and hx gyn. CURRENT MEDICATIONS:  See Medication Administration Record [facility]) in the patient's electronic record. ALLERGIES:    Allergies   Allergen Reactions    Codeine Nausea Only    Nsaids (Non-Steroidal Anti-Inflammatory Drug) Diarrhea     Abdominal pain    Sulfa (Sulfonamide Antibiotics) Hives       FAMILY HISTORY:  The patient family history includes Heart Disease in her father; Hypertension in her mother. SOCIAL HISTORY:  The patient  reports that she has never smoked. She has never used smokeless tobacco. The patient  has no alcohol history on file. She  has no drug history on file. REVIEW OF SYSTEMS:  Nieves Dias denies any fever, chills, unexplained weight loss, use of antibiotics for recent infection or bleeding abnormalities. PHYSICAL EXAM:  VS:   Visit Vitals  /76 (BP 1 Location: Left arm, BP Patient Position: At rest;Sitting)   Pulse 71   Temp 98.1 °F (36.7 °C)   Resp 18   Ht 4' 11\" (1.499 m)   Wt 56.7 kg (125 lb)   SpO2 97%   BMI 25.25 kg/m²     Gen: Well-developed.  Body habitus consistent with recorded height and weight and the calculated BMI. No apparent distress. Head: Normocephalic, atraumatic. Skin: No obvious rashes, lesions or infection. Pulm: Respirations are even and unlabored. Psych:    Mood, affect and speech  Appropriate. Tender to palpation overlying the right sacroiliac joint. ASSESSMENT:   1. Stable for right thermal radiofrequency ablation of the sacral lateral branches  as discussed. PLAN:  Proceed with scheduled procedure. Denisse Davis DO 9/30/2019 Time: [address] [place] [address] FOR PAIN MANAGEMENT    RADIOFREQUENCY NEUROLYSIS OF LUMBAR DORSAL RAMUS AND LATERAL BRANCH NERVES PROCEDURE REPORT      PATIENT:  Radha Davila OF BIRTH:  1945  DATE OF SERVICE:  9/30/2019  SITE:   [facility] Suite    PRE-PROCEDURE DIAGNOSIS:  Sacroiliitis, m47.817, m51.36    POST-PROCEDURE DIAGNOSIS:  See Above                PROCEDURE:         1. Right radiofrequency neurolysis of lumbar dorsal ramus branch nerve, L5 (51199)  2. Right radiofrequency neurolysis of sacral lateral branch nerves, S1-3 (89255, [id number], [id number])  3. Fluoroscopic needle guidance, spinal (for lateral branch nerve neurolysis) (51950)      ANESTHESIA:  Local with oral sedation. See Medication Administration Record for specific medications and dosage. COMPLICATIONS: None. PHYSICIAN:  Denisse Davis DO    PRE-PROCEDURE NOTE:  Pre-procedural assessment of the patient was performed including a limited history and physical examination. The details of the procedure were discussed with the patient, including the risks, benefits and alternative options and an informed consent was obtained. The patients NPO status, if necessary for the specific procedure and/or administration of moderate intravenous sedation, if utilized, and availability of a responsible adult to escort the patient following the procedure were confirmed.     PROCEDURE NOTE:  The patient was brought to the procedure suite and positioned on the fluoroscopy table in the prone position. Physiologic monitors were applied and supplemental oxygen was administered via nasal cannula. The skin was prepped in the standard surgical fashion and sterile drapes were applied over the procedure site. Please refer to the Flowsheet for documentation of the patients vital signs and the Medication Administration Record for any oral and/or intravenous sedation administered prior to or during the procedure. 1% Lidocaine was utilized for local anesthesia. Under AP fluoroscopic guidance, a (Lumbar-sacral) radiofrequency needle 7.5cm 17gauge radiofrequency needle with a 4 mm straight active tip was placed over the superior margin of the sacral ala to thermocoagulate the L5 dorsal ramus branch nerve. Correct needle tip position was confirmed with AP and lateral fluoroscopy. After the needle was placed, sensory and motor testing were performed, at 50 Hz and 2 Hz, respectively, which elicited ipsilateral deep local back discomfort without evidence of motor stimulation in the ipsilateral gluteal muscles or lower extremity. Following this, a similar radiofrequency needle was advanced to the lateral aspect of the S1 neural foramen approximating respective level lateral branches and repeated in the same fashion at the S2 and S3 neural foramina. Correct needle tip position was confirmed with AP fluoroscopy. After each individual needle was placed, sensory testing was performed, at 50 Hz. Following this, 1/2 mL of lidocaine 2% was injected after the negative aspiration of blood, air or CSF. Final correct needle placement was then confirmed by viewing each needle in AP  fluoroscopic views. Then, medial and/or lateral branch nerve radiofrequency neurolysis was then performed at each level for 2 and 1/2 minutes at 60° centigrade x 1 cycle before removing each needle intact.   The area was thoroughly cleaned and sterile bandages applied as necessary. The patient tolerated the procedure well without complication and the vital signs remained stable throughout the procedure. POST-PROCEDURE COURSE:   The patient was escorted from the procedure suite in satisfactory condition and recovered per facility protocol based on the type of procedure performed and/or the sedation utilized. The patient did not experience any adverse events and remained hemodynamically stable during the post-procedure period. DISCHARGE NOTE:  Upon discharge, the patient was able to tolerate fluids and was in no acute distress. The patient was oriented to person, place and time and vital signs were stable. Appropriate post-procedure instructions were provided and explained to the patient in detail and all questions were answered.     Wapello Signs, DO 9/30/2019 1346

## 2019-09-30 NOTE — DISCHARGE INSTRUCTIONS
[address] [place] [address] for Pain Management      Post Procedures Instructions    *Resume Diet and Activity as tolerated. Rest for the remainder of the day. *You may fell worse before you feel better as the numbing medications wear off before the steroids take effect if used for your procedures. *Do not use affected extremity until numbness or loss of sensation has completely resolved without assistance. *DO NOT DRIVE, operate machinery/heavey equipment for 24 hours. *DO NOT DRINK ALCOHOL for 24 hours as it may interact with the sedation if you received it and also thins your blood and may cause you to bleed. *WAIT 24 hours before starting back ANY Blood thinning medications:   (Heparin, Coumadin, Warfarin, Lovenox, Plavix, Aggrenox)    *Resume Pre-Procedure Medications as prescribed except Blood Thinners unless directed by your Physician or Cardiologist.     *Avoid Hot tubs and Heating pad for 24 hours to prevent dissipation of medications, you may shower to remove bandages and remaining prep residue on the skin. * If you develop a Headache, drink plenty of fluids including beverages with caffeine (Coffee, Mt. Dew etc.) and rest.  If the headache persists longer than 24 hoursor intensifies - Please call [facility] [phone]    * If you are DIABETIC, check your blood sugar three times a day for the next three days, the steroids will increase your blood sugar. If your blood sugar is greater than 400 have someone drive you to the nearest [address]. * If you experience any of the following problems, call the [facility] [phone] between 8:00 am - 4:30pm or After Hours [phone].     Shortness of breath    Fever of 101 F or higher    Nausea / Vomiting (not normal to you)    Increasing stiffness in the neck    Weakness or numbness in the arms or legs that is not resolving    Prolonged and increasing pain > than 4 days    ANYTHING OUT of the ORDINARY TO YOU    If YOU are experiencing a severe reaction / complication that you have never had before post procedure, call 911 or go to the nearest emergency room! All patients must have a  for transportation [place] regardless if you do or do not receive sedation. DISCHARGE SUMMARY from Nurse      PATIENT INSTRUCTIONS:    After Oral  or intravenous sedation, for 24 hours or while taking prescription Narcotics:  · Limit your activities  · Do not drive and operate hazardous machinery  · Do not make important personal or business decisions  · Do  not drink alcoholic beverages  · If you have not urinated within 8 hours after discharge, please contact your surgeon on call. Report the following to your surgeon:  · Excessive pain, swelling, redness or odor of or around the surgical area  · Temperature over 101  · Nausea and vomiting lasting longer than 4 hours or if unable to take medications  · Any signs of decreased circulation or nerve impairment to extremity: change in color, persistent  numbness, tingling, coldness or increase pain  · Any questions        What to do at Home:  Recommended activity: Activity as tolerated, NO DRIVING FOR 24 Hours post injection          *  Please give a list of your current medications to your Primary Care Provider. *  Please update this list whenever your medications are discontinued, doses are      changed, or new medications (including over-the-counter products) are added. *  Please carry medication information at all times in case of emergency situations. These are general instructions for a healthy lifestyle:    No smoking/ No tobacco products/ Avoid exposure to second hand smoke    Surgeon General's Warning:  Quitting smoking now greatly reduces serious risk to your health.     Obesity, smoking, and sedentary lifestyle greatly increases your risk for illness    A healthy diet, regular physical exercise & weight monitoring are important for maintaining a healthy lifestyle    You may be retaining fluid if you have a history of heart failure or if you experience any of the following symptoms:  Weight gain of 3 pounds or more overnight or 5 pounds in a week, increased swelling in our hands or feet or shortness of breath while lying flat in bed. Please call your doctor as soon as you notice any of these symptoms; do not wait until your next office visit. Recognize signs and symptoms of STROKE:    F-face looks uneven    A-arms unable to move or move unevenly    S-speech slurred or non-existent    T-time-call 911 as soon as signs and symptoms begin-DO NOT go       Back to bed or wait to see if you get better-TIME IS BRAIN.

## 2019-10-08 DIAGNOSIS — M47.817 LUMBOSACRAL SPONDYLOSIS WITHOUT MYELOPATHY: ICD-10-CM

## 2019-10-08 DIAGNOSIS — M62.830 SPASM OF BACK MUSCLES: ICD-10-CM

## 2019-10-08 DIAGNOSIS — M54.50 CHRONIC BILATERAL LOW BACK PAIN WITHOUT SCIATICA: ICD-10-CM

## 2019-10-08 DIAGNOSIS — M51.36 DDD (DEGENERATIVE DISC DISEASE), LUMBAR: ICD-10-CM

## 2019-10-08 DIAGNOSIS — M46.1 SACROILIITIS (HCC): Primary | ICD-10-CM

## 2019-10-08 DIAGNOSIS — G89.29 CHRONIC BILATERAL LOW BACK PAIN WITHOUT SCIATICA: ICD-10-CM

## 2019-10-08 DIAGNOSIS — G89.4 CHRONIC PAIN SYNDROME: ICD-10-CM

## 2019-10-08 RX ORDER — FENTANYL 25 UG/1
1 PATCH TRANSDERMAL
Qty: 10 PATCH | Refills: 0 | Status: SHIPPED | OUTPATIENT
Start: 2019-10-18 | End: 2019-10-09 | Stop reason: SDUPTHER

## 2019-10-08 RX ORDER — OXYCODONE HYDROCHLORIDE 10 MG/1
10 TABLET ORAL 2 TIMES DAILY
Qty: 60 TAB | Refills: 0 | Status: SHIPPED | OUTPATIENT
Start: 2019-10-08 | End: 2019-11-08 | Stop reason: SDUPTHER

## 2019-10-09 RX ORDER — FENTANYL 25 UG/1
1 PATCH TRANSDERMAL
Qty: 10 PATCH | Refills: 0 | Status: SHIPPED | OUTPATIENT
Start: 2019-10-09 | End: 2019-11-08 | Stop reason: CLARIF

## 2019-10-23 DIAGNOSIS — M47.817 LUMBOSACRAL SPONDYLOSIS WITHOUT MYELOPATHY: ICD-10-CM

## 2019-10-23 DIAGNOSIS — M46.1 SACROILIITIS (HCC): ICD-10-CM

## 2019-10-23 DIAGNOSIS — G89.4 CHRONIC PAIN SYNDROME: ICD-10-CM

## 2019-10-23 DIAGNOSIS — M51.36 DDD (DEGENERATIVE DISC DISEASE), LUMBAR: ICD-10-CM

## 2019-10-23 DIAGNOSIS — M62.830 SPASM OF BACK MUSCLES: ICD-10-CM

## 2019-10-23 DIAGNOSIS — G89.29 CHRONIC BILATERAL LOW BACK PAIN WITHOUT SCIATICA: ICD-10-CM

## 2019-10-23 DIAGNOSIS — M54.50 CHRONIC BILATERAL LOW BACK PAIN WITHOUT SCIATICA: ICD-10-CM

## 2019-10-23 RX ORDER — GABAPENTIN 600 MG/1
600 TABLET ORAL
Qty: 90 TAB | Refills: 1 | Status: CANCELLED | OUTPATIENT
Start: 2019-10-23

## 2019-10-23 RX ORDER — GABAPENTIN 600 MG/1
600 TABLET ORAL 3 TIMES DAILY
Qty: 90 TAB | Refills: 1 | Status: SHIPPED | OUTPATIENT
Start: 2019-10-23 | End: 2019-11-22

## 2019-11-08 ENCOUNTER — OFFICE VISIT (OUTPATIENT)
Dept: PAIN MANAGEMENT | Age: 74
End: 2019-11-08

## 2019-11-08 VITALS
DIASTOLIC BLOOD PRESSURE: 76 MMHG | OXYGEN SATURATION: 98 % | SYSTOLIC BLOOD PRESSURE: 139 MMHG | BODY MASS INDEX: 25.2 KG/M2 | RESPIRATION RATE: 14 BRPM | HEART RATE: 64 BPM | WEIGHT: 125 LBS | HEIGHT: 59 IN | TEMPERATURE: 97.9 F

## 2019-11-08 DIAGNOSIS — M47.817 LUMBOSACRAL SPONDYLOSIS WITHOUT MYELOPATHY: ICD-10-CM

## 2019-11-08 DIAGNOSIS — M54.50 CHRONIC BILATERAL LOW BACK PAIN WITHOUT SCIATICA: Primary | ICD-10-CM

## 2019-11-08 DIAGNOSIS — G89.29 CHRONIC BILATERAL LOW BACK PAIN WITHOUT SCIATICA: Primary | ICD-10-CM

## 2019-11-08 DIAGNOSIS — M62.830 SPASM OF BACK MUSCLES: ICD-10-CM

## 2019-11-08 DIAGNOSIS — Z79.899 ENCOUNTER FOR LONG-TERM (CURRENT) USE OF HIGH-RISK MEDICATION: ICD-10-CM

## 2019-11-08 DIAGNOSIS — M51.36 DDD (DEGENERATIVE DISC DISEASE), LUMBAR: ICD-10-CM

## 2019-11-08 DIAGNOSIS — M46.1 SACROILIITIS (HCC): ICD-10-CM

## 2019-11-08 DIAGNOSIS — G89.4 CHRONIC PAIN SYNDROME: ICD-10-CM

## 2019-11-08 RX ORDER — GABAPENTIN 100 MG/1
100 CAPSULE ORAL 2 TIMES DAILY
Qty: 60 CAP | Refills: 2 | Status: SHIPPED | OUTPATIENT
Start: 2019-11-08 | End: 2019-12-08

## 2019-11-08 RX ORDER — FENTANYL 12.5 UG/1
1 PATCH TRANSDERMAL
Qty: 10 PATCH | Refills: 0 | Status: SHIPPED | OUTPATIENT
Start: 2019-11-19 | End: 2019-12-06 | Stop reason: CLARIF

## 2019-11-08 RX ORDER — OXYCODONE HYDROCHLORIDE 10 MG/1
10 TABLET ORAL 2 TIMES DAILY
Qty: 60 TAB | Refills: 0 | Status: SHIPPED | OUTPATIENT
Start: 2019-11-11 | End: 2019-12-06 | Stop reason: SDUPTHER

## 2019-11-08 RX ORDER — ESZOPICLONE 3 MG/1
3 TABLET, FILM COATED ORAL
COMMUNITY
Start: 2019-10-06

## 2019-11-08 NOTE — PATIENT INSTRUCTIONS

## 2019-11-08 NOTE — PROGRESS NOTES
Nursing Notes    Patient presents to the office today in follow-up. Patient rates her pain at 4/10 on the numerical pain scale. Reviewed medications with counts as follows:    Rx Date filled Qty Dispensed Pill Count Last Dose Short   Fentanyl 25 mcg 10/20/19 10 5+1 on Thursday no   Oxycodone 10 mg 10/12/19 60 9 This am  no        reviewed YES  Any aberrancies noted on  NO  Last opioid agreement 08/06/19  Last urine drug screen 08/06/19    Comments: Patient is here today for a follow up appt today for her chronic low back pain. She states her pain level today is a 4  3 most recent PHQ Screens 11/8/2019   Little interest or pleasure in doing things Not at all   Feeling down, depressed, irritable, or hopeless Not at all   Total Score PHQ 2 0       Pt has seen her Ortho MD and had injections with Yolis Congress she had imaging done for her injections with JA    POC UDS was not performed in office today    Any new labs or imaging since last appointment? NO    Have you been to an emergency room (ER) or urgent care clinic since your last visit? NO            Have you been hospitalized since your last visit? NO     If yes, where, when, and reason for visit? Have you seen or consulted any other health care providers outside of the [address] [place] [address]  since your last visit? NO     If yes, where, when, and reason for visit? Ms. Jayesh Daily has a reminder for a \"due or due soon\" health maintenance. I have asked that she contact her primary care provider for follow-up on this health maintenance.

## 2019-11-08 NOTE — PROGRESS NOTES
Referral date 7+ years ago, source ? ? and for chronic lower back pain. Today   Last Visit Prior Visit(s)  ORT - n/a  n/a  n/a  n/a  1  [facility] - 1 & 6  1 & 10  1 & 9  1 & 9  5 & 3  [facility] - 48%  58%  50%  44%  54%  COMM - 3  6  1  2  6    Calculated MEQ - 90  Naloxone rescue - yes  Prophylactic bowel program - yes  Date of last OCA 8/6/19  Last UDS 8/6/19, consistent  Prior UDS 2/12/19, consistent   date checked today, findings consistent - nurse Jean Carlos Ga called her pharmacy to verify , script with fill date 10/19/19 and written date 10/9/19 was done in error by patients pharmacy. HPI:  Rita Winn is a 68 y.o. female here for f/u visit for ongoing evaluation of chronic lower back pain. Pt was last seen here on 8/6/19. Pt denies interval changes on the character or distribution of her chronic pain. Pain is located lower lumbar belt line region, neck, mid back, legs and feet. The pain is described as stabbing, aching, burning and numbness. She also has \"muslce spasms\" in her lower back left side. Pain at its best is 4/10. Pain at its worse is 8/10. Pain on average is 6/10. The pain is worsened by driving, \"moving a certain way\", prolonged sitting, prolonged standing and prolonged walking. Symptoms are improved by frequent positional changes, sleep, elevating legs, Lidoderm patches, biofreeze topical cream, Zanaflex, tylenol, opioid medications, Gabapentin, heat and massage. Pt has tried PT and in the past with no perceived benefit. She has not tried aquatic PT, acupuncture, chiropractor, Cymbalta, TCA, Topamax, H-wave, Qutenza or NexWave E-stem device. Since last visit, she reports multiple issues going on including needing left ankle fusion and a scoliosis corrective surgery but she does not want either to be done. She continues to have HA with neck pain that improved with injections done by Dr Elizabeth Horton at [facility]; pt states she was told she has cervical stenosis.       Interventional Pain Procedures:  9/30/19 - Right L5, S1-3 lumbar RFA with Dr Rick Goncalves  9/9/19 - Left L5, S1-3 lumbar RFA with Dr Rick Goncalves  8/28/19 - bilateral L5 Lumbar MBB/bilateral S1-S3 sacral MMB with Dr Rick Goncalves  5/30/17 - Left L5, S1-S3 lumbar RFA with Dr Leigh Witt  5/15/17 - Right L5, S1-S3 lumbar RFA with Dr Leigh Witt  4/19/17 - bilateral L5 Lumbar MBB/bilateral S1-S3 sacral MMB with Dr Ivan Sheridan Marital status:      Spouse name: Not on file    Number of children: Not on file    Years of education: Not on file    Highest education level: Not on file   Occupational History    Not on file   Social Needs    Financial resource strain: Not on file    Food insecurity:     Worry: Not on file     Inability: Not on file    Transportation needs:     Medical: Not on file     Non-medical: Not on file   Tobacco Use    Smoking status: Never Smoker    Smokeless tobacco: Never Used   Substance and Sexual Activity    Alcohol use: Not on file    Drug use: Not on file    Sexual activity: Yes     Birth control/protection: Surgical     Comment: BTL   Lifestyle    Physical activity:     Days per week: Not on file     Minutes per session: Not on file    Stress: Not on file   Relationships    Social connections:     Talks on phone: Not on file     Gets together: Not on file     Attends Congregation service: Not on file     Active member of club or organization: Not on file     Attends meetings of clubs or organizations: Not on file     Relationship status: Not on file    Intimate partner violence:     Fear of current or ex partner: Not on file     Emotionally abused: Not on file     Physically abused: Not on file     Forced sexual activity: Not on file   Other Topics Concern    Not on file   Social History Narrative    Not on file     Family History   Problem Relation Age of Onset    Hypertension Mother     Heart Disease Father     Diabetes Neg Hx      Allergies   Allergen Reactions    Codeine Nausea Only    Nsaids (Non-Steroidal Anti-Inflammatory Drug) Diarrhea     Abdominal pain    Sulfa (Sulfonamide Antibiotics) Hives     Past Medical History:   Diagnosis Date    GERD (gastroesophageal reflux disease)     Hiatal hernia     Hypertension     Nausea     Osteoarthritis     Sacroiliitis (HCC)      Past Surgical History:   Procedure Laterality Date    HX APPENDECTOMY      HX CHOLECYSTECTOMY      HX GYN      uterine surgery    HX KNEE REPLACEMENT       Current Outpatient Medications on File Prior to Visit   Medication Sig    glucosamine/chondr bragg A sod (OSTEO BI-FLEX PO) Take  by mouth.  Lactobac no.41/Bifidobact no.7 (PROBIOTIC-10 PO) Take  by mouth.  gabapentin (NEURONTIN) 600 mg tablet Take 1 Tab by mouth three (3) times daily for 30 days. Max Daily Amount: 1,800 mg.    metoprolol tartrate (LOPRESSOR) 100 mg IR tablet Take 100 mg by mouth once.  naloxone (NARCAN) 4 mg/actuation nasal spray Use 1 spray intranasally into 1 nostril as needed for opioid respiratory emergency only.  hydroCHLOROthiazide (HYDRODIURIL) 25 mg tablet Take 25 mg by mouth daily.  tiZANidine (ZANAFLEX) 4 mg tablet Take 1-2 Tabs by mouth three (3) times daily. As needed for muscle spasms (Patient taking differently: Take 4-8 mg by mouth nightly. As needed for muscle spasms)    senna (SENNA) 8.6 mg tablet Take 1 Tab by mouth daily as needed for Constipation.  lisinopril (PRINIVIL, ZESTRIL) 20 mg tablet Take 40 mg by mouth daily.  ESOMEPRAZOLE MAG TRIHYDRATE (NEXIUM PO) Take 40 mg by mouth daily.  eszopiclone (LUNESTA) 3 mg tablet Take 3 mg by mouth nightly.  [DISCONTINUED] fentaNYL (DURAGESIC) 25 mcg/hr PATCH 1 Patch by TransDERmal route every seventy-two (72) hours for 30 days. Max Daily Amount: 1 Patch. Indications: chronic pain with narcotic drug tolerance    [DISCONTINUED] oxyCODONE IR (ROXICODONE) 10 mg tab immediate release tablet Take 1 Tab by mouth two (2) times a day for 30 days.  Max Daily Amount: 20 mg.   Mickie Corey [DISCONTINUED] gabapentin (NEURONTIN) 600 mg tablet Take 600 mg by mouth three (3) times daily as needed.  hydrOXYzine HCl (ATARAX) 25 mg tablet Take 1 Tab by mouth every eight (8) hours as needed for Itching (anxiety, pruritis, irritation, itching) for up to 12 doses.  cloNIDine HCl (CATAPRES) 0.1 mg tablet Take 1 Tab by mouth every twelve (12) hours as needed (sweating, restlessness, hot flashes) for up to 12 doses.  naloxone 4 mg/actuation spry 4 mg by Nasal route once as needed (for opioid overdose) for up to 1 dose. Indications: OPIOID TOXICITY     No current facility-administered medications on file prior to visit. ROS:  Denies fever, chills, nausea, vomiting, diarrhea, constipation, chest pain, shortness or breath/trouble breathing, abdominal pain, trouble swallowing, changes in vision, changes in hearing, falls, weakness, dizziness, bladder incontinence, bowel incontinence, depression, anxiety, suicidal ideations, homicidal ideations or alcohol use. Opioid specific risk: GERD, MAHAMED on CPAP      Vitals:    11/08/19 1025   BP: 139/76   Pulse: 64   Resp: 14   Temp: 97.9 °F (36.6 °C)   SpO2: 98%   Weight: 56.7 kg (125 lb)   Height: 4' 11\" (1.499 m)   PainSc:   4   PainLoc: Back          Physical exam:  AFVSS, no acute distress, normal body habitus. A&OXs 3. Normocephalic, atraumatic. Conjugate gaze, clear sclerae. Speech is clear and appropriate. Mood is appropriate and patient is cooperative. Gait and balance are within functional limits. Non-labored breathing. Primary Care Physician  Mabel Moreno  [facility] [address] [facility] [address] -- .  3 most recent PHQ Screens 11/8/2019   Little interest or pleasure in doing things Not at all   Feeling down, depressed, irritable, or hopeless Not at all   Total Score PHQ 2 0         Assessment/Plan:     ICD-10-CM ICD-9-CM    1.  Chronic bilateral low back pain without sciatica M54.5 724.2 fentaNYL (DURAGESIC) 12 mcg/hr patch    G89.29 338.29 oxyCODONE IR (ROXICODONE) 10 mg tab immediate release tablet      gabapentin (NEURONTIN) 100 mg capsule      lidocaine (ZTLIDO) 1.8 % ptmd      AMB SUPPLY ORDER      REFERRAL TO PAIN MANAGEMENT   2. Sacroiliitis (HCC) M46.1 720.2 fentaNYL (DURAGESIC) 12 mcg/hr patch      oxyCODONE IR (ROXICODONE) 10 mg tab immediate release tablet      gabapentin (NEURONTIN) 100 mg capsule      lidocaine (ZTLIDO) 1.8 % ptmd      AMB SUPPLY ORDER      REFERRAL TO PAIN MANAGEMENT   3. Lumbosacral spondylosis without myelopathy M47.817 721.3 fentaNYL (DURAGESIC) 12 mcg/hr patch      oxyCODONE IR (ROXICODONE) 10 mg tab immediate release tablet      gabapentin (NEURONTIN) 100 mg capsule      lidocaine (ZTLIDO) 1.8 % ptmd      AMB SUPPLY ORDER      REFERRAL TO PAIN MANAGEMENT   4. DDD (degenerative disc disease), lumbar M51.36 722.52 fentaNYL (DURAGESIC) 12 mcg/hr patch      oxyCODONE IR (ROXICODONE) 10 mg tab immediate release tablet      gabapentin (NEURONTIN) 100 mg capsule      lidocaine (ZTLIDO) 1.8 % ptmd      AMB SUPPLY ORDER      REFERRAL TO PAIN MANAGEMENT   5. Spasm of back muscles M62.830 724.8 fentaNYL (DURAGESIC) 12 mcg/hr patch      oxyCODONE IR (ROXICODONE) 10 mg tab immediate release tablet      gabapentin (NEURONTIN) 100 mg capsule      lidocaine (ZTLIDO) 1.8 % ptmd      AMB SUPPLY ORDER      REFERRAL TO PAIN MANAGEMENT   6. Chronic pain syndrome G89.4 338.4 fentaNYL (DURAGESIC) 12 mcg/hr patch      oxyCODONE IR (ROXICODONE) 10 mg tab immediate release tablet      gabapentin (NEURONTIN) 100 mg capsule      lidocaine (ZTLIDO) 1.8 % ptmd      AMB SUPPLY ORDER      REFERRAL TO PAIN MANAGEMENT   7. Encounter for long-term (current) use of high-risk medication Z79.899 V58.69         1) Medications (opiod and non-opiod)  --I do not recommend long term opioid therapy for this patient at this time; risks outweigh potential benefits.  Pt currently taking fentanyl 25mcg/hr patch changed every 72 hours and oxycodone IR 10mg up to 2 times a day as needed with a total of 60 tabs to be used over 30 daysas needed. Their MME is 90. --Today, we will continue the weaning of patients opioid medication with a goal of being opioid free, pending safety and compliance. Patient was educated on signs and symptoms of opioid withdrawal and advised to call the clinic should these symptoms arise so that we may provide support as needed. She is provided with Fentanyl 12mcg/hr patch changed every 72 hours and oxycodone IR 10mg up to 2 times a day as needed with a total of 60 tabs to be used over 30 days. Her new MME will be 59. --At next office visit in 1 month, she will be provided with oxycodone IR 10mg up to 3 times a day as needed with a total of 90 tabs to be used over 30 days. Their new MME will be 45. Fentanyl will be discontinued. If patient has difficulty with the wean or difficulty with cravings we will consider referral to mental health for ongoing assessment and treatment for opioid use disorder. She was informed of the planned clinic closure of 12/12/19 and that this will be the last prescription of narcotic provided from this clinic. She was referred to Dr Chapito Shah for continuing of care. She was advised to call her primary care provider to inform them of the planned clinic closure so that they may anticipate providing support for her pain while awaiting establishment at the new clinic. --Will try ZTLido patches to see if they can provide her with better patch adherence. --Pt states taking Gabapentin three times a day makes her too sleepy. We will keep it with BID dosing but will provide her with 100mg capsules to take with her current 600mg capsules to see if this can provide her better pain control. Goal would be 900mg TID as needed and as tolerated. --Continue Zanaflex as previously recommended. Pt states she only takes this at night as it makes her tired.  She declined trial an alternative muscle relaxer at last visit. --Continue Tylenol optimization; max of 3000mg acetaminophen per 24 hour period from all sources. 2) Restorative Therapies  --H-wave therapy trial ordered today as I think she would significantly benefit from this. Information handouts provided to her. --Pt would benefit from aquatic PT, acupuncture therapy and/or Qutenza patch in the future. --Pt states she is planning on trying chair yoga with her . 3) Interventional Procedures  --Pt states she is still having benefit from RFA procedure done recently with Dr Jose Romeo. 4) Behavorial Health Approaches  --Pt would benefit from referral to pain psychology for training and cognitive behavorial therapy, acceptance commitment therapy, biofeedback and mindfulness mediation and other modalities as indicated for treatment of chronic pain once this service is available at our clinic. She declined referral to Dr Leopold Hero at last visit. 5) Complementary & Integrative Health  --Follow up ongoing assessment and ongoing development of integrative and comprehensive plan of care for chronic pain. Goals: To establish complementary and integrative plan of care to address chronic pain issues while minimizing pharmaceuticals to maximize patient's function improve quality of life. Education:  Patient again educated on the importance of strict compliance with the opioid care agreement while on opioid therapy. Patient also again educated that they should avoid driving while on chronic opioid therapy. Also advised to avoid alcohol and to avoid benzodiazepines while on opioid therapy. Handouts given regarding opioid safety. Follow-up and Dispositions    · Return in about 1 month (around 12/8/2019) for 30 min. [address] was used for portions of this report. Unintended errors may occur.

## 2019-12-06 ENCOUNTER — OFFICE VISIT (OUTPATIENT)
Dept: PAIN MANAGEMENT | Age: 74
End: 2019-12-06

## 2019-12-06 VITALS
RESPIRATION RATE: 18 BRPM | SYSTOLIC BLOOD PRESSURE: 156 MMHG | TEMPERATURE: 97.2 F | OXYGEN SATURATION: 97 % | BODY MASS INDEX: 25.2 KG/M2 | WEIGHT: 125 LBS | HEART RATE: 65 BPM | DIASTOLIC BLOOD PRESSURE: 80 MMHG | HEIGHT: 59 IN

## 2019-12-06 DIAGNOSIS — M46.1 SACROILIITIS (HCC): ICD-10-CM

## 2019-12-06 DIAGNOSIS — M62.830 SPASM OF BACK MUSCLES: ICD-10-CM

## 2019-12-06 DIAGNOSIS — M54.50 CHRONIC BILATERAL LOW BACK PAIN WITHOUT SCIATICA: Primary | ICD-10-CM

## 2019-12-06 DIAGNOSIS — G89.4 CHRONIC PAIN SYNDROME: ICD-10-CM

## 2019-12-06 DIAGNOSIS — Z79.899 ENCOUNTER FOR LONG-TERM (CURRENT) USE OF HIGH-RISK MEDICATION: ICD-10-CM

## 2019-12-06 DIAGNOSIS — M51.36 DDD (DEGENERATIVE DISC DISEASE), LUMBAR: ICD-10-CM

## 2019-12-06 DIAGNOSIS — G89.29 CHRONIC BILATERAL LOW BACK PAIN WITHOUT SCIATICA: Primary | ICD-10-CM

## 2019-12-06 DIAGNOSIS — M47.817 LUMBOSACRAL SPONDYLOSIS WITHOUT MYELOPATHY: ICD-10-CM

## 2019-12-06 RX ORDER — PROMETHAZINE HYDROCHLORIDE 12.5 MG/1
12.5 TABLET ORAL
Qty: 12 TAB | Refills: 0 | Status: SHIPPED | OUTPATIENT
Start: 2019-12-06

## 2019-12-06 RX ORDER — CLONIDINE HYDROCHLORIDE 0.1 MG/1
0.1 TABLET ORAL
Qty: 12 TAB | Refills: 0 | Status: SHIPPED | OUTPATIENT
Start: 2019-12-06

## 2019-12-06 RX ORDER — OXYCODONE HYDROCHLORIDE 10 MG/1
10 TABLET ORAL
Qty: 90 TAB | Refills: 0 | Status: SHIPPED | OUTPATIENT
Start: 2019-12-06 | End: 2020-01-05

## 2019-12-06 RX ORDER — HYDROXYZINE 25 MG/1
25 TABLET, FILM COATED ORAL
Qty: 12 TAB | Refills: 0 | Status: SHIPPED | OUTPATIENT
Start: 2019-12-06

## 2019-12-06 NOTE — PROGRESS NOTES
Nursing Notes    Patient presents to the office today in follow-up. Patient rates her pain at 4/10 on the numerical pain scale. Reviewed medications with counts as follows:    Rx Date filled Qty Dispensed Pill Count Last Dose Short   Fentanyl 12 mcg/hr 11/20/19 10 7+1 on  Current patch on right upper back no   Oxycodone  11/11/19 60 7 This a.m.  reviewed YES  Any aberrancies noted on  NO  Last opioid agreement 08/06/19  Last urine drug screen 08/06/19    Comments:     POC UDS was not performed in office today. Any new labs or imaging since last appointment? NO    Have you been to an emergency room (ER) or urgent care clinic since your last visit? NO            Have you been hospitalized since your last visit? NO     If yes, where, when, and reason for visit? Have you seen or consulted any other health care providers outside of the [address] [place] [address]  since your last visit? NO     If yes, where, when, and reason for visit? Ms. Hali Nicholas has a reminder for a \"due or due soon\" health maintenance. I have asked that she contact her primary care provider for follow-up on this health maintenance.

## 2019-12-06 NOTE — PROGRESS NOTES
Referral date 7+ years ago, source ?? and for chronic lower back pain. Calculated MEQ - 60  Naloxone rescue - yes  Prophylactic bowel program - yes  Date of last OCA 8/6/19  Last UDS 8/6/19, consistent    date checked today, findings consistent      Today   PGIC - incompete  VIDA - 40%  COMM - 2      HPI:  Nestor Egan is a 69 y.o. female here for f/u visit for ongoing evaluation of chronic lower back pain. Pt was last seen here on 11/8/19. Pt denies interval changes on the character or distribution of chronic pain. Pain is located lower lumbar belt line region, neck, mid back, legs and feet. The pain is described as stabbing, aching, burning and numbness. She also has \"muslce spasms\" in her lower back left side. Pain at its best is 3/10. Pain at its worse is 8/10. Pain on average is 5/10. The pain is worsened by driving, \"moving a certain way\", prolonged sitting, prolonged standing and prolonged walking. Symptoms are improved by frequent positional changes, sleep, elevating legs, Lidoderm patches, biofreeze topical cream, Zanaflex, tylenol, opioid medications, Gabapentin, heat and massage. Pt has tried PT and in the past with no perceived benefit. She has not tried aquatic PT, acupuncture, chiropractor, Cymbalta, TCA, Topamax, H-wave, Qutenza or NexWave E-stem device. Since last visit, she reports having been accepted as a patient with Dr [facility] and has appt scheduled on 2/3/19. .    Interventional Pain Procedures:  9/30/19 - Right L5, S1-3 lumbar RFA with Dr Mike Esquivel  9/9/19 - Left L5, S1-3 lumbar RFA with Dr Mike Esquivel  8/28/19 - bilateral L5 Lumbar MBB/bilateral S1-S3 sacral MMB with Dr Mike Esquivel  5/30/17 - Left L5, S1-S3 lumbar RFA with Dr Karlos Kendall  5/15/17 - Right L5, S1-S3 lumbar RFA with Dr Karlos Kendall  4/19/17 - bilateral L5 Lumbar MBB/bilateral S1-S3 sacral MMB with Dr Karlos Kendall    ROS:  Reports anxiety.   Denies fever, chills, nausea, vomiting, diarrhea, constipation, abdominal pain, chest pain, shortness or breath/trouble breathing, weakness, trouble swallowing, changes in vision, changes in hearing, falls, dizziness, bladder incontinence, bowel incontinence, depression, suicidal ideations, homicidal ideations or alcohol use. Opioid specific risk: GERD, MAHAMED on CPAP. Vitals:    12/06/19 1033   BP: 156/80   Pulse: 65   Resp: 18   Temp: 97.2 °F (36.2 °C)   TempSrc: Oral   SpO2: 97%   Weight: 56.7 kg (125 lb)   Height: 4' 11\" (1.499 m)   PainSc:   4   PainLoc: Back          Physical exam:  Constitutional  -  AFVSS, no acute distress, normal body habitus. A&OXs 3. Speech is clear and appropriate. HEENT -  Normocephalic, atraumatic. Conjugate gaze, clear sclerae. EOM intact. Neuro/Psych -  Mood is appropriate and patient is cooperative. Gait is within functional limits. Balance is within functional limits. Respiratory -  Non-labored breathing. Even rise of chest wall. Primary Care Physician  Nickie Lu  [place] [id number] -- .  3 most recent PHQ Screens 11/8/2019   Little interest or pleasure in doing things Not at all   Feeling down, depressed, irritable, or hopeless Not at all   Total Score PHQ 2 0         Assessment/Plan:     ICD-10-CM ICD-9-CM    1. Chronic bilateral low back pain without sciatica M54.5 724.2 oxyCODONE IR (ROXICODONE) 10 mg tab immediate release tablet    G89.29 338.29    2. Sacroiliitis (HCC) M46.1 720.2 oxyCODONE IR (ROXICODONE) 10 mg tab immediate release tablet   3. Lumbosacral spondylosis without myelopathy M47.817 721.3 oxyCODONE IR (ROXICODONE) 10 mg tab immediate release tablet   4. Spasm of back muscles M62.830 724.8 oxyCODONE IR (ROXICODONE) 10 mg tab immediate release tablet   5. Chronic pain syndrome G89.4 338.4 oxyCODONE IR (ROXICODONE) 10 mg tab immediate release tablet   6. DDD (degenerative disc disease), lumbar M51.36 722.52 oxyCODONE IR (ROXICODONE) 10 mg tab immediate release tablet   7.  Encounter for long-term (current) use of high-risk medication Z79.899 V58.69 hydrOXYzine HCl (ATARAX) 25 mg tablet      promethazine (PHENERGAN) 12.5 mg tablet      cloNIDine HCl (CATAPRES) 0.1 mg tablet        1) Medications  --I do not recommend long term opioid therapy for this patient at this time for their chronic pain; the risks outweigh the potential benefits. Pt currently taking Fentanyl 12.5mcg/hr patch changed every 72 hours and oxycodone 10mg up to 2 times a day as needed with a total of 60 tabs to be budgeted over 30 days. Their MME is 61. --Today, we will continue the weaning of patients opioid medication with a goal of being opioid free. Patient was educated on signs and symptoms of opioid withdrawal and advised to call the clinic or PCP should these symptoms arise so that we may provide support as needed. Pt was provided with oxycodone 10mg up to 3 times a day as needed with a total of 90 tabs to be budgeted over 30 days. Fentanyl 12 mcg/hr patched changed every 72 hours has been discontinued. Her new MME will be 45. --They were informed of the planned clinic closure on 12/12/19 and that this will be the last prescription of narcotic provided from this clinic. Patient was educated on signs and symptoms of opioid withdrawal and a withdrawal cocktail was provided to be used if needed. They were also advised they can go to the nearest ED for further support if needed. Pt declines referral today for continuing of care as she states she has an appt with Dr Ana Garrison on 2/3/19. They were advised to call their primary care provider to inform them of the planned clinic closure so that they may anticipate providing support for her pain while awaiting establishment at the new clinic. --Rapid opiate wean option was presented to patient and they decline. --Did not obtain ZTLido as she needs prior authorization for this medication. --Continue Gabapentin and Zanaflex as previously recommended.   --Continue to optimized Tylenol, max of 3000mg per 24 hour period from all sources. 2) Restorative Therapies  --She would benefit from H-wave therapy, aquatic PT and/or acupuncture therapy in the future. --Continue chair yoga as previously recommended. 3) Interventional Procedures  --Consider repeat RFA in the future as needed. 4) Behavorial Health Approaches  --Pt would benefit from referral to pain psychology for training and cognitive behavorial therapy, acceptance commitment therapy, biofeedback and mindfulness mediation and other modalities as indicated for treatment of chronic pain. 5) Complementary & Integrative Health  --Maintain close follow up with your PCP. --Follow up ongoing assessment and ongoing development of integrative and comprehensive plan of care for chronic pain. Withdrawal cocktail:  Phenergan 12.5mg, one PO Q8hrs PRN nausea, vomiting #12  Atarax 25mg, one PO Q8hrs PRN anxiety, pruritus, irritation #12  Clonidine 0.1mg, one PO Q12hrs PRN sweating, restlessness, hot flashes #12    Goals: To establish complementary and integrative plan of care to address chronic pain issues while minimizing pharmaceuticals to maximize patient's function improve quality of life. Education:  Patient again educated on the importance of strict compliance with the opioid care agreement while on opioid therapy. Patient also again educated that they should avoid driving while on chronic opioid therapy. Also advised to avoid alcohol and to avoid benzodiazepines while on opioid therapy. Handouts given regarding opioid safety.                Social History     Socioeconomic History    Marital status:      Spouse name: Not on file    Number of children: Not on file    Years of education: Not on file    Highest education level: Not on file   Occupational History    Not on file   Social Needs    Financial resource strain: Not on file    Food insecurity:     Worry: Not on file     Inability: Not on file   Mickie Madhav Transportation needs:     Medical: Not on file     Non-medical: Not on file   Tobacco Use    Smoking status: Never Smoker    Smokeless tobacco: Never Used   Substance and Sexual Activity    Alcohol use: Not on file    Drug use: Not on file    Sexual activity: Yes     Birth control/protection: Surgical     Comment: BTL   Lifestyle    Physical activity:     Days per week: Not on file     Minutes per session: Not on file    Stress: Not on file   Relationships    Social connections:     Talks on phone: Not on file     Gets together: Not on file     Attends Jain service: Not on file     Active member of club or organization: Not on file     Attends meetings of clubs or organizations: Not on file     Relationship status: Not on file    Intimate partner violence:     Fear of current or ex partner: Not on file     Emotionally abused: Not on file     Physically abused: Not on file     Forced sexual activity: Not on file   Other Topics Concern    Not on file   Social History Narrative    Not on file     Family History   Problem Relation Age of Onset    Hypertension Mother     Heart Disease Father     Diabetes Neg Hx      Allergies   Allergen Reactions    Codeine Nausea Only    Nsaids (Non-Steroidal Anti-Inflammatory Drug) Diarrhea     Abdominal pain    Sulfa (Sulfonamide Antibiotics) Hives     Past Medical History:   Diagnosis Date    GERD (gastroesophageal reflux disease)     Hiatal hernia     Hypertension     Nausea     Osteoarthritis     Sacroiliitis ([facility] [address]      Past Surgical History:   Procedure Laterality Date    HX APPENDECTOMY      HX CHOLECYSTECTOMY      HX GYN      uterine surgery    HX KNEE REPLACEMENT       Current Outpatient Medications on File Prior to Visit   Medication Sig    eszopiclone (LUNESTA) 3 mg tablet Take 3 mg by mouth nightly.  gabapentin (NEURONTIN) 100 mg capsule Take 1 Cap by mouth two (2) times a day for 30 days.  Max Daily Amount: 200 mg.    lidocaine (ZTLIDO) 1.8 % ptmd 1 Patch by Apply Externally route every twenty-four (24) hours for 30 days. To be worn 12 hours and then removed for 12 hours    metoprolol tartrate (LOPRESSOR) 100 mg IR tablet Take 100 mg by mouth once.  hydroCHLOROthiazide (HYDRODIURIL) 25 mg tablet Take 25 mg by mouth daily.  tiZANidine (ZANAFLEX) 4 mg tablet Take 1-2 Tabs by mouth three (3) times daily. As needed for muscle spasms (Patient taking differently: Take 4-8 mg by mouth nightly. As needed for muscle spasms)    senna (SENNA) 8.6 mg tablet Take 1 Tab by mouth daily as needed for Constipation.  lisinopril (PRINIVIL, ZESTRIL) 20 mg tablet Take 40 mg by mouth daily.  ESOMEPRAZOLE MAG TRIHYDRATE (NEXIUM PO) Take 40 mg by mouth daily.  glucosamine/chondr bragg A sod (OSTEO BI-FLEX PO) Take  by mouth.  Lactobac no.41/Bifidobact no.7 (PROBIOTIC-10 PO) Take  by mouth.  [DISCONTINUED] fentaNYL (DURAGESIC) 12 mcg/hr patch 1 Patch by TransDERmal route every seventy-two (72) hours for 30 days. Max Daily Amount: 1 Patch. M[employer] Brand only    [DISCONTINUED] oxyCODONE IR (ROXICODONE) 10 mg tab immediate release tablet Take 1 Tab by mouth two (2) times a day for 30 days. Max Daily Amount: 20 mg.    naloxone (NARCAN) 4 mg/actuation nasal spray Use 1 spray intranasally into 1 nostril as needed for opioid respiratory emergency only.  [DISCONTINUED] cloNIDine HCl (CATAPRES) 0.1 mg tablet Take 1 Tab by mouth every twelve (12) hours as needed (sweating, restlessness, hot flashes) for up to 12 doses.  naloxone 4 mg/actuation spry 4 mg by Nasal route once as needed (for opioid overdose) for up to 1 dose. Indications: OPIOID TOXICITY     No current facility-administered medications on file prior to visit. [address] was used for portions of this report. Unintended errors may occur.

## 2019-12-06 NOTE — PATIENT INSTRUCTIONS

## 2023-07-10 NOTE — TELEPHONE ENCOUNTER
Gerhardt Hagedorn has called requesting a refill of their controlled medication, fentanyl and oxycodone, for the management of back pain. Last office visit date: 8/24/18 with Manuel, next 11/16/18 with Manuel     Date last  was pulled and reviewed : 10/26/18 last filled fentanyl on 9/30/18 and oxycodone    Was the patient compliant when the above report was pulled? yes    Analgesia: 50%    Aberrancies: none    ADL's: yes, difficulty with cleaning and cooking    Adverse Reaction: none    Provider's last note and plan of care reviewed? yes  Request forwarded to provider for review. Otolaryngologist Procedure Text (B): After obtaining clear surgical margins the patient was sent to otolaryngology for surgical repair.  The patient understands they will receive post-surgical care and follow-up from the referring physician's office.

## (undated) DEVICE — COOLIEF* SINERGY* COOLED RADIOFREQUENCY PROBE KIT: Brand: AVANOS

## (undated) DEVICE — COOLED RADIOFREQUENCY KIT

## (undated) DEVICE — NEEDLE SPNL 22GA L3.5IN BLK HUB S STL REG WALL FIT STYL W/

## (undated) DEVICE — PROBE RF L75MM OD17GA 4MM PAIN MGMT SINERGY

## (undated) DEVICE — TRAY SUPP STD NO DRUG W EXTENSION SET

## (undated) DEVICE — ELECTRODE ES AD DISPER HYDRGEL THN FOAM ADH SCALLOPED EDGE

## (undated) DEVICE — NEEDLE SPNL L3.5IN DIA25GA QNCKE TYP BVL SPINOCAN

## (undated) DEVICE — (D)BNDG ADHESIVE FABRIC 3/4X3 -- DISC BY MFR USE ITEM 357960

## (undated) DEVICE — MIRAGE SWIFT II PILLOW LGE: Brand: MIRAGE SWIFT II

## (undated) DEVICE — AVANOS* SHORT BEVEL NEEDLE: Brand: AVANOS

## (undated) DEVICE — CUFF BLD PRESSURE MONITORING LNG AD 23-33 CM 1 TUBE MY CUF

## (undated) DEVICE — DRAPE,REIN 53X77,STERILE: Brand: MEDLINE

## (undated) DEVICE — DRAPE TWL SURG 16X26IN BLU ORB04] ALLCARE INC]

## (undated) DEVICE — SYR 10ML CTRL LR LCK NSAF LF --

## (undated) DEVICE — COOLED RADIOFREQUENCY PROBE